# Patient Record
Sex: FEMALE | Race: BLACK OR AFRICAN AMERICAN | NOT HISPANIC OR LATINO
[De-identification: names, ages, dates, MRNs, and addresses within clinical notes are randomized per-mention and may not be internally consistent; named-entity substitution may affect disease eponyms.]

---

## 2017-09-05 ENCOUNTER — APPOINTMENT (OUTPATIENT)
Dept: HEART AND VASCULAR | Facility: CLINIC | Age: 82
End: 2017-09-05
Payer: MEDICARE

## 2017-09-05 VITALS
SYSTOLIC BLOOD PRESSURE: 124 MMHG | TEMPERATURE: 97.8 F | DIASTOLIC BLOOD PRESSURE: 70 MMHG | HEIGHT: 62 IN | WEIGHT: 137 LBS | HEART RATE: 77 BPM | RESPIRATION RATE: 14 BRPM | BODY MASS INDEX: 25.21 KG/M2 | OXYGEN SATURATION: 96 %

## 2017-09-05 DIAGNOSIS — Z86.79 PERSONAL HISTORY OF OTHER DISEASES OF THE CIRCULATORY SYSTEM: ICD-10-CM

## 2017-09-05 DIAGNOSIS — R00.1 BRADYCARDIA, UNSPECIFIED: ICD-10-CM

## 2017-09-05 DIAGNOSIS — I34.0 NONRHEUMATIC MITRAL (VALVE) INSUFFICIENCY: ICD-10-CM

## 2017-09-05 DIAGNOSIS — I35.1 NONRHEUMATIC AORTIC (VALVE) INSUFFICIENCY: ICD-10-CM

## 2017-09-05 PROCEDURE — 36415 COLL VENOUS BLD VENIPUNCTURE: CPT

## 2017-09-05 PROCEDURE — 93306 TTE W/DOPPLER COMPLETE: CPT

## 2017-09-05 PROCEDURE — 99215 OFFICE O/P EST HI 40 MIN: CPT | Mod: 25

## 2017-09-06 PROBLEM — R00.1 BRADYCARDIA, SINUS: Status: ACTIVE | Noted: 2017-09-06

## 2017-09-06 PROBLEM — Z86.79 HISTORY OF PAROXYSMAL ATRIAL TACHYCARDIA: Status: RESOLVED | Noted: 2017-09-06 | Resolved: 2017-09-06

## 2017-09-07 LAB
ALBUMIN SERPL ELPH-MCNC: 3.5 G/DL
ALP BLD-CCNC: 49 U/L
ALT SERPL-CCNC: 13 U/L
ANION GAP SERPL CALC-SCNC: 14 MMOL/L
AST SERPL-CCNC: 23 U/L
BASOPHILS # BLD AUTO: 0.02 K/UL
BASOPHILS NFR BLD AUTO: 0.4 %
BILIRUB SERPL-MCNC: 0.2 MG/DL
BUN SERPL-MCNC: 17 MG/DL
CALCIUM SERPL-MCNC: 8.6 MG/DL
CHLORIDE SERPL-SCNC: 103 MMOL/L
CO2 SERPL-SCNC: 26 MMOL/L
CREAT SERPL-MCNC: 0.86 MG/DL
EOSINOPHIL # BLD AUTO: 0.13 K/UL
EOSINOPHIL NFR BLD AUTO: 2.4 %
GLUCOSE SERPL-MCNC: 98 MG/DL
HCT VFR BLD CALC: 34.1 %
HGB BLD-MCNC: 10.5 G/DL
IMM GRANULOCYTES NFR BLD AUTO: 0 %
LYMPHOCYTES # BLD AUTO: 1.56 K/UL
LYMPHOCYTES NFR BLD AUTO: 28.5 %
MAGNESIUM SERPL-MCNC: 2 MG/DL
MAN DIFF?: NORMAL
MCHC RBC-ENTMCNC: 29.2 PG
MCHC RBC-ENTMCNC: 30.8 GM/DL
MCV RBC AUTO: 94.7 FL
MONOCYTES # BLD AUTO: 0.44 K/UL
MONOCYTES NFR BLD AUTO: 8 %
NEUTROPHILS # BLD AUTO: 3.33 K/UL
NEUTROPHILS NFR BLD AUTO: 60.7 %
PLATELET # BLD AUTO: 220 K/UL
POTASSIUM SERPL-SCNC: 4.1 MMOL/L
PROT SERPL-MCNC: 7.2 G/DL
RBC # BLD: 3.6 M/UL
RBC # FLD: 16.7 %
SODIUM SERPL-SCNC: 143 MMOL/L
TSH SERPL-ACNC: 1 UIU/ML
WBC # FLD AUTO: 5.48 K/UL

## 2018-01-25 ENCOUNTER — APPOINTMENT (OUTPATIENT)
Dept: HEART AND VASCULAR | Facility: CLINIC | Age: 83
End: 2018-01-25
Payer: MEDICARE

## 2018-01-25 VITALS
OXYGEN SATURATION: 97 % | TEMPERATURE: 96.6 F | HEART RATE: 78 BPM | BODY MASS INDEX: 27.79 KG/M2 | SYSTOLIC BLOOD PRESSURE: 132 MMHG | RESPIRATION RATE: 12 BRPM | WEIGHT: 151 LBS | DIASTOLIC BLOOD PRESSURE: 88 MMHG | HEIGHT: 62 IN

## 2018-01-25 DIAGNOSIS — D64.9 ANEMIA, UNSPECIFIED: ICD-10-CM

## 2018-01-25 DIAGNOSIS — R94.31 ABNORMAL ELECTROCARDIOGRAM [ECG] [EKG]: ICD-10-CM

## 2018-01-25 PROCEDURE — 93000 ELECTROCARDIOGRAM COMPLETE: CPT

## 2018-01-25 PROCEDURE — 99214 OFFICE O/P EST MOD 30 MIN: CPT | Mod: 25

## 2018-01-28 LAB
ALBUMIN SERPL ELPH-MCNC: 3.9 G/DL
ALP BLD-CCNC: 48 U/L
ALT SERPL-CCNC: 23 U/L
ANION GAP SERPL CALC-SCNC: 16 MMOL/L
AST SERPL-CCNC: 28 U/L
BASOPHILS # BLD AUTO: 0.01 K/UL
BASOPHILS NFR BLD AUTO: 0.1 %
BILIRUB SERPL-MCNC: 0.3 MG/DL
BUN SERPL-MCNC: 17 MG/DL
CALCIUM SERPL-MCNC: 9 MG/DL
CHLORIDE SERPL-SCNC: 103 MMOL/L
CHOLEST SERPL-MCNC: 242 MG/DL
CHOLEST/HDLC SERPL: 2.5 RATIO
CO2 SERPL-SCNC: 26 MMOL/L
CREAT SERPL-MCNC: 0.94 MG/DL
EOSINOPHIL # BLD AUTO: 0.07 K/UL
EOSINOPHIL NFR BLD AUTO: 0.8 %
GLUCOSE SERPL-MCNC: 118 MG/DL
HCT VFR BLD CALC: 37.2 %
HDLC SERPL-MCNC: 97 MG/DL
HGB BLD-MCNC: 11.7 G/DL
IMM GRANULOCYTES NFR BLD AUTO: 0.4 %
LDLC SERPL CALC-MCNC: 123 MG/DL
LYMPHOCYTES # BLD AUTO: 0.86 K/UL
LYMPHOCYTES NFR BLD AUTO: 10.4 %
MAGNESIUM SERPL-MCNC: 2 MG/DL
MAN DIFF?: NORMAL
MCHC RBC-ENTMCNC: 30.3 PG
MCHC RBC-ENTMCNC: 31.5 GM/DL
MCV RBC AUTO: 96.4 FL
MONOCYTES # BLD AUTO: 0.27 K/UL
MONOCYTES NFR BLD AUTO: 3.3 %
NEUTROPHILS # BLD AUTO: 7.06 K/UL
NEUTROPHILS NFR BLD AUTO: 85 %
NT-PROBNP SERPL-MCNC: 335 PG/ML
PLATELET # BLD AUTO: 203 K/UL
POTASSIUM SERPL-SCNC: 4.3 MMOL/L
PROT SERPL-MCNC: 7 G/DL
RBC # BLD: 3.86 M/UL
RBC # FLD: 17.2 %
SODIUM SERPL-SCNC: 145 MMOL/L
TRIGL SERPL-MCNC: 109 MG/DL
WBC # FLD AUTO: 8.3 K/UL

## 2018-02-19 ENCOUNTER — EMERGENCY (EMERGENCY)
Facility: HOSPITAL | Age: 83
LOS: 1 days | Discharge: HOME | End: 2018-02-19
Admitting: EMERGENCY MEDICINE
Payer: MEDICARE

## 2018-02-19 VITALS
HEART RATE: 82 BPM | OXYGEN SATURATION: 97 % | RESPIRATION RATE: 16 BRPM | SYSTOLIC BLOOD PRESSURE: 173 MMHG | TEMPERATURE: 98 F | DIASTOLIC BLOOD PRESSURE: 74 MMHG

## 2018-02-19 VITALS
OXYGEN SATURATION: 97 % | TEMPERATURE: 98 F | HEART RATE: 64 BPM | DIASTOLIC BLOOD PRESSURE: 83 MMHG | SYSTOLIC BLOOD PRESSURE: 180 MMHG | RESPIRATION RATE: 18 BRPM

## 2018-02-19 DIAGNOSIS — Z90.710 ACQUIRED ABSENCE OF BOTH CERVIX AND UTERUS: ICD-10-CM

## 2018-02-19 DIAGNOSIS — I10 ESSENTIAL (PRIMARY) HYPERTENSION: ICD-10-CM

## 2018-02-19 DIAGNOSIS — Z98.890 OTHER SPECIFIED POSTPROCEDURAL STATES: Chronic | ICD-10-CM
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
## 2018-02-19 DIAGNOSIS — S30.0XXA CONTUSION OF LOWER BACK AND PELVIS, INITIAL ENCOUNTER: ICD-10-CM
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
## 2018-02-19 DIAGNOSIS — M54.9 DORSALGIA, UNSPECIFIED: ICD-10-CM
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
## 2018-02-19 DIAGNOSIS — S20.222A CONTUSION OF LEFT BACK WALL OF THORAX, INITIAL ENCOUNTER: ICD-10-CM

## 2018-02-19 DIAGNOSIS — S20.221A CONTUSION OF RIGHT BACK WALL OF THORAX, INITIAL ENCOUNTER: ICD-10-CM

## 2018-02-19 DIAGNOSIS — Z90.722 ACQUIRED ABSENCE OF OVARIES, BILATERAL: ICD-10-CM

## 2018-02-19 DIAGNOSIS — J44.9 CHRONIC OBSTRUCTIVE PULMONARY DISEASE, UNSPECIFIED: ICD-10-CM

## 2018-02-19 LAB
ALBUMIN SERPL ELPH-MCNC: 3.2 G/DL — LOW (ref 3.4–5)
ALP SERPL-CCNC: 45 U/L — SIGNIFICANT CHANGE UP (ref 40–120)
ALT FLD-CCNC: 31 U/L — SIGNIFICANT CHANGE UP (ref 12–42)
ANION GAP SERPL CALC-SCNC: 7 MMOL/L — LOW (ref 9–16)
APPEARANCE UR: CLEAR — SIGNIFICANT CHANGE UP
AST SERPL-CCNC: 24 U/L — SIGNIFICANT CHANGE UP (ref 15–37)
BILIRUB SERPL-MCNC: 0.3 MG/DL — SIGNIFICANT CHANGE UP (ref 0.2–1.2)
BILIRUB UR-MCNC: NEGATIVE — SIGNIFICANT CHANGE UP
BUN SERPL-MCNC: 19 MG/DL — SIGNIFICANT CHANGE UP (ref 7–23)
CALCIUM SERPL-MCNC: 8.3 MG/DL — LOW (ref 8.5–10.5)
CHLORIDE SERPL-SCNC: 107 MMOL/L — SIGNIFICANT CHANGE UP (ref 96–108)
CO2 SERPL-SCNC: 28 MMOL/L — SIGNIFICANT CHANGE UP (ref 22–31)
COLOR SPEC: YELLOW — SIGNIFICANT CHANGE UP
CREAT SERPL-MCNC: 0.97 MG/DL — SIGNIFICANT CHANGE UP (ref 0.5–1.3)
DIFF PNL FLD: (no result)
GLUCOSE SERPL-MCNC: 124 MG/DL — HIGH (ref 70–99)
GLUCOSE UR QL: NEGATIVE — SIGNIFICANT CHANGE UP
HCT VFR BLD CALC: 34.6 % — SIGNIFICANT CHANGE UP (ref 34.5–45)
HGB BLD-MCNC: 11.3 G/DL — LOW (ref 11.5–15.5)
INR BLD: 1.07 — SIGNIFICANT CHANGE UP (ref 0.88–1.16)
KETONES UR-MCNC: NEGATIVE — SIGNIFICANT CHANGE UP
LEUKOCYTE ESTERASE UR-ACNC: NEGATIVE — SIGNIFICANT CHANGE UP
MCHC RBC-ENTMCNC: 30.7 PG — SIGNIFICANT CHANGE UP (ref 27–34)
MCHC RBC-ENTMCNC: 32.7 G/DL — SIGNIFICANT CHANGE UP (ref 32–36)
MCV RBC AUTO: 94 FL — SIGNIFICANT CHANGE UP (ref 80–100)
NITRITE UR-MCNC: NEGATIVE — SIGNIFICANT CHANGE UP
PH UR: 6 — SIGNIFICANT CHANGE UP (ref 5–8)
PLATELET # BLD AUTO: 201 K/UL — SIGNIFICANT CHANGE UP (ref 150–400)
POTASSIUM SERPL-MCNC: 3.8 MMOL/L — SIGNIFICANT CHANGE UP (ref 3.5–5.3)
POTASSIUM SERPL-SCNC: 3.8 MMOL/L — SIGNIFICANT CHANGE UP (ref 3.5–5.3)
PROT SERPL-MCNC: 6.7 G/DL — SIGNIFICANT CHANGE UP (ref 6.4–8.2)
PROT UR-MCNC: NEGATIVE MG/DL — SIGNIFICANT CHANGE UP
PROTHROM AB SERPL-ACNC: 11.8 SEC — SIGNIFICANT CHANGE UP (ref 9.8–12.7)
RBC # BLD: 3.68 M/UL — LOW (ref 3.8–5.2)
RBC # FLD: 14.8 % — SIGNIFICANT CHANGE UP (ref 10.3–16.9)
SODIUM SERPL-SCNC: 142 MMOL/L — SIGNIFICANT CHANGE UP (ref 132–145)
SP GR SPEC: <=1.005 — SIGNIFICANT CHANGE UP (ref 1–1.03)
UROBILINOGEN FLD QL: 0.2 E.U./DL — SIGNIFICANT CHANGE UP
WBC # BLD: 8.5 K/UL — SIGNIFICANT CHANGE UP (ref 3.8–10.5)
WBC # FLD AUTO: 8.5 K/UL — SIGNIFICANT CHANGE UP (ref 3.8–10.5)

## 2018-02-19 PROCEDURE — 74177 CT ABD & PELVIS W/CONTRAST: CPT | Mod: 26

## 2018-02-19 PROCEDURE — 71260 CT THORAX DX C+: CPT | Mod: 26

## 2018-02-19 PROCEDURE — 99284 EMERGENCY DEPT VISIT MOD MDM: CPT

## 2018-02-19 RX ORDER — ACETAMINOPHEN 500 MG
2 TABLET ORAL
Qty: 30 | Refills: 0
Start: 2018-02-19 | End: 2018-02-23

## 2018-02-19 RX ORDER — IBUPROFEN 200 MG
1 TABLET ORAL
Qty: 28 | Refills: 0
Start: 2018-02-19 | End: 2018-02-25

## 2018-02-19 RX ORDER — ACETAMINOPHEN 500 MG
650 TABLET ORAL ONCE
Qty: 0 | Refills: 0 | Status: COMPLETED | OUTPATIENT
Start: 2018-02-19 | End: 2018-02-19

## 2018-02-19 RX ORDER — KETOROLAC TROMETHAMINE 30 MG/ML
30 SYRINGE (ML) INJECTION ONCE
Qty: 0 | Refills: 0 | Status: DISCONTINUED | OUTPATIENT
Start: 2018-02-19 | End: 2018-02-19

## 2018-02-19 RX ADMIN — Medication 650 MILLIGRAM(S): at 16:53

## 2018-02-19 RX ADMIN — Medication 30 MILLIGRAM(S): at 16:53

## 2018-02-19 NOTE — ED PROVIDER NOTE - CONSTITUTIONAL, MLM
normal... Well appearing, well nourished, awake, alert, oriented to person, place, time/situation and in no apparent distress. Patient laying on R side in fetal position.

## 2018-02-19 NOTE — ED PROVIDER NOTE - MEDICAL DECISION MAKING DETAILS
89 year old female presents to the ED s/p mechanical fall, no LOC, complaining of back pain. Labs unremarkable. UA (+) w/ trace blood, no leukocytosis to suggest infection. CT chest, abdomen, pelvis to rule out retroperitoneal injury vs spinal injury, no acute findings, incidental findings of diverticulosis, nephrolithiasis, and aortic valve calcification disc. Will give Toradol and Tylenol for pain, ambulatory trial, and discharge home w/ grandson.

## 2018-02-19 NOTE — ED ADULT NURSE NOTE - OBJECTIVE STATEMENT
Pt states she usually walks with a walker, but today did not have it and fell onto her back. Back does not appear swollen, no redness, no abnormalities. Arrives with grandson.

## 2018-02-19 NOTE — ED PROVIDER NOTE - PMH
Arthritis    COPD (chronic obstructive pulmonary disease)    HTN (hypertension)    Polymyalgia rheumatica

## 2018-02-19 NOTE — ED PROVIDER NOTE - MUSCULOSKELETAL, MLM
Cervical spine is non-tender. Both spinal and paraspinal tenderness to thoracic and lumbar spine. Pelvis stable. Moves all 4 extremities.

## 2018-02-19 NOTE — ED PROVIDER NOTE - OBJECTIVE STATEMENT
89 year old female with PMH HTN, COPD, arthritis, and polymyalgia rheumatica who presents to the ED s/p trip and fall at home. She states she was walking around her apartment without her cane, which she usually requires, and trip falling back. No head injury or LOC. She complains of diffuse back pain. She has had difficulty walking since the fall. She was on the floor for 5 minutes before she called her grandson who assisted her up and called EMS for transport to the ED. She is not on anticoagulants or ASA. She denies fever, chills, neck pain, chest pain, abdominal pain, pain to extremities, pain to pelvis, numbness/tingling, nausea, vomiting, visual changes.

## 2018-06-02 ENCOUNTER — EMERGENCY (EMERGENCY)
Facility: HOSPITAL | Age: 83
LOS: 1 days | Discharge: HOME | End: 2018-06-02
Attending: EMERGENCY MEDICINE | Admitting: EMERGENCY MEDICINE
Payer: MEDICARE

## 2018-06-02 VITALS
SYSTOLIC BLOOD PRESSURE: 143 MMHG | HEART RATE: 83 BPM | DIASTOLIC BLOOD PRESSURE: 85 MMHG | TEMPERATURE: 98 F | RESPIRATION RATE: 18 BRPM | OXYGEN SATURATION: 100 %

## 2018-06-02 DIAGNOSIS — Z98.890 OTHER SPECIFIED POSTPROCEDURAL STATES: Chronic | ICD-10-CM

## 2018-06-02 PROCEDURE — 99283 EMERGENCY DEPT VISIT LOW MDM: CPT

## 2018-06-02 RX ORDER — FAMOTIDINE 10 MG/ML
1 INJECTION INTRAVENOUS
Qty: 8 | Refills: 0
Start: 2018-06-02 | End: 2018-06-05

## 2018-06-02 RX ORDER — DIPHENHYDRAMINE HCL 50 MG
25 CAPSULE ORAL ONCE
Qty: 0 | Refills: 0 | Status: COMPLETED | OUTPATIENT
Start: 2018-06-02 | End: 2018-06-02

## 2018-06-02 RX ORDER — FAMOTIDINE 10 MG/ML
20 INJECTION INTRAVENOUS ONCE
Qty: 0 | Refills: 0 | Status: COMPLETED | OUTPATIENT
Start: 2018-06-02 | End: 2018-06-02

## 2018-06-02 RX ORDER — DIPHENHYDRAMINE HCL 50 MG
1 CAPSULE ORAL
Qty: 10 | Refills: 0
Start: 2018-06-02

## 2018-06-02 RX ADMIN — Medication 40 MILLIGRAM(S): at 21:30

## 2018-06-02 RX ADMIN — FAMOTIDINE 20 MILLIGRAM(S): 10 INJECTION INTRAVENOUS at 21:30

## 2018-06-02 RX ADMIN — Medication 25 MILLIGRAM(S): at 21:30

## 2018-06-02 NOTE — ED ADULT TRIAGE NOTE - SPO2 (%)
Yes, I believe this prescription needs to be sent to Myron Gupta at [facility] neurology for refill.    Please advise patient or pharmacy to send refill request to the appropriate office.    Lazaro Gupta MD    100

## 2018-06-02 NOTE — ED PROVIDER NOTE - MEDICAL DECISION MAKING DETAILS
Angioedema.  Likely due to diovan.  Instructed to stop diovan.  Will give steroids and H1/2 blockers for possible allergic reaction.  Instructed to f/u with her PCP on Monday for new anti-hypertensive and re-evaluation.  instructed to return here immediately for any other sxs or worsening swelling.

## 2018-06-02 NOTE — ED PROVIDER NOTE - PHYSICAL EXAMINATION
VITAL SIGNS: I have reviewed nursing notes and confirm.  CONSTITUTIONAL: Well-developed; well-nourished; in no acute distress.  SKIN: Skin is warm and dry, no acute rash.  HEAD: Normocephalic; atraumatic.  EYES: PERRL, EOM intact; conjunctiva and sclera clear.  ENT: No nasal discharge; airway clear; no tongue or uvular edema.  +Swelling to upper lip.  Normal voice.   NECK: Supple; non tender.  CARD: S1, S2 normal; no murmurs, gallops, or rubs. Regular rate and rhythm.  RESP: No wheezes, rales or rhonchi.  ABD: Normal bowel sounds; soft; non-distended; non-tender; no hepatosplenomegaly.  EXT: Normal ROM. No clubbing, cyanosis or edema.  NEURO: Alert, oriented. Grossly unremarkable.  PSYCH: Cooperative, appropriate.

## 2018-06-02 NOTE — ED PROVIDER NOTE - OBJECTIVE STATEMENT
Pt is a 89yo Pt is a 89yo F with a h/o HTN, PMR, COPD, and previous angioedema from lisinopril who p/w 1 day of upper lip swelling.  swelling came on slowly and no associated throat or tongue swelling.  Denies any wheezing, drooling, change in voice, rash, itching, abd pain, N/V/D, or other concerns.  Reports angioedema about 5 years ago and was exactly the same.  Was switched from lisinopril to diovan at that time.

## 2018-06-02 NOTE — ED ADULT TRIAGE NOTE - CHIEF COMPLAINT QUOTE
pt c/o lip swelling since this afternoon, no airway compromise, happened once before with lisinopril, no ace inhibitors for last few years

## 2018-06-06 DIAGNOSIS — T78.3XXA ANGIONEUROTIC EDEMA, INITIAL ENCOUNTER: ICD-10-CM

## 2018-06-06 DIAGNOSIS — I10 ESSENTIAL (PRIMARY) HYPERTENSION: ICD-10-CM

## 2018-06-06 DIAGNOSIS — R22.0 LOCALIZED SWELLING, MASS AND LUMP, HEAD: ICD-10-CM

## 2018-06-06 DIAGNOSIS — Z88.2 ALLERGY STATUS TO SULFONAMIDES: ICD-10-CM

## 2018-06-06 DIAGNOSIS — J44.9 CHRONIC OBSTRUCTIVE PULMONARY DISEASE, UNSPECIFIED: ICD-10-CM

## 2018-06-06 DIAGNOSIS — Z79.1 LONG TERM (CURRENT) USE OF NON-STEROIDAL ANTI-INFLAMMATORIES (NSAID): ICD-10-CM

## 2018-07-19 ENCOUNTER — APPOINTMENT (OUTPATIENT)
Dept: HEART AND VASCULAR | Facility: CLINIC | Age: 83
End: 2018-07-19
Payer: MEDICARE

## 2018-07-19 VITALS
BODY MASS INDEX: 26.31 KG/M2 | DIASTOLIC BLOOD PRESSURE: 70 MMHG | RESPIRATION RATE: 12 BRPM | WEIGHT: 143 LBS | HEART RATE: 81 BPM | OXYGEN SATURATION: 97 % | TEMPERATURE: 97.7 F | SYSTOLIC BLOOD PRESSURE: 110 MMHG | HEIGHT: 62 IN

## 2018-07-19 PROBLEM — J44.9 CHRONIC OBSTRUCTIVE PULMONARY DISEASE, UNSPECIFIED: Chronic | Status: ACTIVE | Noted: 2018-02-19

## 2018-07-19 PROBLEM — M19.90 UNSPECIFIED OSTEOARTHRITIS, UNSPECIFIED SITE: Chronic | Status: ACTIVE | Noted: 2018-02-19

## 2018-07-19 PROBLEM — M35.3 POLYMYALGIA RHEUMATICA: Chronic | Status: ACTIVE | Noted: 2018-02-19

## 2018-07-19 PROBLEM — I10 ESSENTIAL (PRIMARY) HYPERTENSION: Chronic | Status: ACTIVE | Noted: 2018-02-19

## 2018-07-19 PROCEDURE — 99214 OFFICE O/P EST MOD 30 MIN: CPT

## 2018-09-10 ENCOUNTER — APPOINTMENT (OUTPATIENT)
Dept: HEART AND VASCULAR | Facility: CLINIC | Age: 83
End: 2018-09-10
Payer: MEDICARE

## 2018-09-10 VITALS
HEIGHT: 62 IN | BODY MASS INDEX: 25.76 KG/M2 | RESPIRATION RATE: 12 BRPM | DIASTOLIC BLOOD PRESSURE: 78 MMHG | TEMPERATURE: 97.5 F | WEIGHT: 140 LBS | HEART RATE: 80 BPM | SYSTOLIC BLOOD PRESSURE: 140 MMHG | OXYGEN SATURATION: 97 %

## 2018-09-10 PROCEDURE — 93000 ELECTROCARDIOGRAM COMPLETE: CPT

## 2018-09-10 PROCEDURE — 93306 TTE W/DOPPLER COMPLETE: CPT

## 2019-01-15 NOTE — ED ADULT NURSE NOTE - PT NEEDS ASSIST
8w3d  Caller: patient  pregnancy   Details of condition: Patient experiencing spotting.  Pharmacy verified? No  Appointment offered? No  Patient would like a call back at [phone]   yes

## 2019-01-23 ENCOUNTER — RX RENEWAL (OUTPATIENT)
Age: 84
End: 2019-01-23

## 2019-03-14 ENCOUNTER — APPOINTMENT (OUTPATIENT)
Dept: HEART AND VASCULAR | Facility: CLINIC | Age: 84
End: 2019-03-14
Payer: MEDICARE

## 2019-03-14 VITALS
HEIGHT: 62 IN | BODY MASS INDEX: 24.29 KG/M2 | TEMPERATURE: 97.2 F | SYSTOLIC BLOOD PRESSURE: 130 MMHG | RESPIRATION RATE: 12 BRPM | WEIGHT: 132 LBS | DIASTOLIC BLOOD PRESSURE: 80 MMHG | OXYGEN SATURATION: 96 % | HEART RATE: 84 BPM

## 2019-03-14 DIAGNOSIS — R53.83 OTHER FATIGUE: ICD-10-CM

## 2019-03-14 DIAGNOSIS — I11.9 HYPERTENSIVE HEART DISEASE W/OUT HEART FAILURE: ICD-10-CM

## 2019-03-14 PROCEDURE — 99214 OFFICE O/P EST MOD 30 MIN: CPT

## 2019-03-14 PROCEDURE — 93000 ELECTROCARDIOGRAM COMPLETE: CPT

## 2019-03-14 NOTE — DISCUSSION/SUMMARY
[A-V Block] : A-V block [Mild Aortic Stenosis] : mild aortic stenosis [Stable] : stable [Responding to Treatment] : responding to treatment [Outpatient Evaluation] : outpatient evaluation [Echocardiogram] : echocardiogram [None] : none [___ Month(s)] : [unfilled] month(s) [With Me] : with me [FreeTextEntry1] : no change in therapy warranted at this time\par \par follow up echocardiogram  9/19 to assess aortic stenosis \par \par medications reconciled and renewed \par \par answered all questions

## 2019-03-14 NOTE — REASON FOR VISIT
[Follow-Up - Clinic] : a clinic follow-up of [Dyspnea] : dyspnea [Fatigue] : feeling tired (fatigue) [Hypertension] : hypertension [Medication Management] : Medication management [FreeTextEntry1] : 89  year old -American female presents for follow up. Has remote hx of syncope  that occurred on August 3rd 2017  while on a [employer] .  EKG  showed sinus bradycardia  with 1st degree AVB with nonspecific T wave abnormalities.  She was administered iv hydration and BP was 140/80   with HR 68 bpm ,  o 2 sats 100%   on low flow 0xygen.  Since that episode , there were no recurrences. She denies chest pains, palpitations, orthopnea, bleeding from any orifices , fevers, chills , diarrhea, nausea or vomiting. \par \par She has mild aortic stenosis  with normal LV function

## 2019-03-14 NOTE — REVIEW OF SYSTEMS
[Feeling Fatigued] : feeling fatigued [Dyspnea on exertion] : dyspnea during exertion [Lower Ext Edema] : lower extremity edema [Joint Pain] : joint pain [Joint Swelling] : joint swelling [Negative] : Heme/Lymph [Recent Weight Gain (___ Lbs)] : no recent weight gain [Recent Weight Loss (___ Lbs)] : no recent weight loss [Palpitations] : no palpitations

## 2019-03-14 NOTE — HISTORY OF PRESENT ILLNESS
[FreeTextEntry1] : Main complaint is fatigue and increasing dyspnea, \par \par PMD decreased prednisone from 5mg daily to 5mg alternating with 2.5mg \par \par No chest pain \par \par EKG stable sinus rhythm  1 degree AVB with LVH and nonspecific T wave flattening \par \par Appetite fair , no fevers, cough , no change in thyroid  medication dosing \par \par

## 2019-03-14 NOTE — PHYSICAL EXAM
[Well Groomed] : well groomed [General Appearance - Well Nourished] : well nourished [General Appearance - In No Acute Distress] : no acute distress [Normal Conjunctiva] : the conjunctiva exhibited no abnormalities [Eyelids - No Xanthelasma] : the eyelids demonstrated no xanthelasmas [No Oral Cyanosis] : no oral cyanosis [Normal Jugular Venous A Waves Present] : normal jugular venous A waves present [Normal Jugular Venous V Waves Present] : normal jugular venous V waves present [No Jugular Venous Jo A Waves] : no jugular venous jo A waves [Respiration, Rhythm And Depth] : normal respiratory rhythm and effort [Exaggerated Use Of Accessory Muscles For Inspiration] : no accessory muscle use [Auscultation Breath Sounds / Voice Sounds] : lungs were clear to auscultation bilaterally [Heart Rate And Rhythm] : heart rate and rhythm were normal [Heart Sounds] : normal S1 and S2 [Systolic grade ___/6] : A grade [unfilled]/6 systolic murmur was heard. [Nail Clubbing] : no clubbing of the fingernails [Cyanosis, Localized] : no localized cyanosis [Petechial Hemorrhages (___cm)] : no petechial hemorrhages [Nail Splinter Hemorrhages] : no splinter hemorrhages of the nails [Fingers Osler's Nodes] : Osler's nodes were not seenon the fingers [Skin Color & Pigmentation] : normal skin color and pigmentation [Skin Turgor] : normal skin turgor [] : no rash [No Venous Stasis] : no venous stasis [Skin Lesions] : no skin lesions [No Skin Ulcers] : no skin ulcer [No Xanthoma] : no  xanthoma was observed [Oriented To Time, Place, And Person] : oriented to person, place, and time [Impaired Insight] : insight and judgment were intact [Affect] : the affect was normal [Mood] : the mood was normal [Memory Recent] : recent memory was not impaired [Memory Remote] : remote memory was not impaired [No Anxiety] : not feeling anxious [FreeTextEntry1] : walks with walker

## 2019-03-14 NOTE — ASSESSMENT
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

## 2019-09-12 ENCOUNTER — APPOINTMENT (OUTPATIENT)
Dept: HEART AND VASCULAR | Facility: CLINIC | Age: 84
End: 2019-09-12
Payer: MEDICARE

## 2019-09-12 VITALS
TEMPERATURE: 98.8 F | OXYGEN SATURATION: 98 % | HEIGHT: 59 IN | DIASTOLIC BLOOD PRESSURE: 78 MMHG | SYSTOLIC BLOOD PRESSURE: 130 MMHG | RESPIRATION RATE: 12 BRPM | WEIGHT: 134 LBS | HEART RATE: 81 BPM | BODY MASS INDEX: 27.01 KG/M2

## 2019-09-12 DIAGNOSIS — R55 SYNCOPE AND COLLAPSE: ICD-10-CM

## 2019-09-12 PROCEDURE — 93224 XTRNL ECG REC UP TO 48 HRS: CPT

## 2019-09-12 PROCEDURE — 36415 COLL VENOUS BLD VENIPUNCTURE: CPT

## 2019-09-12 PROCEDURE — 99214 OFFICE O/P EST MOD 30 MIN: CPT

## 2019-09-12 PROCEDURE — 93000 ELECTROCARDIOGRAM COMPLETE: CPT | Mod: 58

## 2019-09-15 LAB
ALBUMIN SERPL ELPH-MCNC: 3.9 G/DL
ALP BLD-CCNC: 65 U/L
ALT SERPL-CCNC: 16 U/L
ANION GAP SERPL CALC-SCNC: 13 MMOL/L
AST SERPL-CCNC: 21 U/L
BASOPHILS # BLD AUTO: 0.02 K/UL
BASOPHILS NFR BLD AUTO: 0.4 %
BILIRUB SERPL-MCNC: 0.3 MG/DL
BUN SERPL-MCNC: 17 MG/DL
CALCIUM SERPL-MCNC: 8.9 MG/DL
CHLORIDE SERPL-SCNC: 103 MMOL/L
CHOLEST SERPL-MCNC: 152 MG/DL
CHOLEST/HDLC SERPL: 2.5 RATIO
CO2 SERPL-SCNC: 26 MMOL/L
CREAT SERPL-MCNC: 0.92 MG/DL
EOSINOPHIL # BLD AUTO: 0.15 K/UL
EOSINOPHIL NFR BLD AUTO: 3 %
ESTIMATED AVERAGE GLUCOSE: 117 MG/DL
GLUCOSE SERPL-MCNC: 106 MG/DL
HBA1C MFR BLD HPLC: 5.7 %
HCT VFR BLD CALC: 35.8 %
HDLC SERPL-MCNC: 61 MG/DL
HGB BLD-MCNC: 10.9 G/DL
IMM GRANULOCYTES NFR BLD AUTO: 0.4 %
LDLC SERPL CALC-MCNC: 76 MG/DL
LYMPHOCYTES # BLD AUTO: 1.13 K/UL
LYMPHOCYTES NFR BLD AUTO: 22.6 %
MAN DIFF?: NORMAL
MCHC RBC-ENTMCNC: 29.6 PG
MCHC RBC-ENTMCNC: 30.4 GM/DL
MCV RBC AUTO: 97.3 FL
MONOCYTES # BLD AUTO: 0.43 K/UL
MONOCYTES NFR BLD AUTO: 8.6 %
NEUTROPHILS # BLD AUTO: 3.26 K/UL
NEUTROPHILS NFR BLD AUTO: 65 %
NT-PROBNP SERPL-MCNC: 254 PG/ML
PLATELET # BLD AUTO: 222 K/UL
POTASSIUM SERPL-SCNC: 4 MMOL/L
PROT SERPL-MCNC: 6.5 G/DL
RBC # BLD: 3.68 M/UL
RBC # FLD: 15.3 %
SODIUM SERPL-SCNC: 142 MMOL/L
TRIGL SERPL-MCNC: 74 MG/DL
TSH SERPL-ACNC: 1.02 UIU/ML
WBC # FLD AUTO: 5.01 K/UL

## 2019-09-15 NOTE — REASON FOR VISIT
[Follow-Up - Clinic] : a clinic follow-up of [Abnormal ECG] : an abnormal ECG [Aortic Stenosis] : aortic stenosis [Hypertension] : hypertension [Syncope] : syncope [FreeTextEntry1] : 89  year old -American female presents for evaluation after recent syncope. \par \jazmín  Has remote hx of syncope  that occurred on August 3rd 2017  while on a [employer] .  EKG  showed sinus bradycardia  with 1st degree AVB with nonspecific T wave abnormalities.  She was administered iv hydration and BP was 140/80   with HR 68 bpm ,  o 2 sats 100%   on low flow 0xygen.  Since that episode , there were no recurrences. She denies chest pains, palpitations, orthopnea, bleeding from any orifices , fevers, chills , diarrhea, nausea or vomiting. \par \par She has mild aortic stenosis  with normal LV function

## 2019-09-15 NOTE — ASSESSMENT
[FreeTextEntry1] : recurrent syncope, probably vasovagal \par \par mild aortic stenosis \par \par HTN well controlled\par \par 1st degree AVB [1st Degree AV Block (426.11\I44.0)] : ~T physical exam was performed

## 2019-09-15 NOTE — HISTORY OF PRESENT ILLNESS
[FreeTextEntry1] : Most recent episode of syncope was a repeat scenario  that occurred during visit to  . After washing, she was placed under hair drier and she syncopized without head trauma , nausea, vomiting or seizure activity. \par \par EKG now shows  NSR 73 bpm with 1st degree AVB without ectopy, ischemia or LVH \par \par She denies bleeding from any orifices\par \par Has been compliant with all medications \par \par Denies chest pains, palpitations, orthopnea, headaches

## 2019-09-15 NOTE — REVIEW OF SYSTEMS
[Recent Weight Gain (___ Lbs)] : no recent weight gain [Feeling Fatigued] : not feeling fatigued [Recent Weight Loss (___ Lbs)] : no recent weight loss [Dyspnea on exertion] : dyspnea during exertion [Lower Ext Edema] : lower extremity edema [Palpitations] : no palpitations [Joint Pain] : joint pain [Joint Swelling] : joint swelling [Negative] : Heme/Lymph

## 2019-09-15 NOTE — PHYSICAL EXAM
[General Appearance - Well Developed] : well developed [Normal Appearance] : normal appearance [Well Groomed] : well groomed [General Appearance - Well Nourished] : well nourished [No Deformities] : no deformities [General Appearance - In No Acute Distress] : no acute distress [Normal Conjunctiva] : the conjunctiva exhibited no abnormalities [Eyelids - No Xanthelasma] : the eyelids demonstrated no xanthelasmas [No Oral Cyanosis] : no oral cyanosis [Normal Jugular Venous A Waves Present] : normal jugular venous A waves present [Normal Jugular Venous V Waves Present] : normal jugular venous V waves present [No Jugular Venous Jo A Waves] : no jugular venous jo A waves [Respiration, Rhythm And Depth] : normal respiratory rhythm and effort [Exaggerated Use Of Accessory Muscles For Inspiration] : no accessory muscle use [Auscultation Breath Sounds / Voice Sounds] : lungs were clear to auscultation bilaterally [Heart Rate And Rhythm] : heart rate and rhythm were normal [Heart Sounds] : normal S1 and S2 [Systolic grade ___/6] : A grade [unfilled]/6 systolic murmur was heard. [Nail Clubbing] : no clubbing of the fingernails [FreeTextEntry1] : walks with walker [Cyanosis, Localized] : no localized cyanosis [Petechial Hemorrhages (___cm)] : no petechial hemorrhages [Nail Splinter Hemorrhages] : no splinter hemorrhages of the nails [Fingers Osler's Nodes] : Osler's nodes were not seenon the fingers [Skin Turgor] : normal skin turgor [Skin Color & Pigmentation] : normal skin color and pigmentation [] : no rash [No Venous Stasis] : no venous stasis [Skin Lesions] : no skin lesions [No Skin Ulcers] : no skin ulcer [No Xanthoma] : no  xanthoma was observed [Oriented To Time, Place, And Person] : oriented to person, place, and time [Impaired Insight] : insight and judgment were intact [Affect] : the affect was normal [Mood] : the mood was normal [Memory Remote] : remote memory was not impaired [Memory Recent] : recent memory was not impaired [No Anxiety] : not feeling anxious

## 2019-09-15 NOTE — DISCUSSION/SUMMARY
[A-V Block] : A-V block [Mild Aortic Stenosis] : mild aortic stenosis [Essential Hypertension] : essential hypertension [Stable] : stable [Responding to Treatment] : responding to treatment [None] : none [With PCP] : with ~his/her~ primary care provider [FreeTextEntry1] : venipuncture performed \par \par Holter monitor placed \par \par advised patient to avoid having hair dried under a drier in the salon - intense heat may cause significant vasodilation \par \par No evidence of significant stenosis of the carotid or vertebral arterial systems

## 2019-11-18 ENCOUNTER — EMERGENCY (EMERGENCY)
Facility: HOSPITAL | Age: 84
LOS: 1 days | Discharge: HOME | End: 2019-11-18
Attending: EMERGENCY MEDICINE | Admitting: EMERGENCY MEDICINE
Payer: MEDICARE

## 2019-11-18 VITALS
SYSTOLIC BLOOD PRESSURE: 180 MMHG | RESPIRATION RATE: 16 BRPM | OXYGEN SATURATION: 99 % | HEART RATE: 76 BPM | DIASTOLIC BLOOD PRESSURE: 100 MMHG

## 2019-11-18 VITALS
OXYGEN SATURATION: 97 % | DIASTOLIC BLOOD PRESSURE: 99 MMHG | WEIGHT: 154.32 LBS | RESPIRATION RATE: 14 BRPM | TEMPERATURE: 98 F | SYSTOLIC BLOOD PRESSURE: 189 MMHG | HEART RATE: 87 BPM

## 2019-11-18 DIAGNOSIS — Z98.890 OTHER SPECIFIED POSTPROCEDURAL STATES: Chronic | ICD-10-CM

## 2019-11-18 PROCEDURE — 70450 CT HEAD/BRAIN W/O DYE: CPT | Mod: 26

## 2019-11-18 PROCEDURE — 72125 CT NECK SPINE W/O DYE: CPT | Mod: 26

## 2019-11-18 PROCEDURE — 99284 EMERGENCY DEPT VISIT MOD MDM: CPT

## 2019-11-18 RX ORDER — ACETAMINOPHEN 500 MG
975 TABLET ORAL ONCE
Refills: 0 | Status: COMPLETED | OUTPATIENT
Start: 2019-11-18 | End: 2019-11-18

## 2019-11-18 RX ORDER — BACITRACIN ZINC 500 UNIT/G
1 OINTMENT IN PACKET (EA) TOPICAL ONCE
Refills: 0 | Status: COMPLETED | OUTPATIENT
Start: 2019-11-18 | End: 2019-11-18

## 2019-11-18 RX ADMIN — Medication 975 MILLIGRAM(S): at 08:48

## 2019-11-18 RX ADMIN — Medication 1 APPLICATION(S): at 07:17

## 2019-11-18 NOTE — ED ADULT NURSE REASSESSMENT NOTE - NS ED NURSE REASSESS COMMENT FT1
bacitracin applied to bridge of nose, haematoma now noticed on left side of forehead
pt en route to cat scan
Patient awake, alert, oriented x3 semi-fowlers in stretcher watching TV in no acute distress (no SOB,  non-diaphoretic), breathing/conversing w/ease on RA. Stephen @bedside. Has skin tear to top of nose & small dime-sized hematoma to R forehead, not open. Both covered in baci. CT complete - awaiting results. Will continue to monitor.

## 2019-11-18 NOTE — ED ADULT NURSE NOTE - OBJECTIVE STATEMENT
pt biba and son, mechanical fall tonight fell out of bed and sustained lac across bridge of nose, denies loc, denies taking blood thinners not actively bleeding from nose injury pt biba and son, mechanical fall tonight whilst trying to get  out of bed and sustained lac across bridge of nose, denies loc, denies taking blood thinners not actively bleeding from nose injury, walks with cane at home, lives with grandson, states has a fall 'one or twice a year", not regularly, wears bilateral wrist splints for arthritis

## 2019-11-18 NOTE — ED PROVIDER NOTE - PATIENT PORTAL LINK FT
You can access the FollowMyHealth Patient Portal offered by [facility] by registering at the following website: [email or web address]. By joining [employer]’s FollowMyHealth portal, you will also be able to view your health information using other applications (apps) compatible with our system.

## 2019-11-18 NOTE — ED PROVIDER NOTE - CARE PLAN
Principal Discharge DX:	Acute head injury, initial encounter  Secondary Diagnosis:	Facial abrasion, initial encounter

## 2019-11-18 NOTE — ED PROVIDER NOTE - CLINICAL SUMMARY MEDICAL DECISION MAKING FREE TEXT BOX
abrasion to bridge of nose when fell from bed, no LOC, no blood thinners, + headache, CT head and bacitracin ordered

## 2019-11-18 NOTE — ED PROVIDER NOTE - NSFOLLOWUPINSTRUCTIONS_ED_ALL_ED_FT
Apply bacitracin every 12 hours to the wound for a week.  Please follow up with your doctor and return here at any time if you have any concerns.

## 2019-11-22 DIAGNOSIS — S00.31XA ABRASION OF NOSE, INITIAL ENCOUNTER: ICD-10-CM
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
## 2019-11-22 DIAGNOSIS — S09.90XA UNSPECIFIED INJURY OF HEAD, INITIAL ENCOUNTER: ICD-10-CM

## 2020-04-16 ENCOUNTER — APPOINTMENT (OUTPATIENT)
Dept: HEART AND VASCULAR | Facility: CLINIC | Age: 85
End: 2020-04-16
Payer: MEDICARE

## 2020-04-16 DIAGNOSIS — Z87.09 PERSONAL HISTORY OF OTHER DISEASES OF THE RESPIRATORY SYSTEM: ICD-10-CM

## 2020-04-16 PROCEDURE — 99441: CPT

## 2020-04-27 RX ORDER — AMLODIPINE BESYLATE 5 MG/1
5 TABLET ORAL DAILY
Qty: 1 | Refills: 1 | Status: ACTIVE | COMMUNITY
Start: 2018-10-02 | End: 1900-01-01

## 2020-04-27 RX ORDER — EZETIMIBE 10 MG/1
10 TABLET ORAL
Qty: 90 | Refills: 1 | Status: ACTIVE | COMMUNITY
Start: 2017-09-08 | End: 1900-01-01

## 2020-04-27 RX ORDER — ALLOPURINOL 100 MG/1
100 TABLET ORAL DAILY
Qty: 90 | Refills: 1 | Status: ACTIVE | COMMUNITY
Start: 2020-04-27 | End: 1900-01-01

## 2020-08-04 ENCOUNTER — APPOINTMENT (OUTPATIENT)
Dept: HEART AND VASCULAR | Facility: CLINIC | Age: 85
End: 2020-08-04
Payer: MEDICARE

## 2020-08-04 VITALS
TEMPERATURE: 98.6 F | OXYGEN SATURATION: 96 % | RESPIRATION RATE: 12 BRPM | HEIGHT: 59 IN | WEIGHT: 131 LBS | DIASTOLIC BLOOD PRESSURE: 76 MMHG | SYSTOLIC BLOOD PRESSURE: 120 MMHG | BODY MASS INDEX: 26.41 KG/M2 | HEART RATE: 83 BPM

## 2020-08-04 DIAGNOSIS — R94.31 ABNORMAL ELECTROCARDIOGRAM [ECG] [EKG]: ICD-10-CM

## 2020-08-04 PROCEDURE — 99214 OFFICE O/P EST MOD 30 MIN: CPT

## 2020-08-04 PROCEDURE — 36415 COLL VENOUS BLD VENIPUNCTURE: CPT

## 2020-08-04 PROCEDURE — 93306 TTE W/DOPPLER COMPLETE: CPT

## 2020-08-04 PROCEDURE — 93000 ELECTROCARDIOGRAM COMPLETE: CPT

## 2020-08-04 NOTE — ASSESSMENT
[FreeTextEntry1] : Increasing Dyspnea on exertion \par \par 1st degree AVB\par \par Increasing loudness of crescendo aortic murmur  of stenosis\par \par \par diastolic  CHF [1st Degree AV Block (426.11\I44.0)] : ~T physical exam was performed [Diastolic Congestive Heart Failure (428.30\I50.30)] : lateral

## 2020-08-04 NOTE — PHYSICAL EXAM
[Well Groomed] : well groomed [Normal Appearance] : normal appearance [General Appearance - Well Developed] : well developed [General Appearance - Well Nourished] : well nourished [No Deformities] : no deformities [General Appearance - In No Acute Distress] : no acute distress [Normal Conjunctiva] : the conjunctiva exhibited no abnormalities [No Oral Cyanosis] : no oral cyanosis [Eyelids - No Xanthelasma] : the eyelids demonstrated no xanthelasmas [Normal Jugular Venous A Waves Present] : normal jugular venous A waves present [No Jugular Venous Jo A Waves] : no jugular venous jo A waves [Normal Jugular Venous V Waves Present] : normal jugular venous V waves present [Respiration, Rhythm And Depth] : normal respiratory rhythm and effort [Auscultation Breath Sounds / Voice Sounds] : lungs were clear to auscultation bilaterally [Exaggerated Use Of Accessory Muscles For Inspiration] : no accessory muscle use [Heart Sounds] : normal S1 and S2 [Heart Rate And Rhythm] : heart rate and rhythm were normal [Systolic grade ___/6] : A grade [unfilled]/6 systolic murmur was heard. [FreeTextEntry1] : walks with walker [Nail Clubbing] : no clubbing of the fingernails [Cyanosis, Localized] : no localized cyanosis [Petechial Hemorrhages (___cm)] : no petechial hemorrhages [Nail Splinter Hemorrhages] : no splinter hemorrhages of the nails [Fingers Osler's Nodes] : Osler's nodes were not seenon the fingers [Skin Color & Pigmentation] : normal skin color and pigmentation [Skin Turgor] : normal skin turgor [No Venous Stasis] : no venous stasis [] : no rash [Skin Lesions] : no skin lesions [No Skin Ulcers] : no skin ulcer [No Xanthoma] : no  xanthoma was observed [Oriented To Time, Place, And Person] : oriented to person, place, and time [Impaired Insight] : insight and judgment were intact [Affect] : the affect was normal [Memory Recent] : recent memory was not impaired [Memory Remote] : remote memory was not impaired [Mood] : the mood was normal [No Anxiety] : not feeling anxious

## 2020-08-04 NOTE — HISTORY OF PRESENT ILLNESS
[FreeTextEntry1] : Today, she is dizzy  and EKG shows NSR 1st degree AVB HR 80 bpm with LVH by voltage  without pathologic Q waves. \par \par \par No fevers, cough, chills or known Covid exposure \par \par

## 2020-08-04 NOTE — REASON FOR VISIT
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  EKG  showed sinus bradycardia  with 1st degree AVB with nonspecific T wave abnormalities.  She was administered iv hydration and BP was 140/80   with HR 68 bpm ,  o 2 sats 100%   on low flow 0xygen.  Since that episode , there were no recurrences. She denies chest pains, palpitations, orthopnea, bleeding from any orifices , fevers, chills , diarrhea, nausea or vomiting. \par \par She has mild aortic stenosis identified by echocardiogram back in 9/2017 with normal LV function

## 2020-08-04 NOTE — DISCUSSION/SUMMARY
[Moderate Aortic Stenosis] : moderate aortic stenosis [Deteriorating] : deteriorating [Outpatient Evaluation] : outpatient evaluation [BNP] : B-type natriuretic peptide [Echocardiogram] : echocardiogram [FreeTextEntry1] : venipuncture with  BNP level , Covid 19 antibody test, etc \par \par \par echocardiogram to assess status of aortic stenosis severity  and its potential role in current symptoms

## 2020-08-04 NOTE — REVIEW OF SYSTEMS
[Feeling Fatigued] : not feeling fatigued [Recent Weight Gain (___ Lbs)] : no recent weight gain [Recent Weight Loss (___ Lbs)] : no recent weight loss [Dyspnea on exertion] : dyspnea during exertion [Lower Ext Edema] : lower extremity edema [Joint Pain] : joint pain [Palpitations] : no palpitations [Joint Swelling] : joint swelling [Dizziness] : dizziness [Negative] : Heme/Lymph

## 2020-08-05 LAB
ALBUMIN SERPL ELPH-MCNC: 4.2 G/DL
ALP BLD-CCNC: 66 U/L
ALT SERPL-CCNC: 23 U/L
ANION GAP SERPL CALC-SCNC: 13 MMOL/L
APPEARANCE: CLEAR
AST SERPL-CCNC: 28 U/L
BASOPHILS # BLD AUTO: 0.03 K/UL
BASOPHILS NFR BLD AUTO: 0.5 %
BILIRUB SERPL-MCNC: 0.4 MG/DL
BILIRUBIN URINE: NEGATIVE
BLOOD URINE: NEGATIVE
BUN SERPL-MCNC: 14 MG/DL
CALCIUM SERPL-MCNC: 8.6 MG/DL
CHLORIDE SERPL-SCNC: 102 MMOL/L
CHOLEST SERPL-MCNC: 179 MG/DL
CHOLEST/HDLC SERPL: 2.4 RATIO
CO2 SERPL-SCNC: 26 MMOL/L
COLOR: COLORLESS
CREAT SERPL-MCNC: 0.95 MG/DL
CREAT SPEC-SCNC: 19 MG/DL
EOSINOPHIL # BLD AUTO: 0.05 K/UL
EOSINOPHIL NFR BLD AUTO: 0.9 %
GLUCOSE QUALITATIVE U: NEGATIVE
GLUCOSE SERPL-MCNC: 98 MG/DL
HCT VFR BLD CALC: 37.8 %
HDLC SERPL-MCNC: 76 MG/DL
HGB BLD-MCNC: 11.2 G/DL
IMM GRANULOCYTES NFR BLD AUTO: 0.2 %
KETONES URINE: NEGATIVE
LDLC SERPL CALC-MCNC: 90 MG/DL
LEUKOCYTE ESTERASE URINE: NEGATIVE
LYMPHOCYTES # BLD AUTO: 1.84 K/UL
LYMPHOCYTES NFR BLD AUTO: 32.3 %
MAN DIFF?: NORMAL
MCHC RBC-ENTMCNC: 29.6 GM/DL
MCHC RBC-ENTMCNC: 29.7 PG
MCV RBC AUTO: 100.3 FL
MICROALBUMIN 24H UR DL<=1MG/L-MCNC: <1.2 MG/DL
MICROALBUMIN/CREAT 24H UR-RTO: NORMAL MG/G
MONOCYTES # BLD AUTO: 0.39 K/UL
MONOCYTES NFR BLD AUTO: 6.9 %
NEUTROPHILS # BLD AUTO: 3.37 K/UL
NEUTROPHILS NFR BLD AUTO: 59.2 %
NITRITE URINE: NEGATIVE
NT-PROBNP SERPL-MCNC: 153 PG/ML
PH URINE: 6
PLATELET # BLD AUTO: 222 K/UL
POTASSIUM SERPL-SCNC: 3.7 MMOL/L
PROT SERPL-MCNC: 6.7 G/DL
PROTEIN URINE: NEGATIVE
RBC # BLD: 3.77 M/UL
RBC # FLD: 16.8 %
SARS-COV-2 IGG SERPL IA-ACNC: 0.01 INDEX
SARS-COV-2 IGG SERPL QL IA: NEGATIVE
SODIUM SERPL-SCNC: 141 MMOL/L
SPECIFIC GRAVITY URINE: 1
TRIGL SERPL-MCNC: 64 MG/DL
TSH SERPL-ACNC: 11.6 UIU/ML
UROBILINOGEN URINE: NORMAL
WBC # FLD AUTO: 5.69 K/UL

## 2020-08-12 ENCOUNTER — EMERGENCY (EMERGENCY)
Facility: HOSPITAL | Age: 85
LOS: 1 days | Discharge: HOME | End: 2020-08-12
Attending: EMERGENCY MEDICINE | Admitting: EMERGENCY MEDICINE
Payer: COMMERCIAL

## 2020-08-12 VITALS
TEMPERATURE: 98 F | HEIGHT: 61 IN | SYSTOLIC BLOOD PRESSURE: 135 MMHG | RESPIRATION RATE: 16 BRPM | DIASTOLIC BLOOD PRESSURE: 81 MMHG | WEIGHT: 132.06 LBS | HEART RATE: 86 BPM | OXYGEN SATURATION: 97 %

## 2020-08-12 VITALS
HEART RATE: 78 BPM | SYSTOLIC BLOOD PRESSURE: 155 MMHG | TEMPERATURE: 98 F | DIASTOLIC BLOOD PRESSURE: 83 MMHG | OXYGEN SATURATION: 98 % | RESPIRATION RATE: 16 BRPM

## 2020-08-12 DIAGNOSIS — Z98.890 OTHER SPECIFIED POSTPROCEDURAL STATES: Chronic | ICD-10-CM

## 2020-08-12 LAB
ALBUMIN SERPL ELPH-MCNC: 4.2 G/DL — SIGNIFICANT CHANGE UP (ref 3.3–5)
ALP SERPL-CCNC: 70 U/L — SIGNIFICANT CHANGE UP (ref 40–120)
ALT FLD-CCNC: 20 U/L — SIGNIFICANT CHANGE UP (ref 10–45)
ANION GAP SERPL CALC-SCNC: 12 MMOL/L — SIGNIFICANT CHANGE UP (ref 5–17)
APPEARANCE UR: CLEAR — SIGNIFICANT CHANGE UP
APTT BLD: 27.9 SEC — SIGNIFICANT CHANGE UP (ref 27.5–35.5)
AST SERPL-CCNC: 27 U/L — SIGNIFICANT CHANGE UP (ref 10–40)
BASOPHILS # BLD AUTO: 0.02 K/UL — SIGNIFICANT CHANGE UP (ref 0–0.2)
BASOPHILS NFR BLD AUTO: 0.3 % — SIGNIFICANT CHANGE UP (ref 0–2)
BILIRUB SERPL-MCNC: 0.6 MG/DL — SIGNIFICANT CHANGE UP (ref 0.2–1.2)
BILIRUB UR-MCNC: NEGATIVE — SIGNIFICANT CHANGE UP
BLD GP AB SCN SERPL QL: NEGATIVE — SIGNIFICANT CHANGE UP
BUN SERPL-MCNC: 17 MG/DL — SIGNIFICANT CHANGE UP (ref 7–23)
CALCIUM SERPL-MCNC: 9.1 MG/DL — SIGNIFICANT CHANGE UP (ref 8.4–10.5)
CHLORIDE SERPL-SCNC: 102 MMOL/L — SIGNIFICANT CHANGE UP (ref 96–108)
CO2 SERPL-SCNC: 28 MMOL/L — SIGNIFICANT CHANGE UP (ref 22–31)
COLOR SPEC: YELLOW — SIGNIFICANT CHANGE UP
CREAT SERPL-MCNC: 0.86 MG/DL — SIGNIFICANT CHANGE UP (ref 0.5–1.3)
DIFF PNL FLD: ABNORMAL
EOSINOPHIL # BLD AUTO: 0.04 K/UL — SIGNIFICANT CHANGE UP (ref 0–0.5)
EOSINOPHIL NFR BLD AUTO: 0.6 % — SIGNIFICANT CHANGE UP (ref 0–6)
GLUCOSE SERPL-MCNC: 105 MG/DL — HIGH (ref 70–99)
GLUCOSE UR QL: NEGATIVE — SIGNIFICANT CHANGE UP
HCT VFR BLD CALC: 34.8 % — SIGNIFICANT CHANGE UP (ref 34.5–45)
HGB BLD-MCNC: 11.1 G/DL — LOW (ref 11.5–15.5)
IMM GRANULOCYTES NFR BLD AUTO: 0.3 % — SIGNIFICANT CHANGE UP (ref 0–1.5)
INR BLD: 1.01 — SIGNIFICANT CHANGE UP (ref 0.88–1.16)
KETONES UR-MCNC: NEGATIVE — SIGNIFICANT CHANGE UP
LEUKOCYTE ESTERASE UR-ACNC: ABNORMAL
LYMPHOCYTES # BLD AUTO: 1 K/UL — SIGNIFICANT CHANGE UP (ref 1–3.3)
LYMPHOCYTES # BLD AUTO: 14.6 % — SIGNIFICANT CHANGE UP (ref 13–44)
MCHC RBC-ENTMCNC: 30.7 PG — SIGNIFICANT CHANGE UP (ref 27–34)
MCHC RBC-ENTMCNC: 31.9 GM/DL — LOW (ref 32–36)
MCV RBC AUTO: 96.1 FL — SIGNIFICANT CHANGE UP (ref 80–100)
MONOCYTES # BLD AUTO: 0.44 K/UL — SIGNIFICANT CHANGE UP (ref 0–0.9)
MONOCYTES NFR BLD AUTO: 6.4 % — SIGNIFICANT CHANGE UP (ref 2–14)
NEUTROPHILS # BLD AUTO: 5.34 K/UL — SIGNIFICANT CHANGE UP (ref 1.8–7.4)
NEUTROPHILS NFR BLD AUTO: 77.8 % — HIGH (ref 43–77)
NITRITE UR-MCNC: NEGATIVE — SIGNIFICANT CHANGE UP
NRBC # BLD: 0 /100 WBCS — SIGNIFICANT CHANGE UP (ref 0–0)
PH UR: 6.5 — SIGNIFICANT CHANGE UP (ref 5–8)
PLATELET # BLD AUTO: 195 K/UL — SIGNIFICANT CHANGE UP (ref 150–400)
POTASSIUM SERPL-MCNC: 4 MMOL/L — SIGNIFICANT CHANGE UP (ref 3.5–5.3)
POTASSIUM SERPL-SCNC: 4 MMOL/L — SIGNIFICANT CHANGE UP (ref 3.5–5.3)
PROT SERPL-MCNC: 7.2 G/DL — SIGNIFICANT CHANGE UP (ref 6–8.3)
PROT UR-MCNC: NEGATIVE MG/DL — SIGNIFICANT CHANGE UP
PROTHROM AB SERPL-ACNC: 12.1 SEC — SIGNIFICANT CHANGE UP (ref 10.6–13.6)
RBC # BLD: 3.62 M/UL — LOW (ref 3.8–5.2)
RBC # FLD: 16.2 % — HIGH (ref 10.3–14.5)
RH IG SCN BLD-IMP: POSITIVE — SIGNIFICANT CHANGE UP
SODIUM SERPL-SCNC: 142 MMOL/L — SIGNIFICANT CHANGE UP (ref 135–145)
SP GR SPEC: 1.01 — SIGNIFICANT CHANGE UP (ref 1–1.03)
UROBILINOGEN FLD QL: 0.2 E.U./DL — SIGNIFICANT CHANGE UP
WBC # BLD: 6.86 K/UL — SIGNIFICANT CHANGE UP (ref 3.8–10.5)
WBC # FLD AUTO: 6.86 K/UL — SIGNIFICANT CHANGE UP (ref 3.8–10.5)

## 2020-08-12 PROCEDURE — 85610 PROTHROMBIN TIME: CPT

## 2020-08-12 PROCEDURE — 86901 BLOOD TYPING SEROLOGIC RH(D): CPT

## 2020-08-12 PROCEDURE — 99285 EMERGENCY DEPT VISIT HI MDM: CPT

## 2020-08-12 PROCEDURE — 36415 COLL VENOUS BLD VENIPUNCTURE: CPT

## 2020-08-12 PROCEDURE — 86850 RBC ANTIBODY SCREEN: CPT

## 2020-08-12 PROCEDURE — 81001 URINALYSIS AUTO W/SCOPE: CPT

## 2020-08-12 PROCEDURE — 96360 HYDRATION IV INFUSION INIT: CPT | Mod: XU

## 2020-08-12 PROCEDURE — 85730 THROMBOPLASTIN TIME PARTIAL: CPT

## 2020-08-12 PROCEDURE — 80053 COMPREHEN METABOLIC PANEL: CPT

## 2020-08-12 PROCEDURE — 87086 URINE CULTURE/COLONY COUNT: CPT

## 2020-08-12 PROCEDURE — 74177 CT ABD & PELVIS W/CONTRAST: CPT

## 2020-08-12 PROCEDURE — 85025 COMPLETE CBC W/AUTO DIFF WBC: CPT

## 2020-08-12 PROCEDURE — 99284 EMERGENCY DEPT VISIT MOD MDM: CPT | Mod: 25

## 2020-08-12 PROCEDURE — 74177 CT ABD & PELVIS W/CONTRAST: CPT | Mod: 26

## 2020-08-12 RX ORDER — IOHEXOL 300 MG/ML
30 INJECTION, SOLUTION INTRAVENOUS ONCE
Refills: 0 | Status: COMPLETED | OUTPATIENT
Start: 2020-08-12 | End: 2020-08-12

## 2020-08-12 RX ORDER — SODIUM CHLORIDE 9 MG/ML
500 INJECTION INTRAMUSCULAR; INTRAVENOUS; SUBCUTANEOUS ONCE
Refills: 0 | Status: COMPLETED | OUTPATIENT
Start: 2020-08-12 | End: 2020-08-12

## 2020-08-12 RX ADMIN — IOHEXOL 30 MILLILITER(S): 300 INJECTION, SOLUTION INTRAVENOUS at 16:37

## 2020-08-12 RX ADMIN — SODIUM CHLORIDE 500 MILLILITER(S): 9 INJECTION INTRAMUSCULAR; INTRAVENOUS; SUBCUTANEOUS at 16:37

## 2020-08-12 RX ADMIN — SODIUM CHLORIDE 500 MILLILITER(S): 9 INJECTION INTRAMUSCULAR; INTRAVENOUS; SUBCUTANEOUS at 17:27

## 2020-08-12 NOTE — ED ADULT TRIAGE NOTE - CHIEF COMPLAINT QUOTE
Pt CO Rectal Bleeding today.  Pt states "The last time this happened it was years ago and they said it was Diverticulitis."  Pt denies N/V/D, SOB, Fevers, CP and Dizziness.  Masked PTA.

## 2020-08-12 NOTE — ED PROVIDER NOTE - PATIENT PORTAL LINK FT
You can access the FollowMyHealth Patient Portal offered by [facility] by registering at the following website: [email or web address]. By joining [employer]’s FollowMyHealth portal, you will also be able to view your health information using other applications (apps) compatible with our system.

## 2020-08-12 NOTE — ED ADULT NURSE NOTE - NSIMPLEMENTINTERV_GEN_ALL_ED
Implemented All Fall with Harm Risk Interventions:  [place] to call system. Call bell, personal items and telephone within reach. Instruct patient to call for assistance. Room bathroom lighting operational. Non-slip footwear when patient is off stretcher. Physically safe environment: no spills, clutter or unnecessary equipment. Stretcher in lowest position, wheels locked, appropriate side rails in place. Provide visual cue, wrist band, yellow gown, etc. Monitor gait and stability. Monitor for mental status changes and reorient to person, place, and time. Review medications for side effects contributing to fall risk. Reinforce activity limits and safety measures with patient and family. Provide visual clues: red socks.

## 2020-08-12 NOTE — ED PROVIDER NOTE - PROGRESS NOTE DETAILS
CT results reviewed, no back pain, no weakness in legs, ambulating w walker, to fu w pmd, strict return prec given.

## 2020-08-12 NOTE — ED PROVIDER NOTE - OBJECTIVE STATEMENT
89 y/o F pt with PMHx of arthritis, polymyalgia rheumatica, COPD, and HTN, and PSHx of total hysterectomy and bilateral oophorectomy presents to ED c/o lower abdominal pain x 2 days, and dark brown stool dark red streaks of blood today. Pt states her symptoms are similar to her prior hx of diverticulitis. Pt denies vomiting, passing flatus, cough, chest pain, shortness of breath, fevers, chills, or any other acute complaints.

## 2020-08-12 NOTE — ED PROVIDER NOTE - CLINICAL SUMMARY MEDICAL DECISION MAKING FREE TEXT BOX
89 y/o F pt presents to ED with lower abdominal pain that is similar to prior hx of diverticulitis. Pt is well appearing and has minimal tenderness to lower abdomen, but given age will obtain labs, CT, hydrate, and reassess.

## 2020-08-12 NOTE — ED ADULT NURSE REASSESSMENT NOTE - NS ED NURSE REASSESS COMMENT FT1
PT discussed finding with DR. López. PT cleared for dispo and out pt follow up. Grandson at bedside.

## 2020-08-12 NOTE — ED PROVIDER NOTE - TEMPLATE, MLM
CC: right shoulder pain     65 y.o. Male who reports that the pain is severe and not responding to any conservative care.  He was referred to us by Dr. Mobley.     He reports burning pains intermittently and of varying locations to his right lateral elbow, right shoulder and right hand that has been present for 3.5 weeks with no injury or trauma. He also reports intermittent paresthesias of these same areas that sometimes wake him from sleep. His pain and paresthesias area independent of activity. He received a lateral epicondyle steroid injection by Dr. Mobley 2 weeks ago with no pain relief at all.      It is difficult for him to locate or isolate where his pain is coming from today.    He has chronic pain of other areas and takes vicodin for pain control.    Is affecting ADLs.     Review of Systems   Constitution: Negative. Negative for chills, fever and night sweats.   HENT: Negative for congestion and headaches.    Eyes: Negative for blurred vision, left vision loss and right vision loss.   Cardiovascular: Negative for chest pain and syncope.   Respiratory: Negative for cough and shortness of breath.    Endocrine: Negative for polydipsia, polyphagia and polyuria.   Hematologic/Lymphatic: Negative for bleeding problem. Does not bruise/bleed easily.   Skin: Negative for dry skin, itching and rash.   Musculoskeletal: Negative for falls and muscle weakness.   Gastrointestinal: Negative for abdominal pain and bowel incontinence.   Genitourinary: Negative for bladder incontinence and nocturia.   Neurological: Negative for disturbances in coordination, loss of balance and seizures.   Psychiatric/Behavioral: Negative for depression. The patient does not have insomnia.    Allergic/Immunologic: Negative for hives and persistent infections.     PAST MEDICAL HISTORY:   Past Medical History:   Diagnosis Date    Diabetes mellitus     Osteoarthritis      PAST SURGICAL HISTORY: History reviewed. No pertinent  surgical history.  FAMILY HISTORY: History reviewed. No pertinent family history.  SOCIAL HISTORY:   Social History     Social History    Marital status:      Spouse name: N/A    Number of children: N/A    Years of education: N/A     Occupational History    Not on file.     Social History Main Topics    Smoking status: Never Smoker    Smokeless tobacco: Not on file    Alcohol use Yes      Comment: socially/     Drug use: Unknown    Sexual activity: Not on file     Other Topics Concern    Not on file     Social History Narrative    No narrative on file       MEDICATIONS:   Current Outpatient Prescriptions:     amlodipine (NORVASC) 5 MG tablet, TAKE ONE TABLET BY MOUTH EVERY EVENING, Disp: 34 tablet, Rfl: 11    aspirin (ECOTRIN) 81 MG EC tablet, Take 81 mg by mouth once daily., Disp: , Rfl:     atorvastatin (LIPITOR) 40 MG tablet, Take 40 mg by mouth every evening., Disp: , Rfl: 5    bacitracin-neomycin-polymyxin b-hydrocortisone 1 % ointment, Apply topically 2 (two) times daily., Disp: 15 g, Rfl: 1    blood sugar diagnostic Strp, 1 strip by Misc.(Non-Drug; Combo Route) route once daily., Disp: 180 strip, Rfl: 3    blood-glucose meter Misc, Use as directed, Disp: 1 each, Rfl: 0    butabarbital (BUTISOL) 30 mg Tab, Take 30 mg by mouth continuous prn., Disp: , Rfl:     diphenoxylate-atropine 2.5-0.025 mg (LOMOTIL) 2.5-0.025 mg per tablet, Dose after each loose bm up to max 3 per day, Disp: 25 tablet, Rfl: 0    fenofibrate (TRICOR) 145 MG tablet, TAKE ONE TABLET BY MOUTH EVERY EVENING, Disp: 34 tablet, Rfl: 11    glimepiride (AMARYL) 4 MG tablet, Take 1 tablet by mouth once daily at 6am., Disp: , Rfl: 5    lancets Misc, 1 Device by Misc.(Non-Drug; Combo Route) route once daily., Disp: 180 each, Rfl: 3    lisinopril (PRINIVIL,ZESTRIL) 40 MG tablet, TAKE ONE TABLET BY MOUTH EVERY DAY, Disp: 34 tablet, Rfl: 11    losartan (COZAAR) 100 MG tablet, Take 1 tablet (100 mg total) by mouth once  "daily., Disp: 90 tablet, Rfl: 3    meloxicam (MOBIC) 15 MG tablet, Take 1 tablet (15 mg total) by mouth once daily., Disp: 30 tablet, Rfl: 1    multivitamin (MULTIVITAMIN) per tablet, Take 1 tablet by mouth once daily., Disp: , Rfl:     omeprazole (PRILOSEC) 20 MG capsule, TAKE ONE CAPSULE BY MOUTH EVERY DAY, Disp: 34 capsule, Rfl: 11    pen needle, diabetic (RELION PEN NEEDLES) 32 gauge x 5/32" Ndle, Needs levemir flexpen needles. To use once daily, Disp: 100 each, Rfl: 3    pravastatin (PRAVACHOL) 80 MG tablet, TAKE ONE TABLET BY MOUTH EVERY EVENING, Disp: 34 tablet, Rfl: 11    SITagliptan-metformin (JANUMET)  mg per tablet, Take 1 tablet by mouth 2 (two) times daily with meals., Disp: , Rfl:     triamcinolone acetonide 0.1% (KENALOG) 0.1 % cream, Apply 1 application topically continuous prn., Disp: 454 g, Rfl: 11    UBIDECARENONE (CO Q-10 ORAL), Take 1 capsule by mouth once daily., Disp: , Rfl:   ALLERGIES:   Review of patient's allergies indicates:   Allergen Reactions    Codeine Nausea Only       VITAL SIGNS: BP (!) 160/90   Pulse 75   Ht 5' 7" (1.702 m)   Wt 105.2 kg (232 lb)   BMI 36.34 kg/m²      PHYSICAL EXAMINATION:  General:  The patient is alert and oriented x 3.  Mood is pleasant.  Observation of ears, eyes and nose reveal no gross abnormalities.  No labored breathing observed.  Gait is coordinated. Patient can toe walk and heel walk without difficulty.      right Shoulder / Upper Extremity Exam    OBSERVATION:     Swelling  none  Deformity  none   Discoloration  none   Scapular winging none   Scars   none  Atrophy  none    TENDERNESS / CREPITUS (T/C):          T/C      T/C   Clavicle   -/-  SUPRAspinatus    -/-     AC Jt.    -/-  INFRAspinatus  -/-    SC Jt.    -/-  Deltoid    -/-      G. Tuberosity  -/-  LH BICEP groove  -/-   Acromion:  -/-  Midline Neck   -/-     Scapular Spine -/-  Trapezium   -/-   SMA Scapula  -/-  GH jt. line - post  -/-     Scapulothoracic "  -/-         ROM: (* = with pain)  Right shoulder   Left shoulder        AROM (PROM)   AROM (PROM)   FE    170° (175°)     170° (175°)     ER at 0°    60°  (65°)    60°  (65°)   ER at 90° ABD  90°  (90°)    90°  (90°)   IR at 90°  ABD   NA  (40°)     NA  (40°)      IR (spine level)   T12     T10    STRENGTH: (* = with pain) RIGHT SHOULDER  LEFT SHOULDER   SCAPTION   5/5    5/5    IR    5/5    5/5   ER    5/5    5/5   BICEPS   5/5    5/5   Deltoid    5/5    5/5     SIGNS:  Painful side       NEER   -    OPAZS  neg    CORTES   -   SPEEDS  neg     DROP ARM   neg   BELLY PRESS neg   Superior escape none    LIFT-OFF  neg   X-Body ADD    neg    MOVING VALGUS neg        STABILITY TESTING    RIGHT SHOULDER   LEFT SHOULDER     Translation     Anterior  up face     up face    Posterior  up face    up face    Sulcus   < 10mm    < 10 mm     Signs   Apprehension   neg      neg       Relocation   no change     no change      Jerk test  neg     neg    EXTREMITY NEURO-VASCULAR EXAM    Sensation grossly intact to light touch all dermatomal regions.    DTR 2+ Biceps, Triceps, BR and Negative Ledas sign   Grossly intact motor function at Elbow, Wrist and Hand   Distal pulses radial and ulnar 2+, brisk cap refill, symmetric.      NECK:  Painless FROM and spinous processes non-tender. Negative Spurlings sign.      OTHER FINDINGS:    Right Elbow Exam:   Musculoskeletal   Left UE    Upper extremities shows full active and passive range of motion bilaterally of the shoulders and wrist.                            Elbow show full active and passive range of motion, full flexion and extension bilaterally.     Palpation shows no masses    Shoulder stability is normal    Strength to flexion and extension well maintained;    Skin shows no rashes, lesions or cafe au lait spots    Negative tinels, neg phalen's compression test, no thenar atrophy     Right UE    ROM shows normal flexion, extension of elbow and wrist    Palpation  shows no masses    Shoulder stability is normal.     Strength to flexion and extension well maintained    Skin shows no rashes, lesions, abrasions, or cafe au lait spots                          Negative tinels, neg phalen's compression test, no thenar atrophy      Patient has NO pain with palpation over lateral epicondyle and NO pain with resisted wrist extension on the left    ROM:  Elbow (AROM)  Right    Left            Elbow Flexion   165°                  165°      Elbow Extension   0°      0°      Pronation   90     90   Supination   90    90    Elbow STRENGTH:                       RIGHT    LEFT    Elbow Flexion   5/5    5/5    Elbow Extension  5/5    5/5   Hand    5/5    5/5   Supination    5/5    5/5   Pronation              5/5    5/5      Hook test - Negative  Cozen sign - negative  Mills test    - negative  Painful supination - negative    Xrays of the right elbow from PCP were reviewed today:  Xrays of the right elbow were reviewed by me today. No fracture, subluxation, or other significant bony or joint abnormality is identified. Mild DJD noted of the elbow joint.    XRAYS:  Shoulder 3 view series right,  were obtained and reviewed  No convincing fracture or dislocation is noted. The osseous structures appear well mineralized and well aligned. Mild DJD noted of the right shoulder.        ASSESSMENT:   right:  1. Shoulder pain, acute   2.   Elbow pain, acute  3.   Paresthesias and pain of the RUE    Hard for me to find an exact etiology of patient's pain today. Sound like radicular nerve pain to me.     PLAN:    1. Continue OTC pain control +/- current narcotic meds    2. EMG to evaluate for right cervical or upper extremity nerve pathology or impingement.    3. Patient [place]. Will call him with results and to schedule f/u after EMG is completed.       All questions were answered, pt will contact us for questions or concerns in the interim.     Abdominal Pain, N/V/D

## 2020-08-12 NOTE — ED ADULT NURSE NOTE - OBJECTIVE STATEMENT
89y F 89y F A&OX3, presents to ed ambulatory with walker for rectal bleeding today. Reports has previously happened to her years ago and noted it was diverticulitis. Pt denies cp, sob, cough, fevers, chills, lightheadedness, dizziness. Denies n/v/d. No urinary s/s. Minimal tenderness on palpation to abdomen.

## 2020-08-12 NOTE — ED PROVIDER NOTE - NSFOLLOWUPINSTRUCTIONS_ED_ALL_ED_FT
Can take tylenol 650mg every 6hrs as needed for pain.  Can take pepcid 20mg once or twice a day.   Follow up with your primary doctor within 1-2 days.   Return for persistent fever/vomit, uncontrolled pain, difficulty breathing, worsening lightheaded.  Follow up with gastroenterologist for persistent symptoms.     SEEK IMMEDIATE MEDICAL CARE IF YOU HAVE ANY OF THE FOLLOWING SYMPTOMS: worsening abdominal pain, uncontrollable vomiting, profuse diarrhea, inability to have bowel movements or pass gas, black or bloody stools, fever accompanying chest pain or back pain, or fainting. These symptoms may represent a serious problem that is an emergency. Do not wait to see if the symptoms will go away. Get medical help right away. Call 911 and do not drive yourself to the hospital.    Abdominal Pain, Adult    Abdominal pain can be caused by many things. Often, abdominal pain is not serious and it gets better with no treatment or by being treated at home. However, sometimes abdominal pain is serious. Your health care provider will do a medical history and a physical exam to try to determine the cause of your abdominal pain.    Follow these instructions at home:  Take over-the-counter and prescription medicines only as told by your health care provider. Do not take a laxative unless told by your health care provider.  Drink enough fluid to keep your urine clear or pale yellow.  Watch your condition for any changes.  Keep all follow-up visits as told by your health care provider. This is important.    Contact a health care provider if:  Your abdominal pain changes or gets worse.  You are not hungry or you lose weight without trying.  You are constipated or have diarrhea for more than 2–3 days.  You have pain when you urinate or have a bowel movement.  Your abdominal pain wakes you up at night.  Your pain gets worse with meals, after eating, or with certain foods.  You are throwing up and cannot keep anything down.  You have a fever.    Get help right away if:  Your pain does not go away as soon as your health care provider told you to expect.  You cannot stop throwing up.  Your pain is only in areas of the abdomen, such as the right side or the left lower portion of the abdomen.  You have bloody or black stools, or stools that look like tar.  You have severe pain, cramping, or bloating in your abdomen.  You have signs of dehydration, such as:  Dark urine, very little urine, or no urine.  Cracked lips.  Dry mouth.  Sunken eyes.  Sleepiness.  Weakness.    Constipation    Constipation is when a person has fewer than three bowel movements a week, has difficulty having a bowel movement, or has stools that are dry, hard, or larger than normal. Other symptoms can include abdominal pain or bloating. As people grow older, constipation is more common. A low-fiber diet, not taking in enough fluids, and taking certain medicines, including opioid painkillers, may make constipation worse. Treatment varies but may include dietary modifications (more fiber-rich foods), lifestyle modifications, and possible medications.     SEEK IMMEDIATE MEDICAL CARE IF YOU HAVE ANY OF THE FOLLOWING SYMPTOMS: bright red blood in your stool, constipation for longer than 4 days, abdominal or rectal pain, unexplained weight loss, or inability to pass gas.

## 2020-08-12 NOTE — ED ADULT NURSE NOTE - CHPI ED NUR SYMPTOMS NEG
no abdominal distension/no chills/no dysuria/no fever/no hematuria/no nausea/no diarrhea/no vomiting/no burning urination

## 2020-08-14 LAB
CULTURE RESULTS: SIGNIFICANT CHANGE UP
SPECIMEN SOURCE: SIGNIFICANT CHANGE UP

## 2020-08-16 DIAGNOSIS — I10 ESSENTIAL (PRIMARY) HYPERTENSION: ICD-10-CM

## 2020-08-16 DIAGNOSIS — Z79.899 OTHER LONG TERM (CURRENT) DRUG THERAPY: ICD-10-CM

## 2020-08-16 DIAGNOSIS — R10.30 LOWER ABDOMINAL PAIN, UNSPECIFIED: ICD-10-CM

## 2020-08-16 DIAGNOSIS — Z79.1 LONG TERM (CURRENT) USE OF NON-STEROIDAL ANTI-INFLAMMATORIES (NSAID): ICD-10-CM

## 2020-08-16 DIAGNOSIS — K92.1 MELENA: ICD-10-CM

## 2020-08-16 DIAGNOSIS — Z88.8 ALLERGY STATUS TO OTHER DRUGS, MEDICAMENTS AND BIOLOGICAL SUBSTANCES: ICD-10-CM

## 2020-08-16 DIAGNOSIS — J44.9 CHRONIC OBSTRUCTIVE PULMONARY DISEASE, UNSPECIFIED: ICD-10-CM

## 2020-08-16 DIAGNOSIS — Z88.2 ALLERGY STATUS TO SULFONAMIDES: ICD-10-CM

## 2020-08-17 ENCOUNTER — APPOINTMENT (OUTPATIENT)
Dept: CARDIOTHORACIC SURGERY | Facility: CLINIC | Age: 85
End: 2020-08-17
Payer: MEDICARE

## 2020-08-17 VITALS
HEART RATE: 84 BPM | BODY MASS INDEX: 26 KG/M2 | SYSTOLIC BLOOD PRESSURE: 131 MMHG | DIASTOLIC BLOOD PRESSURE: 70 MMHG | TEMPERATURE: 97.5 F | OXYGEN SATURATION: 96 % | WEIGHT: 129 LBS | RESPIRATION RATE: 18 BRPM | HEIGHT: 59 IN

## 2020-08-17 PROCEDURE — 99203 OFFICE O/P NEW LOW 30 MIN: CPT

## 2020-08-18 RX ORDER — PREDNISONE 50 MG/1
TABLET ORAL DAILY
Refills: 0 | Status: ACTIVE | COMMUNITY

## 2020-08-18 RX ORDER — FUROSEMIDE 20 MG/1
20 TABLET ORAL DAILY
Refills: 0 | Status: ACTIVE | COMMUNITY

## 2020-08-18 NOTE — REVIEW OF SYSTEMS
[see HPI] : see HPI [Feeling Tired] : feeling tired [Palpitations] : palpitations [Lower Ext Edema] : lower extremity edema [As Noted in HPI] : as noted in HPI [Shortness Of Breath] : shortness of breath [Negative] : Psychiatric

## 2020-08-20 NOTE — HISTORY OF PRESENT ILLNESS
[FreeTextEntry1] : 89 year old female with a history of hypertension, hyperlipidemia, Bladder Ca, diverticulosis, GI bleed, DM (on oral medications), hypothyroidism, Polymyalgia Rheumatica, osteoporosis, pAtrial tachycardia, chronic diastolic heart failure with aortic stenosis who was referred for further evaluation of her valvular disease. \par \par The patient states she is feeling extremely fatigued.  She complains of shortness of breath after a half of a block, palpitations, and LE edema.  The patient complains of dizziness at times with a history of syncope episodes in the past.  She states that her syncope was related to dehydration.  She denies chest pain, shortness of breath at rest, orthopnea, or PND.  \par \par The patient underwent an ECHO on 08/4/2020 which showed moderate to severe aortic stenosis.\par \par

## 2020-08-20 NOTE — PHYSICAL EXAM
[General Appearance - Well Developed] : well developed [General Appearance - Well Nourished] : well nourished [Normal Appearance] : normal appearance [Well Groomed] : well groomed [Normal Station and Gait] : the gait and station were normal for the patient's age [No Focal Deficits] : no focal deficits [Inguinal Lymph Nodes Enlarged Bilaterally] : inguinal [General Appearance - Alert] : alert [General Appearance - In No Acute Distress] : in no acute distress [Neck Appearance] : the appearance of the neck was normal [] : no respiratory distress [Exaggerated Use Of Accessory Muscles For Inspiration] : no accessory muscle use [Respiration, Rhythm And Depth] : normal respiratory rhythm and effort [Examination Of The Chest] : the chest was normal in appearance [Heart Rate And Rhythm] : heart rate was normal and rhythm regular [Auscultation Breath Sounds / Voice Sounds] : lungs were clear to auscultation bilaterally [Abdomen Soft] : soft [Abdomen Tenderness] : non-tender [Skin Color & Pigmentation] : normal skin color and pigmentation [Oriented To Time, Place, And Person] : oriented to person, place, and time [FreeTextEntry1] : pt walks with a walker

## 2020-09-02 ENCOUNTER — APPOINTMENT (OUTPATIENT)
Dept: UROLOGY | Facility: CLINIC | Age: 85
End: 2020-09-02
Payer: MEDICARE

## 2020-09-02 VITALS
OXYGEN SATURATION: 99 % | BODY MASS INDEX: 26 KG/M2 | TEMPERATURE: 98 F | WEIGHT: 129 LBS | SYSTOLIC BLOOD PRESSURE: 136 MMHG | HEIGHT: 59 IN | RESPIRATION RATE: 16 BRPM | DIASTOLIC BLOOD PRESSURE: 73 MMHG | HEART RATE: 98 BPM

## 2020-09-02 DIAGNOSIS — Z87.442 PERSONAL HISTORY OF URINARY CALCULI: ICD-10-CM

## 2020-09-02 DIAGNOSIS — Z87.440 PERSONAL HISTORY OF URINARY (TRACT) INFECTIONS: ICD-10-CM

## 2020-09-02 PROCEDURE — 99204 OFFICE O/P NEW MOD 45 MIN: CPT

## 2020-09-03 LAB
APPEARANCE: ABNORMAL
BACTERIA: ABNORMAL
BILIRUBIN URINE: NEGATIVE
BLOOD URINE: ABNORMAL
COLOR: NORMAL
GLUCOSE QUALITATIVE U: NEGATIVE
HYALINE CASTS: 0 /LPF
KETONES URINE: NEGATIVE
LEUKOCYTE ESTERASE URINE: ABNORMAL
MICROSCOPIC-UA: NORMAL
NITRITE URINE: POSITIVE
PH URINE: 7
PROTEIN URINE: NEGATIVE
RED BLOOD CELLS URINE: 24 /HPF
SPECIFIC GRAVITY URINE: 1.01
SQUAMOUS EPITHELIAL CELLS: 0 /HPF
UROBILINOGEN URINE: NORMAL
WHITE BLOOD CELLS URINE: 401 /HPF

## 2020-09-07 LAB — BACTERIA UR CULT: ABNORMAL

## 2020-09-07 RX ORDER — AMOXICILLIN AND CLAVULANATE POTASSIUM 500; 125 MG/1; MG/1
500-125 TABLET, FILM COATED ORAL
Qty: 10 | Refills: 0 | Status: ACTIVE | COMMUNITY
Start: 2020-09-07 | End: 1900-01-01

## 2020-09-08 ENCOUNTER — APPOINTMENT (OUTPATIENT)
Dept: CARDIOTHORACIC SURGERY | Facility: HOSPITAL | Age: 85
End: 2020-09-08

## 2020-09-10 ENCOUNTER — OUTPATIENT (OUTPATIENT)
Dept: OUTPATIENT SERVICES | Facility: HOSPITAL | Age: 85
LOS: 1 days | End: 2020-09-10

## 2020-09-10 ENCOUNTER — APPOINTMENT (OUTPATIENT)
Dept: CT IMAGING | Facility: CLINIC | Age: 85
End: 2020-09-10
Payer: MEDICARE

## 2020-09-10 ENCOUNTER — RESULT REVIEW (OUTPATIENT)
Age: 85
End: 2020-09-10

## 2020-09-10 DIAGNOSIS — Z98.890 OTHER SPECIFIED POSTPROCEDURAL STATES: Chronic | ICD-10-CM

## 2020-09-10 PROCEDURE — 74178 CT ABD&PLV WO CNTR FLWD CNTR: CPT | Mod: 26

## 2020-09-11 NOTE — HISTORY OF PRESENT ILLNESS
[FreeTextEntry1] : Dear Dr. Daigle (PCP)\par \par Thank you so much for the referral to help care for your patient.\par \par Chief Complaint: kidney stones\par Date of first visit: 08/31/2020\par \par LOU ALMENDAREZ  is a 89 year old  woman, hx of stones, who presents for a follow up since hospitalization recently. Patient is a poor historian.\par \par Patient recalls, she was in the hospital, she thinks [place], March 2020, with gross hematuria. She had some testing done there and was told to follow up. Records are not presented for review.\par She has known B/L kidney stones and f/u in 6 months. \par \par She was seen in the past by Dr. Garcia, last visit on 05/10/2016, and had surgery for stones.\par  Last CT from [facility] 08/2020, reports small b/l stones. PVR today is 92 mL.\par \par She does not report hematuria now, but reports dysuria and may be UTI again, as she has hx of UTIs.\par The patient denies fevers, chills, nausea and or vomiting and no unexplained weight loss.\par \par All pertinent laboratory, films and physician notes were reviewed.  Questionnaire results were discussed with patient.\par \par

## 2020-09-11 NOTE — PHYSICAL EXAM
[Abdomen Soft] : soft [Abdomen Tenderness] : non-tender [Costovertebral Angle Tenderness] : no ~M costovertebral angle tenderness [FreeTextEntry1] : deferred

## 2020-09-11 NOTE — ASSESSMENT
[FreeTextEntry1] : In summary, 89 year old woman, hx of stones, recent reported gross hematuria. Records not available, in past seen by Dr. Garcia, URS laser in the past, CT Abd from [facility] 08/2020, small b/l stones. PVR 92 mL.\par \par 1. UA UCx today\par 2. CT Urogram to evaluation of gross hematuria and stones\par 3. Cystoscopy in the office\par 4. Needs pelvic exam next visit\par \par Thank you very much for allowing me to assist in the care of this patient. Should you have any additional questions or concerns please do not hesitate to contact me.\par \par Sincerely,\par \par Mitul Walsh D.O.\par  of Urology and Radiology\par  of Urology at [facility]\par System Director for Prostate Cancer\par [address] [place] [address], [address]\par Phone: [phone]\par

## 2020-09-14 ENCOUNTER — FORM ENCOUNTER (OUTPATIENT)
Age: 85
End: 2020-09-14

## 2020-09-14 VITALS
HEART RATE: 80 BPM | SYSTOLIC BLOOD PRESSURE: 149 MMHG | TEMPERATURE: 98 F | RESPIRATION RATE: 16 BRPM | DIASTOLIC BLOOD PRESSURE: 77 MMHG | OXYGEN SATURATION: 97 % | HEIGHT: 60 IN | WEIGHT: 125 LBS

## 2020-09-14 NOTE — ASU PATIENT PROFILE, ADULT - PAIN CHRONIC, PROFILE
Diagnosed with enlarged right groin lymph node 3-4 weeks ago. Over past few days pt states the lymph node has increased in size and pain.  
yes

## 2020-09-15 ENCOUNTER — APPOINTMENT (OUTPATIENT)
Dept: CARDIOTHORACIC SURGERY | Facility: HOSPITAL | Age: 85
End: 2020-09-15

## 2020-09-15 ENCOUNTER — OUTPATIENT (OUTPATIENT)
Dept: OUTPATIENT SERVICES | Facility: HOSPITAL | Age: 85
LOS: 1 days | Discharge: HOME | End: 2020-09-15
Payer: COMMERCIAL

## 2020-09-15 DIAGNOSIS — Z98.890 OTHER SPECIFIED POSTPROCEDURAL STATES: Chronic | ICD-10-CM

## 2020-09-15 LAB
ALBUMIN SERPL ELPH-MCNC: 4.2 G/DL — SIGNIFICANT CHANGE UP (ref 3.3–5)
ALP SERPL-CCNC: 67 U/L — SIGNIFICANT CHANGE UP (ref 40–120)
ALT FLD-CCNC: 21 U/L — SIGNIFICANT CHANGE UP (ref 10–45)
ANION GAP SERPL CALC-SCNC: 12 MMOL/L — SIGNIFICANT CHANGE UP (ref 5–17)
APTT BLD: 26.2 SEC — LOW (ref 27.5–35.5)
AST SERPL-CCNC: 33 U/L — SIGNIFICANT CHANGE UP (ref 10–40)
BILIRUB SERPL-MCNC: 0.4 MG/DL — SIGNIFICANT CHANGE UP (ref 0.2–1.2)
BLD GP AB SCN SERPL QL: NEGATIVE — SIGNIFICANT CHANGE UP
BUN SERPL-MCNC: 10 MG/DL — SIGNIFICANT CHANGE UP (ref 7–23)
CALCIUM SERPL-MCNC: 8.8 MG/DL — SIGNIFICANT CHANGE UP (ref 8.4–10.5)
CHLORIDE SERPL-SCNC: 104 MMOL/L — SIGNIFICANT CHANGE UP (ref 96–108)
CO2 SERPL-SCNC: 25 MMOL/L — SIGNIFICANT CHANGE UP (ref 22–31)
CREAT SERPL-MCNC: 0.75 MG/DL — SIGNIFICANT CHANGE UP (ref 0.5–1.3)
GLUCOSE SERPL-MCNC: 88 MG/DL — SIGNIFICANT CHANGE UP (ref 70–99)
HCT VFR BLD CALC: 35.2 % — SIGNIFICANT CHANGE UP (ref 34.5–45)
HGB BLD-MCNC: 11.3 G/DL — LOW (ref 11.5–15.5)
INR BLD: 1.03 — SIGNIFICANT CHANGE UP (ref 0.88–1.16)
MAGNESIUM SERPL-MCNC: 2 MG/DL — SIGNIFICANT CHANGE UP (ref 1.6–2.6)
MCHC RBC-ENTMCNC: 30.5 PG — SIGNIFICANT CHANGE UP (ref 27–34)
MCHC RBC-ENTMCNC: 32.1 GM/DL — SIGNIFICANT CHANGE UP (ref 32–36)
MCV RBC AUTO: 95.1 FL — SIGNIFICANT CHANGE UP (ref 80–100)
NRBC # BLD: 0 /100 WBCS — SIGNIFICANT CHANGE UP (ref 0–0)
PLATELET # BLD AUTO: 214 K/UL — SIGNIFICANT CHANGE UP (ref 150–400)
POTASSIUM SERPL-MCNC: 3.9 MMOL/L — SIGNIFICANT CHANGE UP (ref 3.5–5.3)
POTASSIUM SERPL-SCNC: 3.9 MMOL/L — SIGNIFICANT CHANGE UP (ref 3.5–5.3)
PROT SERPL-MCNC: 7.5 G/DL — SIGNIFICANT CHANGE UP (ref 6–8.3)
PROTHROM AB SERPL-ACNC: 12.3 SEC — SIGNIFICANT CHANGE UP (ref 10.6–13.6)
RBC # BLD: 3.7 M/UL — LOW (ref 3.8–5.2)
RBC # FLD: 15.7 % — HIGH (ref 10.3–14.5)
RH IG SCN BLD-IMP: POSITIVE — SIGNIFICANT CHANGE UP
SODIUM SERPL-SCNC: 141 MMOL/L — SIGNIFICANT CHANGE UP (ref 135–145)
WBC # BLD: 6.31 K/UL — SIGNIFICANT CHANGE UP (ref 3.8–10.5)
WBC # FLD AUTO: 6.31 K/UL — SIGNIFICANT CHANGE UP (ref 3.8–10.5)

## 2020-09-15 PROCEDURE — C1887: CPT

## 2020-09-15 PROCEDURE — C1769: CPT

## 2020-09-15 PROCEDURE — 36415 COLL VENOUS BLD VENIPUNCTURE: CPT

## 2020-09-15 PROCEDURE — 85027 COMPLETE CBC AUTOMATED: CPT

## 2020-09-15 PROCEDURE — C1725: CPT

## 2020-09-15 PROCEDURE — 83735 ASSAY OF MAGNESIUM: CPT

## 2020-09-15 PROCEDURE — 86900 BLOOD TYPING SEROLOGIC ABO: CPT

## 2020-09-15 PROCEDURE — 75572 CT HRT W/3D IMAGE: CPT | Mod: 26

## 2020-09-15 PROCEDURE — 80053 COMPREHEN METABOLIC PANEL: CPT

## 2020-09-15 PROCEDURE — 70450 CT HEAD/BRAIN W/O DYE: CPT | Mod: 26

## 2020-09-15 PROCEDURE — 86901 BLOOD TYPING SEROLOGIC RH(D): CPT

## 2020-09-15 PROCEDURE — 75572 CT HRT W/3D IMAGE: CPT

## 2020-09-15 PROCEDURE — 74174 CTA ABD&PLVS W/CONTRAST: CPT | Mod: 26

## 2020-09-15 PROCEDURE — 93306 TTE W/DOPPLER COMPLETE: CPT | Mod: 26

## 2020-09-15 PROCEDURE — 86850 RBC ANTIBODY SCREEN: CPT

## 2020-09-15 PROCEDURE — 70450 CT HEAD/BRAIN W/O DYE: CPT

## 2020-09-15 PROCEDURE — 85610 PROTHROMBIN TIME: CPT

## 2020-09-15 PROCEDURE — 93306 TTE W/DOPPLER COMPLETE: CPT

## 2020-09-15 PROCEDURE — 93460 R&L HRT ART/VENTRICLE ANGIO: CPT | Mod: 26

## 2020-09-15 PROCEDURE — C1894: CPT

## 2020-09-15 PROCEDURE — 74174 CTA ABD&PLVS W/CONTRAST: CPT

## 2020-09-15 PROCEDURE — 93456 R HRT CORONARY ARTERY ANGIO: CPT

## 2020-09-15 PROCEDURE — 85730 THROMBOPLASTIN TIME PARTIAL: CPT

## 2020-09-15 RX ORDER — ACETAMINOPHEN 500 MG
650 TABLET ORAL ONCE
Refills: 0 | Status: COMPLETED | OUTPATIENT
Start: 2020-09-15 | End: 2020-09-15

## 2020-09-15 RX ADMIN — Medication 650 MILLIGRAM(S): at 18:31

## 2020-09-15 RX ADMIN — Medication 650 MILLIGRAM(S): at 18:30

## 2020-09-15 NOTE — PROGRESS NOTE ADULT - SUBJECTIVE AND OBJECTIVE BOX
Interventional Cardiology PA SDA Discharge Note    No complaints.   Afebrile, VSS    Ext:    	Groin:    no hematoma, no bruit, dressing C/D/I    Pulses:    intact DP/PT to baseline     A/P:   89 year old female with a history of hypertension, hyperlipidemia, Bladder Ca, diverticulosis, GI bleed, DM (on oral medications), hypothyroidism, Polymyalgia Rheumatica, osteoporosis, pAtrial tachycardia, chronic diastolic heart failure with aortic stenosis who was referred to Dr. Arango for further evaluation of her valvular disease and possible TAVR and underwent diagnostic right and left heart catheterization on 9/15.   -Follow-up with Cardiologist Dr. Mariscal in 1 week  -Stable for discharge as per attending Dr. Arango after bed rest, pt voids, groin remains stable, and 30 min. after ambulation.  -Discharge forms signed and copies in chart

## 2020-09-22 ENCOUNTER — APPOINTMENT (OUTPATIENT)
Dept: UROLOGY | Facility: CLINIC | Age: 85
End: 2020-09-22
Payer: MEDICARE

## 2020-09-22 VITALS — DIASTOLIC BLOOD PRESSURE: 72 MMHG | SYSTOLIC BLOOD PRESSURE: 115 MMHG | TEMPERATURE: 97.2 F

## 2020-09-22 DIAGNOSIS — N39.0 URINARY TRACT INFECTION, SITE NOT SPECIFIED: ICD-10-CM

## 2020-09-22 DIAGNOSIS — N20.0 CALCULUS OF KIDNEY: ICD-10-CM

## 2020-09-22 DIAGNOSIS — R31.0 GROSS HEMATURIA: ICD-10-CM

## 2020-09-22 PROCEDURE — 52000 CYSTOURETHROSCOPY: CPT

## 2020-09-22 RX ORDER — CONJUGATED ESTROGENS 0.62 MG/G
0.62 CREAM VAGINAL
Qty: 1 | Refills: 0 | Status: ACTIVE | COMMUNITY
Start: 2020-09-22 | End: 1900-01-01

## 2020-09-25 ENCOUNTER — APPOINTMENT (OUTPATIENT)
Dept: HEART AND VASCULAR | Facility: CLINIC | Age: 85
End: 2020-09-25
Payer: MEDICARE

## 2020-09-25 VITALS
SYSTOLIC BLOOD PRESSURE: 120 MMHG | RESPIRATION RATE: 16 BRPM | OXYGEN SATURATION: 88 % | HEART RATE: 78 BPM | BODY MASS INDEX: 25.8 KG/M2 | HEIGHT: 59 IN | TEMPERATURE: 98 F | WEIGHT: 128 LBS | DIASTOLIC BLOOD PRESSURE: 80 MMHG

## 2020-09-25 DIAGNOSIS — R09.89 OTHER SPECIFIED SYMPTOMS AND SIGNS INVOLVING THE CIRCULATORY AND RESPIRATORY SYSTEMS: ICD-10-CM

## 2020-09-25 PROCEDURE — 99214 OFFICE O/P EST MOD 30 MIN: CPT

## 2020-09-25 PROCEDURE — 93000 ELECTROCARDIOGRAM COMPLETE: CPT

## 2020-09-25 PROCEDURE — 93880 EXTRACRANIAL BILAT STUDY: CPT

## 2020-09-26 PROBLEM — R09.89 BRUIT OF RIGHT CAROTID ARTERY: Status: ACTIVE | Noted: 2020-08-03

## 2020-09-26 NOTE — DISCUSSION/SUMMARY
[Moderate Aortic Stenosis] : moderate aortic stenosis [Essential Hypertension] : essential hypertension [Stable] : stable [Responding to Treatment] : responding to treatment [None] : none [With Me] : with me [FreeTextEntry1] : carotid duplex scan shows  complex plaques bilaterally  with mild stenoses and bilateral antegrade vertebral arterial flows \par \par Will set up cardiac catheterization with FFR guided  revascularization of the mRCA stenosis \par \par

## 2020-09-26 NOTE — PHYSICAL EXAM
[General Appearance - Well Developed] : well developed [Normal Appearance] : normal appearance [Well Groomed] : well groomed [General Appearance - Well Nourished] : well nourished [No Deformities] : no deformities [General Appearance - In No Acute Distress] : no acute distress [Normal Conjunctiva] : the conjunctiva exhibited no abnormalities [Eyelids - No Xanthelasma] : the eyelids demonstrated no xanthelasmas [No Oral Cyanosis] : no oral cyanosis [Normal Jugular Venous A Waves Present] : normal jugular venous A waves present [Normal Jugular Venous V Waves Present] : normal jugular venous V waves present [No Jugular Venous Jo A Waves] : no jugular venous jo A waves [Respiration, Rhythm And Depth] : normal respiratory rhythm and effort [Exaggerated Use Of Accessory Muscles For Inspiration] : no accessory muscle use [Auscultation Breath Sounds / Voice Sounds] : lungs were clear to auscultation bilaterally [Heart Rate And Rhythm] : heart rate and rhythm were normal [Heart Sounds] : normal S1 and S2 [Systolic grade ___/6] : A grade [unfilled]/6 systolic murmur was heard. [FreeTextEntry1] : walks with walker [Nail Clubbing] : no clubbing of the fingernails [Cyanosis, Localized] : no localized cyanosis [Petechial Hemorrhages (___cm)] : no petechial hemorrhages [Nail Splinter Hemorrhages] : no splinter hemorrhages of the nails [Fingers Osler's Nodes] : Osler's nodes were not seenon the fingers [Skin Color & Pigmentation] : normal skin color and pigmentation [Skin Turgor] : normal skin turgor [] : no rash [No Venous Stasis] : no venous stasis [Skin Lesions] : no skin lesions [No Skin Ulcers] : no skin ulcer [No Xanthoma] : no  xanthoma was observed [Oriented To Time, Place, And Person] : oriented to person, place, and time [Impaired Insight] : insight and judgment were intact [Affect] : the affect was normal [Mood] : the mood was normal [Memory Recent] : recent memory was not impaired [Memory Remote] : remote memory was not impaired [No Anxiety] : not feeling anxious

## 2020-09-26 NOTE — REASON FOR VISIT
[Follow-Up - From Hospitalization] : follow-up of a recent hospitalization for [Aortic Stenosis] : aortic stenosis [Coronary Artery Disease] : coronary artery disease [Dyspnea] : dyspnea [Hyperlipidemia] : hyperlipidemia [Hypertension] : hypertension [FreeTextEntry2] : s/p cardiac catheterization/echo work up of aortic stenosis [FreeTextEntry1] : 89  year old -American female with increasing dyspnea on exertion, dyspnea  and fatigue, as far back as March has past hx of  multiple syncopal episodes. \par \par She has moderate aortic stenosis identified by echocardiogram  with normal LV function

## 2020-09-26 NOTE — ASSESSMENT
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

## 2020-09-26 NOTE — REVIEW OF SYSTEMS
[Recent Weight Gain (___ Lbs)] : no recent weight gain [Feeling Fatigued] : not feeling fatigued [Recent Weight Loss (___ Lbs)] : no recent weight loss [Dyspnea on exertion] : dyspnea during exertion [Lower Ext Edema] : lower extremity edema [Palpitations] : no palpitations [Joint Pain] : joint pain [Joint Swelling] : joint swelling [Dizziness] : dizziness [Negative] : Heme/Lymph

## 2020-09-26 NOTE — HISTORY OF PRESENT ILLNESS
[FreeTextEntry1] : Recent BNP normal\par \par During hospitalization , her cardiac catheterization revealed a severe m RCA stenosis . Plans were discussed regarding referral for  angioplasty. 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 Whipple \par \par Recent X ray showed  new T9 wedge compression fracture  \par

## 2020-10-02 NOTE — ASU PREOP CHECKLIST - BMI (KG/M2)
[FreeTextEntry1] : QUIT DATE: 10/30/20\par \par Ms Clinton will start NRT (14 mcg patch and 4 mg lozenge) for the next few weeks to gradually cut down on her cigarettes with a goal of 10 to less than 5 cigarettes per day. She will start Chantix one week prior to quit date on 10/23/20. I will call her to check in in 2 weeks. \par  24.4

## 2020-10-03 ENCOUNTER — LABORATORY RESULT (OUTPATIENT)
Age: 85
End: 2020-10-03

## 2020-12-23 PROBLEM — Z87.09 HISTORY OF ACUTE BRONCHITIS: Status: RESOLVED | Noted: 2020-04-16 | Resolved: 2020-12-23

## 2021-01-28 PROBLEM — E11.9 TYPE 2 DIABETES MELLITUS WITHOUT COMPLICATIONS: Chronic | Status: ACTIVE | Noted: 2020-10-01

## 2021-01-28 PROBLEM — E78.5 HYPERLIPIDEMIA, UNSPECIFIED: Chronic | Status: ACTIVE | Noted: 2020-10-01

## 2021-01-28 PROBLEM — I25.10 ATHEROSCLEROTIC HEART DISEASE OF NATIVE CORONARY ARTERY WITHOUT ANGINA PECTORIS: Chronic | Status: ACTIVE | Noted: 2020-10-01

## 2021-01-28 PROBLEM — I50.32 CHRONIC DIASTOLIC (CONGESTIVE) HEART FAILURE: Chronic | Status: ACTIVE | Noted: 2020-10-01

## 2021-01-29 DIAGNOSIS — I50.30 UNSPECIFIED DIASTOLIC (CONGESTIVE) HEART FAILURE: ICD-10-CM

## 2021-01-29 DIAGNOSIS — Z01.810 ENCOUNTER FOR PREPROCEDURAL CARDIOVASCULAR EXAMINATION: ICD-10-CM

## 2021-03-05 ENCOUNTER — APPOINTMENT (OUTPATIENT)
Dept: HEART AND VASCULAR | Facility: CLINIC | Age: 86
End: 2021-03-05

## 2021-06-08 ENCOUNTER — INPATIENT (INPATIENT)
Facility: HOSPITAL | Age: 86
LOS: 4 days | Discharge: HOME HEALTH | DRG: 377 | End: 2021-06-13
Attending: SURGERY | Admitting: SURGERY
Payer: COMMERCIAL

## 2021-06-08 VITALS
OXYGEN SATURATION: 95 % | SYSTOLIC BLOOD PRESSURE: 99 MMHG | DIASTOLIC BLOOD PRESSURE: 62 MMHG | HEART RATE: 84 BPM | HEIGHT: 60 IN | WEIGHT: 128.97 LBS | RESPIRATION RATE: 16 BRPM | TEMPERATURE: 98 F

## 2021-06-08 DIAGNOSIS — Z98.890 OTHER SPECIFIED POSTPROCEDURAL STATES: Chronic | ICD-10-CM

## 2021-06-08 LAB
ALBUMIN SERPL ELPH-MCNC: 3.1 G/DL — LOW (ref 3.4–5)
ALP SERPL-CCNC: 63 U/L — SIGNIFICANT CHANGE UP (ref 40–120)
ALT FLD-CCNC: 19 U/L — SIGNIFICANT CHANGE UP (ref 12–42)
ANION GAP SERPL CALC-SCNC: 10 MMOL/L — SIGNIFICANT CHANGE UP (ref 9–16)
APTT BLD: 25.7 SEC — LOW (ref 27.5–35.5)
AST SERPL-CCNC: 20 U/L — SIGNIFICANT CHANGE UP (ref 15–37)
BASOPHILS # BLD AUTO: 0.02 K/UL — SIGNIFICANT CHANGE UP (ref 0–0.2)
BASOPHILS # BLD AUTO: 0.02 K/UL — SIGNIFICANT CHANGE UP (ref 0–0.2)
BASOPHILS NFR BLD AUTO: 0.2 % — SIGNIFICANT CHANGE UP (ref 0–2)
BASOPHILS NFR BLD AUTO: 0.3 % — SIGNIFICANT CHANGE UP (ref 0–2)
BILIRUB SERPL-MCNC: 0.4 MG/DL — SIGNIFICANT CHANGE UP (ref 0.2–1.2)
BLD GP AB SCN SERPL QL: NEGATIVE — SIGNIFICANT CHANGE UP
BUN SERPL-MCNC: 27 MG/DL — HIGH (ref 7–23)
CALCIUM SERPL-MCNC: 7.8 MG/DL — LOW (ref 8.5–10.5)
CHLORIDE SERPL-SCNC: 108 MMOL/L — SIGNIFICANT CHANGE UP (ref 96–108)
CO2 SERPL-SCNC: 25 MMOL/L — SIGNIFICANT CHANGE UP (ref 22–31)
CREAT SERPL-MCNC: 0.84 MG/DL — SIGNIFICANT CHANGE UP (ref 0.5–1.3)
EOSINOPHIL # BLD AUTO: 0.03 K/UL — SIGNIFICANT CHANGE UP (ref 0–0.5)
EOSINOPHIL # BLD AUTO: 0.12 K/UL — SIGNIFICANT CHANGE UP (ref 0–0.5)
EOSINOPHIL NFR BLD AUTO: 0.2 % — SIGNIFICANT CHANGE UP (ref 0–6)
EOSINOPHIL NFR BLD AUTO: 2 % — SIGNIFICANT CHANGE UP (ref 0–6)
GLUCOSE BLDC GLUCOMTR-MCNC: 99 MG/DL — SIGNIFICANT CHANGE UP (ref 70–99)
GLUCOSE SERPL-MCNC: 86 MG/DL — SIGNIFICANT CHANGE UP (ref 70–99)
HCT VFR BLD CALC: 21.7 % — LOW (ref 34.5–45)
HCT VFR BLD CALC: 28.5 % — LOW (ref 34.5–45)
HCT VFR BLD CALC: 28.6 % — LOW (ref 34.5–45)
HCT VFR BLD CALC: 29.3 % — LOW (ref 34.5–45)
HGB BLD-MCNC: 6.8 G/DL — CRITICAL LOW (ref 11.5–15.5)
HGB BLD-MCNC: 9.4 G/DL — LOW (ref 11.5–15.5)
HGB BLD-MCNC: 9.5 G/DL — LOW (ref 11.5–15.5)
HGB BLD-MCNC: 9.5 G/DL — LOW (ref 11.5–15.5)
IMM GRANULOCYTES NFR BLD AUTO: 0.2 % — SIGNIFICANT CHANGE UP (ref 0–1.5)
IMM GRANULOCYTES NFR BLD AUTO: 0.4 % — SIGNIFICANT CHANGE UP (ref 0–1.5)
INR BLD: 1.15 — SIGNIFICANT CHANGE UP (ref 0.88–1.16)
LACTATE SERPL-SCNC: 1.2 MMOL/L — SIGNIFICANT CHANGE UP (ref 0.5–2)
LIDOCAIN IGE QN: 62 U/L — LOW (ref 73–393)
LYMPHOCYTES # BLD AUTO: 0.82 K/UL — LOW (ref 1–3.3)
LYMPHOCYTES # BLD AUTO: 2.06 K/UL — SIGNIFICANT CHANGE UP (ref 1–3.3)
LYMPHOCYTES # BLD AUTO: 35.2 % — SIGNIFICANT CHANGE UP (ref 13–44)
LYMPHOCYTES # BLD AUTO: 6.7 % — LOW (ref 13–44)
MCHC RBC-ENTMCNC: 30.1 PG — SIGNIFICANT CHANGE UP (ref 27–34)
MCHC RBC-ENTMCNC: 30.1 PG — SIGNIFICANT CHANGE UP (ref 27–34)
MCHC RBC-ENTMCNC: 30.2 PG — SIGNIFICANT CHANGE UP (ref 27–34)
MCHC RBC-ENTMCNC: 30.5 PG — SIGNIFICANT CHANGE UP (ref 27–34)
MCHC RBC-ENTMCNC: 31.3 GM/DL — LOW (ref 32–36)
MCHC RBC-ENTMCNC: 32.4 GM/DL — SIGNIFICANT CHANGE UP (ref 32–36)
MCHC RBC-ENTMCNC: 32.9 GM/DL — SIGNIFICANT CHANGE UP (ref 32–36)
MCHC RBC-ENTMCNC: 33.3 GM/DL — SIGNIFICANT CHANGE UP (ref 32–36)
MCV RBC AUTO: 90.2 FL — SIGNIFICANT CHANGE UP (ref 80–100)
MCV RBC AUTO: 91.7 FL — SIGNIFICANT CHANGE UP (ref 80–100)
MCV RBC AUTO: 94.2 FL — SIGNIFICANT CHANGE UP (ref 80–100)
MCV RBC AUTO: 96.4 FL — SIGNIFICANT CHANGE UP (ref 80–100)
MONOCYTES # BLD AUTO: 0.51 K/UL — SIGNIFICANT CHANGE UP (ref 0–0.9)
MONOCYTES # BLD AUTO: 0.52 K/UL — SIGNIFICANT CHANGE UP (ref 0–0.9)
MONOCYTES NFR BLD AUTO: 4.3 % — SIGNIFICANT CHANGE UP (ref 2–14)
MONOCYTES NFR BLD AUTO: 8.7 % — SIGNIFICANT CHANGE UP (ref 2–14)
NEUTROPHILS # BLD AUTO: 10.71 K/UL — HIGH (ref 1.8–7.4)
NEUTROPHILS # BLD AUTO: 3.14 K/UL — SIGNIFICANT CHANGE UP (ref 1.8–7.4)
NEUTROPHILS NFR BLD AUTO: 53.6 % — SIGNIFICANT CHANGE UP (ref 43–77)
NEUTROPHILS NFR BLD AUTO: 88.2 % — HIGH (ref 43–77)
NRBC # BLD: 0 /100 WBCS — SIGNIFICANT CHANGE UP (ref 0–0)
PLATELET # BLD AUTO: 105 K/UL — LOW (ref 150–400)
PLATELET # BLD AUTO: 110 K/UL — LOW (ref 150–400)
PLATELET # BLD AUTO: 132 K/UL — LOW (ref 150–400)
PLATELET # BLD AUTO: 180 K/UL — SIGNIFICANT CHANGE UP (ref 150–400)
POTASSIUM SERPL-MCNC: 4.2 MMOL/L — SIGNIFICANT CHANGE UP (ref 3.5–5.3)
POTASSIUM SERPL-SCNC: 4.2 MMOL/L — SIGNIFICANT CHANGE UP (ref 3.5–5.3)
PROT SERPL-MCNC: 6.8 G/DL — SIGNIFICANT CHANGE UP (ref 6.4–8.2)
PROTHROM AB SERPL-ACNC: 13.7 SEC — HIGH (ref 10.6–13.6)
RBC # BLD: 2.25 M/UL — LOW (ref 3.8–5.2)
RBC # BLD: 3.11 M/UL — LOW (ref 3.8–5.2)
RBC # BLD: 3.12 M/UL — LOW (ref 3.8–5.2)
RBC # BLD: 3.16 M/UL — LOW (ref 3.8–5.2)
RBC # FLD: 15.5 % — HIGH (ref 10.3–14.5)
RBC # FLD: 15.7 % — HIGH (ref 10.3–14.5)
RH IG SCN BLD-IMP: POSITIVE — SIGNIFICANT CHANGE UP
SARS-COV-2 RNA SPEC QL NAA+PROBE: SIGNIFICANT CHANGE UP
SODIUM SERPL-SCNC: 143 MMOL/L — SIGNIFICANT CHANGE UP (ref 132–145)
WBC # BLD: 12.24 K/UL — HIGH (ref 3.8–10.5)
WBC # BLD: 17.04 K/UL — HIGH (ref 3.8–10.5)
WBC # BLD: 5.86 K/UL — SIGNIFICANT CHANGE UP (ref 3.8–10.5)
WBC # BLD: 8.72 K/UL — SIGNIFICANT CHANGE UP (ref 3.8–10.5)
WBC # FLD AUTO: 12.24 K/UL — HIGH (ref 3.8–10.5)
WBC # FLD AUTO: 17.04 K/UL — HIGH (ref 3.8–10.5)
WBC # FLD AUTO: 5.86 K/UL — SIGNIFICANT CHANGE UP (ref 3.8–10.5)
WBC # FLD AUTO: 8.72 K/UL — SIGNIFICANT CHANGE UP (ref 3.8–10.5)

## 2021-06-08 PROCEDURE — 99291 CRITICAL CARE FIRST HOUR: CPT

## 2021-06-08 PROCEDURE — 99222 1ST HOSP IP/OBS MODERATE 55: CPT | Mod: GC

## 2021-06-08 PROCEDURE — 74174 CTA ABD&PLVS W/CONTRAST: CPT | Mod: 26

## 2021-06-08 PROCEDURE — 93010 ELECTROCARDIOGRAM REPORT: CPT

## 2021-06-08 PROCEDURE — 71045 X-RAY EXAM CHEST 1 VIEW: CPT | Mod: 26

## 2021-06-08 RX ORDER — ALBUTEROL 90 UG/1
2 AEROSOL, METERED ORAL EVERY 6 HOURS
Refills: 0 | Status: DISCONTINUED | OUTPATIENT
Start: 2021-06-08 | End: 2021-06-13

## 2021-06-08 RX ORDER — ACETAMINOPHEN 500 MG
650 TABLET ORAL EVERY 6 HOURS
Refills: 0 | Status: DISCONTINUED | OUTPATIENT
Start: 2021-06-08 | End: 2021-06-13

## 2021-06-08 RX ORDER — SODIUM CHLORIDE 9 MG/ML
1000 INJECTION INTRAMUSCULAR; INTRAVENOUS; SUBCUTANEOUS ONCE
Refills: 0 | Status: COMPLETED | OUTPATIENT
Start: 2021-06-08 | End: 2021-06-08

## 2021-06-08 RX ORDER — DEXTROSE 50 % IN WATER 50 %
15 SYRINGE (ML) INTRAVENOUS ONCE
Refills: 0 | Status: DISCONTINUED | OUTPATIENT
Start: 2021-06-08 | End: 2021-06-13

## 2021-06-08 RX ORDER — INSULIN LISPRO 100/ML
VIAL (ML) SUBCUTANEOUS EVERY 6 HOURS
Refills: 0 | Status: DISCONTINUED | OUTPATIENT
Start: 2021-06-08 | End: 2021-06-13

## 2021-06-08 RX ORDER — PANTOPRAZOLE SODIUM 20 MG/1
40 TABLET, DELAYED RELEASE ORAL
Refills: 0 | Status: DISCONTINUED | OUTPATIENT
Start: 2021-06-08 | End: 2021-06-13

## 2021-06-08 RX ORDER — SODIUM CHLORIDE 9 MG/ML
1000 INJECTION, SOLUTION INTRAVENOUS
Refills: 0 | Status: DISCONTINUED | OUTPATIENT
Start: 2021-06-08 | End: 2021-06-13

## 2021-06-08 RX ORDER — GLUCAGON INJECTION, SOLUTION 0.5 MG/.1ML
1 INJECTION, SOLUTION SUBCUTANEOUS ONCE
Refills: 0 | Status: DISCONTINUED | OUTPATIENT
Start: 2021-06-08 | End: 2021-06-13

## 2021-06-08 RX ORDER — CHLORHEXIDINE GLUCONATE 213 G/1000ML
1 SOLUTION TOPICAL
Refills: 0 | Status: DISCONTINUED | OUTPATIENT
Start: 2021-06-08 | End: 2021-06-11

## 2021-06-08 RX ORDER — SODIUM CHLORIDE 9 MG/ML
2000 INJECTION INTRAMUSCULAR; INTRAVENOUS; SUBCUTANEOUS ONCE
Refills: 0 | Status: COMPLETED | OUTPATIENT
Start: 2021-06-08 | End: 2021-06-08

## 2021-06-08 RX ORDER — IPRATROPIUM/ALBUTEROL SULFATE 18-103MCG
3 AEROSOL WITH ADAPTER (GRAM) INHALATION EVERY 6 HOURS
Refills: 0 | Status: DISCONTINUED | OUTPATIENT
Start: 2021-06-08 | End: 2021-06-13

## 2021-06-08 RX ORDER — LEVOTHYROXINE SODIUM 125 MCG
70 TABLET ORAL AT BEDTIME
Refills: 0 | Status: DISCONTINUED | OUTPATIENT
Start: 2021-06-08 | End: 2021-06-10

## 2021-06-08 RX ORDER — HYDROCORTISONE 20 MG
100 TABLET ORAL EVERY 8 HOURS
Refills: 0 | Status: DISCONTINUED | OUTPATIENT
Start: 2021-06-08 | End: 2021-06-10

## 2021-06-08 RX ORDER — DEXTROSE 50 % IN WATER 50 %
12.5 SYRINGE (ML) INTRAVENOUS ONCE
Refills: 0 | Status: DISCONTINUED | OUTPATIENT
Start: 2021-06-08 | End: 2021-06-13

## 2021-06-08 RX ORDER — SOD SULF/SODIUM/NAHCO3/KCL/PEG
2000 SOLUTION, RECONSTITUTED, ORAL ORAL ONCE
Refills: 0 | Status: COMPLETED | OUTPATIENT
Start: 2021-06-08 | End: 2021-06-08

## 2021-06-08 RX ORDER — DEXTROSE 50 % IN WATER 50 %
25 SYRINGE (ML) INTRAVENOUS ONCE
Refills: 0 | Status: DISCONTINUED | OUTPATIENT
Start: 2021-06-08 | End: 2021-06-13

## 2021-06-08 RX ORDER — LEVOTHYROXINE SODIUM 125 MCG
88 TABLET ORAL DAILY
Refills: 0 | Status: DISCONTINUED | OUTPATIENT
Start: 2021-06-08 | End: 2021-06-08

## 2021-06-08 RX ADMIN — Medication 100 MILLIGRAM(S): at 21:37

## 2021-06-08 RX ADMIN — SODIUM CHLORIDE 2000 MILLILITER(S): 9 INJECTION INTRAMUSCULAR; INTRAVENOUS; SUBCUTANEOUS at 11:54

## 2021-06-08 RX ADMIN — Medication 2000 MILLILITER(S): at 16:36

## 2021-06-08 RX ADMIN — Medication 650 MILLIGRAM(S): at 23:12

## 2021-06-08 RX ADMIN — Medication 3 MILLILITER(S): at 23:12

## 2021-06-08 RX ADMIN — SODIUM CHLORIDE 1000 MILLILITER(S): 9 INJECTION INTRAMUSCULAR; INTRAVENOUS; SUBCUTANEOUS at 13:00

## 2021-06-08 RX ADMIN — PANTOPRAZOLE SODIUM 40 MILLIGRAM(S): 20 TABLET, DELAYED RELEASE ORAL at 17:46

## 2021-06-08 RX ADMIN — Medication 3 MILLILITER(S): at 17:46

## 2021-06-08 RX ADMIN — SODIUM CHLORIDE 1000 MILLILITER(S): 9 INJECTION INTRAMUSCULAR; INTRAVENOUS; SUBCUTANEOUS at 15:00

## 2021-06-08 NOTE — H&P ADULT - ASSESSMENT
89F PMH HTN, HLD, DM, hypothyroidism, CAD (s/p cardiac cath), moderate AS, chronic diastolic CHF, Asthma/COPD (last hospitalization years ago, denies intubations), PMR (on MTX and Prednisone), osteoarthritis, h/o diverticulitis and LGIB (previously admission 9/2021 presents to [facility] w/ blood per rectum. At presentation hypotensive to 85/54, WBC 8.72, Hgb 6.8. CT performed revealed no active bleed, fecal impaction, and opacities in left lower lobe. Presentation 2/2 acute GIB.    Recommend admission to Dr. Dumont, SICU, Team 4  NPO/IVF  GI consult for Colonoscopy  2U PRBC for Acute Blood loss anemia   Hold AC  A-line for close BP monitoring   Plan discussed w/ attending and chief resident

## 2021-06-08 NOTE — CONSULT NOTE ADULT - SUBJECTIVE AND OBJECTIVE BOX
HPI:      PMH:  PSH:  Meds:  Allerg:  SH:  FH:    PAST MEDICAL & SURGICAL HISTORY:  HTN (hypertension)    COPD (chronic obstructive pulmonary disease)    Arthritis    Polymyalgia rheumatica    Hyperlipidemia    Chronic diastolic heart failure    Coronary artery disease    Diabetes    Moderate aortic stenosis    H/O bilateral oophorectomy    H/O total hysterectomy        MEDICATIONS  (STANDING):  chlorhexidine 2% Cloths 1 Application(s) Topical <User Schedule>  chlorhexidine 4% Liquid 1 Application(s) Topical <User Schedule>  pantoprazole  Injectable 40 milliGRAM(s) IV Push two times a day    MEDICATIONS  (PRN):      Allergies    Fosamax (Angioedema)  lisinopril (Angioedema)  sulfa drugs (Hives)  tetanus immune globulin (Hives)    Intolerances        SOCIAL HISTORY:    FAMILY HISTORY:      REVIEW OF SYSTEMS      General:	    Skin/Breast:  	  Ophthalmologic:  	  ENMT:	    Respiratory and Thorax:  	  Cardiovascular:	    Gastrointestinal:	    Genitourinary:	    Musculoskeletal:	    Neurological:	    Psychiatric:	    Hematology/Lymphatics:	    Endocrine:	    Allergic/Immunologic:	    ICU Vital Signs Last 24 Hrs  T(C): 37 (08 Jun 2021 12:14), Max: 37 (08 Jun 2021 11:56)  T(F): 98.6 (08 Jun 2021 12:14), Max: 98.6 (08 Jun 2021 11:56)  HR: 75 (08 Jun 2021 13:50) (60 - 84)  BP: 91/56 (08 Jun 2021 13:50) (55/40 - 145/68)  BP(mean): 65 (08 Jun 2021 13:50) (48 - 93)  ABP: --  ABP(mean): --  RR: 19 (08 Jun 2021 13:50) (16 - 19)  SpO2: 100% (08 Jun 2021 13:50) (95% - 100%)      I&O's Summary      Baird:	  [ ] None	[ ] Daily Baird Order Placed	   Indication:	  [ ] Strict I and O's    [ ] Obstruction     [ ] Incontinence + Stage 3 or 4 Decubitus  Central Line:  [ ] None	   [ ]  Medication / TPN Administration       Physical Exam:  General: NAD  HEENT: NC/AT, EOMI, PERRLA, normal hearing, no oral lesions, neck supple w/o LAD  Pulmonary: Nonlabored breathing, no respiratory distress, CTA-B  Cardiovascular: NSR, no murmurs  Abdominal: soft, NT/ND, +BS, no organomegaly  Extremities: WWP, 5/5 strength x 4, no clubbing/cyanosis/edema  Neuro: A/O x3, CNs II-XII grossly intact, normal motor/sensation, no focal deficits  Pulses:   Right:                                                                          Left:  FEM [ ]2+ [ ]1+ [ ]doppler                                             FEM [ ]2+ [ ]1+ [ ]doppler    POP [ ]2+ [ ]1+ [ ]doppler                                             POP [ ]2+ [ ]1+ [ ]doppler    DP [ ]2+ [ ]1+ [ ]doppler                                                DP [ ]2+ [ ]1+ [ ]doppler  PT[ ]2+ [ ]1+ [ ]doppler                                                  PT [ ]2+ [ ]1+ [ ]doppler    Lines/tubes/drains:    Vent settings:      LABS:                        6.8    8.72  )-----------( 132      ( 08 Jun 2021 13:34 )             21.7     06-08    143  |  108  |  27<H>  ----------------------------<  86  4.2   |  25  |  0.84    Ca    7.8<L>      08 Jun 2021 11:15    TPro  6.8  /  Alb  3.1<L>  /  TBili  0.4  /  DBili  x   /  AST  20  /  ALT  19  /  AlkPhos  63  06-08    PT/INR - ( 08 Jun 2021 11:22 )   PT: 13.7 sec;   INR: 1.15          PTT - ( 08 Jun 2021 11:22 )  PTT:25.7 sec    CAPILLARY BLOOD GLUCOSE        LIVER FUNCTIONS - ( 08 Jun 2021 11:15 )  Alb: 3.1 g/dL / Pro: 6.8 g/dL / ALK PHOS: 63 U/L / ALT: 19 U/L / AST: 20 U/L / GGT: x             Cultures:      RADIOLOGY & ADDITIONAL STUDIES:     HPI: 89F PMH HTN, HLD, DM, hypothyroidism, CAD (s/p CHETAN mRCA in [facility]), moderate AS, chronic diastolic CHF, Asthma/COPD (last hospitalization years ago, denies intubations), PMR (on MTX and prednisone), osteoarthritis, h/o diverticulitis and h/o GIB (which required admission last fall). She presented this morning to [facility] ED with 1 day history of bloody stool. While in ED, she was hypotensive to SBP 80s/60s, which responded to 1 L NS bolus.     She was then transferred to [facility] SICU. En route, her BP was normal and she received her 2nd L of NS bolus. Pt was received in unit and able to provide part of her history with help from her home aide. She reports 3 bloody BMs with dark clots since last night that was associated with LH/dizziness and abdominal "discomfort". She denies N/V, CP/SOB, dysuria. States she had a colonoscopy last fall but unable to remember what was seen/done. At this point in the interview, pt became unresponsive and her BP dropped to systolics of 80s. She was given 3rd L of NS which improved her mental status and pRBC was ordered stat.     PMH: HTN, HLD, DM, hypothyroidism, CAD (s/p CHETAN mRCA in [facility]), moderate AS, chronic diastolic CHF, Asthma/COPD (last hospitalization years ago, denies intubations), PMR (on MTX and prednisone), osteoarthritis, h/o diverticulitis and h/o GIB    PSH:  bilateral oophorectomy, total hysterectomy    Med list provided by home aide: Synthroid 88mg, lasix 20mg, gabapentin 600mg daily, amlodipine 5mg, allopurinol 100 daily, raloxifene 60mg daily, methotrexate 2.5 4per week, prednisone 7mg, KCl 20mEq daily, folic acid 1mg daily, vit B, mag 500 daily, diclofenac 50 daily, tizanidine 2mg daily, ASA/Butal/Caff 325/50, meloxicam 7.5mg daily, Onora 62.5/25 1 puff daily, ventolin prn, nasonex prn, allegra prn    Allerg: Fosamax, Lisinopril, tetanus, immune globulin, sulfa    PAST MEDICAL & SURGICAL HISTORY:  HTN (hypertension)  COPD (chronic obstructive pulmonary disease)  Arthritis  Polymyalgia rheumatica  Hyperlipidemia  Chronic diastolic heart failure  Coronary artery disease  Diabetes  Moderate aortic stenosis  H/O bilateral oophorectomy  H/O total hysterectomy    ICU Vital Signs Last 24 Hrs  T(C): 37 (08 Jun 2021 12:14), Max: 37 (08 Jun 2021 11:56)  T(F): 98.6 (08 Jun 2021 12:14), Max: 98.6 (08 Jun 2021 11:56)  HR: 75 (08 Jun 2021 13:50) (60 - 84)  BP: 91/56 (08 Jun 2021 13:50) (55/40 - 145/68)  BP(mean): 65 (08 Jun 2021 13:50) (48 - 93)  ABP: --  ABP(mean): --  RR: 19 (08 Jun 2021 13:50) (16 - 19)  SpO2: 100% (08 Jun 2021 13:50) (95% - 100%)    I&O's Summary    Baird:	  [ ] None	[x] Daily Baird Order Placed	   Indication:	  [x] Strict I and O's    [ ] Obstruction     [ ] Incontinence + Stage 3 or 4 Decubitus  Central Line:  [x] None	   [ ]  Medication / TPN Administration       Physical Exam:  General: AxOx3, NAD, responds appropriately to questions and relatively reliable historian.  HEENT: MMM, normal hearing, neck supple w/o LAD  Pulmonary: Nonlabored breathing, no respiratory distress, CTA-B  Cardiovascular: Systolic murmur, RRR  Abdominal: soft, NT/ND, +BS, no organomegaly, No rectal masses palpated, bloody stool noted on underwear and gross red blood noted.   Extremities: WWP, Pitting 2+ edema  Neuro: no focal deficits  Pulses: Radial pulses 2+    LABS:                        6.8    8.72  )-----------( 132      ( 08 Jun 2021 13:34 )             21.7     06-08    143  |  108  |  27<H>  ----------------------------<  86  4.2   |  25  |  0.84    Ca    7.8<L>      08 Jun 2021 11:15    TPro  6.8  /  Alb  3.1<L>  /  TBili  0.4  /  DBili  x   /  AST  20  /  ALT  19  /  AlkPhos  63  06-08    PT/INR - ( 08 Jun 2021 11:22 )   PT: 13.7 sec;   INR: 1.15       PTT - ( 08 Jun 2021 11:22 )  PTT:25.7 sec    CAPILLARY BLOOD GLUCOSE    LIVER FUNCTIONS - ( 08 Jun 2021 11:15 )  Alb: 3.1 g/dL / Pro: 6.8 g/dL / ALK PHOS: 63 U/L / ALT: 19 U/L / AST: 20 U/L / GGT: x           Cultures:    RADIOLOGY & ADDITIONAL STUDIES:  Pending read of CTA A/P

## 2021-06-08 NOTE — CONSULT NOTE ADULT - SUBJECTIVE AND OBJECTIVE BOX
GASTROENTEROLOGY CONSULT NOTE  HPI:  89F PMH HTN, HLD, DM, hypothyroidism, CAD (cardiac cath), moderate AS, chronic diastolic CHF, Asthma/COPD (last hospitalization years ago, denies intubations), PMR (on MTX and Prednisone), osteoarthritis, h/o diverticulitis and LGIB (previously admission 9/2021 presents to [facility] w/ blood per rectum. Patient states the night prior to admission she noted dark bloody BM w/ blood filling the toilet bowl and on tissue. Recent episode similar to prior. Additional episode this am associated with lightheadedness and lower abdominal discomfort. Denies cp, sob, fevers, chills, nausea, emesis. Colonoscopy in the distant past. (08 Jun 2021 13:11)    GI consulted for hematochezia. Patient seen and examined at bedside.     Allergies    Fosamax (Angioedema)  lisinopril (Angioedema)  sulfa drugs (Hives)  tetanus immune globulin (Hives)    Intolerances      Home Medications:  amLODIPine 5 mg oral tablet: 1 tab(s) orally once a day (08 Jun 2021 16:29)  aspirin 325 mg oral tablet: 1 tab(s) orally every 4 hours (08 Jun 2021 16:31)  gabapentin 400 mg oral capsule: orally once a day (18 Nov 2019 07:34)  Lasix 20 mg oral tablet: 1 tab(s) orally once a day (18 Nov 2019 07:34)  meloxicam 7.5 mg oral tablet: 1 tab(s) orally once a day (08 Jun 2021 16:31)  methotrexate 2.5 mg oral tablet: 1  orally 4 times a week (08 Jun 2021 16:30)  Norvasc 5 mg oral tablet: 1 tab(s) orally once a day (18 Nov 2019 07:34)  predniSONE: 7 milligram(s) orally once a day (08 Jun 2021 17:34)  Synthroid 88 mcg (0.088 mg) oral tablet: 1 tab(s) orally once a day (08 Jun 2021 16:29)    MEDICATIONS:  MEDICATIONS  (STANDING):  albuterol/ipratropium for Nebulization 3 milliLiter(s) Nebulizer every 6 hours  chlorhexidine 2% Cloths 1 Application(s) Topical <User Schedule>  chlorhexidine 4% Liquid 1 Application(s) Topical <User Schedule>  dextrose 40% Gel 15 Gram(s) Oral once  dextrose 5%. 1000 milliLiter(s) (50 mL/Hr) IV Continuous <Continuous>  dextrose 5%. 1000 milliLiter(s) (100 mL/Hr) IV Continuous <Continuous>  dextrose 50% Injectable 25 Gram(s) IV Push once  dextrose 50% Injectable 12.5 Gram(s) IV Push once  glucagon  Injectable 1 milliGRAM(s) IntraMuscular once  hydrocortisone sodium succinate Injectable 100 milliGRAM(s) IV Push every 8 hours  insulin lispro (ADMELOG) corrective regimen sliding scale   SubCutaneous every 6 hours  levothyroxine Injectable 70 MICROGram(s) IV Push at bedtime  pantoprazole  Injectable 40 milliGRAM(s) IV Push two times a day    MEDICATIONS  (PRN):  ALBUTerol    90 MICROgram(s) HFA Inhaler 2 Puff(s) Inhalation every 6 hours PRN Shortness of Breath    PAST MEDICAL & SURGICAL HISTORY:  HTN (hypertension)    COPD (chronic obstructive pulmonary disease)    Arthritis    Polymyalgia rheumatica    Hyperlipidemia    Chronic diastolic heart failure    Coronary artery disease    Diabetes    Moderate aortic stenosis    H/O bilateral oophorectomy    H/O total hysterectomy      FAMILY HISTORY:    SOCIAL HISTORY:  Tobacco: [ ] Current, [ ] Former, [ ] Never; Pack Years:  Alcohol:  Illicit Drugs:    REVIEW OF SYSTEMS:  CONSTITUTIONAL: No weakness, fevers or chills  HEENT: No visual changes; No vertigo or throat pain   NECK: No pain or stiffness  RESPIRATORY: No cough, wheezing, hemoptysis; No shortness of breath  CARDIOVASCULAR: No chest pain or palpitations  GASTROINTESTINAL: As above.  GENITOURINARY: No dysuria, frequency or hematuria  NEUROLOGICAL: No numbness or weakness  SKIN: No itching, burning, rashes, or lesions   All other 10 review of systems is negative unless indicated above.    Vital Signs Last 24 Hrs  T(C): 36.8 (08 Jun 2021 17:27), Max: 37 (08 Jun 2021 11:56)  T(F): 98.3 (08 Jun 2021 17:27), Max: 98.6 (08 Jun 2021 11:56)  HR: 92 (08 Jun 2021 19:00) (60 - 92)  BP: 129/62 (08 Jun 2021 19:00) (55/40 - 145/68)  BP(mean): 88 (08 Jun 2021 19:00) (48 - 102)  RR: 18 (08 Jun 2021 19:00) (16 - 20)  SpO2: 97% (08 Jun 2021 19:00) (95% - 100%)    06-08 @ 07:01  -  06-08 @ 19:49  --------------------------------------------------------  IN: 2600 mL / OUT: 1850 mL / NET: 750 mL        PHYSICAL EXAM:    General: in no acute distress  Eyes: Anicteric sclerae, moist conjunctivae  HENT: Moist mucous membranes  Neck: Trachea midline, supple  Lungs: Normal respiratory effort, no intercostal retractions  Cardiovascular: RRR  Abdomen: Soft, non-tender non-distended; No rebound or guarding  Extremities: Normal range of motion, No clubbing, cyanosis or edema  Neurological: Alert and oriented x3  Skin: Warm and dry. No obvious rash    LABS:                        6.8    8.72  )-----------( 132      ( 08 Jun 2021 13:34 )             21.7     06-08    143  |  108  |  27<H>  ----------------------------<  86  4.2   |  25  |  0.84    Ca    7.8<L>      08 Jun 2021 11:15    TPro  6.8  /  Alb  3.1<L>  /  TBili  0.4  /  DBili  x   /  AST  20  /  ALT  19  /  AlkPhos  63  06-08        PT/INR - ( 08 Jun 2021 11:22 )   PT: 13.7 sec;   INR: 1.15          PTT - ( 08 Jun 2021 11:22 )  PTT:25.7 sec    RADIOLOGY & ADDITIONAL STUDIES:     Reviewed

## 2021-06-08 NOTE — ED PROVIDER NOTE - CLINICAL SUMMARY MEDICAL DECISION MAKING FREE TEXT BOX
89 y/o female with PMHx of diverticulosis who is not on blood thinners presents with dark red blood in stool for 2 days.  Abdomen is soft nontender. Dark red clots of blood in rectal vault. On exam initially BP was in 90s over 60s. During exam patient drooped bp to 50s over 30s bp improved to 100s over 60s with iv fluid. Hemoglobin 9.5 down from baseline of 11.3. Concern for active GI bleed; likely diverticular bleed. Discussed case with ICU team and surgical team at [facility]. Will transfer patient emergently to surgical ICU. Spoke to patient's daughter. Patient is full code.

## 2021-06-08 NOTE — ED PROVIDER NOTE - PHYSICAL EXAMINATION
VITAL SIGNS: I have reviewed nursing notes and confirm.  CONSTITUTIONAL: Well-developed; well-nourished; in no acute distress.  SKIN: Skin is warm and dry, no acute rash.  HEAD: Normocephalic; atraumatic.  EYES: PERRL, EOM intact; conjunctiva and sclera clear.  ENT: No nasal discharge; airway clear.  NECK: Supple; non tender.  CARD: S1, S2 normal; no murmurs, gallops, or rubs. Regular rate and rhythm.  RESP: No wheezes, rales or rhonchi.  ABD: Normal bowel sounds; soft; mildly distended  EXT: 2 + pitting edemea in bilateral lower extremity to mid calf. Normal ROM. No clubbing, cyanosis or edema.  NEURO: Alert, oriented. Grossly unremarkable.  PSYCH: Cooperative, appropriate.  RECTAL: Dark red blood clots in rectal vault no stool in rectal vault.

## 2021-06-08 NOTE — ED PROVIDER NOTE - PMH
Arthritis    Chronic diastolic heart failure    COPD (chronic obstructive pulmonary disease)    Coronary artery disease    Diabetes    HTN (hypertension)    Hyperlipidemia    Moderate aortic stenosis    Polymyalgia rheumatica

## 2021-06-08 NOTE — ED ADULT NURSE NOTE - OBJECTIVE STATEMENT
Pt is a 89y female complaining of bloody stools since last night. Pt states that stool are bright red. Pt states that this has happened to her in the past. Pt denies chest pain, sob and dizziness but states that she is lightheaded at time. Pt ambulated with walker at baseline. bed alarm in place. HHA at bedside.

## 2021-06-08 NOTE — CONSULT NOTE ADULT - ASSESSMENT
89F PMH HTN, HLD, DM, hypothyroidism, CAD (cardiac cath), moderate AS, chronic diastolic CHF, Asthma/COPD (last hospitalization years ago, denies intubations), PMR (on MTX and Prednisone), osteoarthritis, h/o diverticulitis and LGIB (previously admission 9/2021 presents to [facility] w/ blood per rectum. GI consulted for hematochezia.     #Hematochezia  - Maintain active T&S, large bore IV access  - Transfusion threshold per primary team  - continue prep w/ golytely, patient can continue drinking prep until 5 AM, strict NPO following that time for bedside colonoscopy at 7-7:30 AM  - consent obtained and placed in chart    d/w Dr. Keshawn Hameed MD  PGY-4, Gastroenterology Fellow  pager: [phone]
89 y/o F PMHx HTN, Hyperlipidemia, DM, hypothyroidism, CAD (s/p CHETAN mRCA in [facility]), moderate AS, hypothyroidism, chronic diastolic CHF, Asthma/COPD (last hospitalization years ago, denies intubations), polymyalgia rheumatica, osteoarthritis, h/o GIB and diverticulitis s/p blood transfusion p/w LGIB. Admitted to SICU for HD monitoring.     Shortly after arrival to unit, pt became unresponsive and hypotensive, secondary to volume depletion/acute GI bleed.     NEURO: Pain controlled, mental status improved with fluid resuscitation and transfusion  CV: CAD, Echo 9/15-EF 64 %, mod Aortic stenosis, holding BP meds, TTE pending  PULM: on 2L NC, duonebs, albuterol PRN  GI/FEN: GI consult, NPO/IVF, PPI, NTD from IR perspective, Bowel prep ordered for colonoscopy tomorrow. (Dr. Liao)  : Baird  ENDO: ISS, Synthroid   HEME: s/p 1U pRBC, serial CBC 8pm, 1am  PPx: Holding AC  LINES: PIV, A-line placed (6/8--)  WOUNDS: none  PT: not ordered  Dispo: SICU

## 2021-06-08 NOTE — CHART NOTE - NSCHARTNOTEFT_GEN_A_CORE
Pt noted to be tachycardic by nurse to 105. Pt seen at bedside   C/o some discomfort with breathing, otherwise denies pain or weakness. States she feels chilly and that shes always cold but is very warm to touch.   Vitals P: 104, BP 140s Systolic, RR 20, O2 sat 99 on RA.     On exam  NAD resting in bed with multiple layers of blankets on her, Alert awake, Pulm: No accessory muscle use noted, + Rales noted in left base. with decreased bs b/l. Cardiac: HS 5/6 murmur noted, Abd soft nt nd + Hyperactive BS, : Baird in place draining clear urine. Pt warm well perfused throughout. On POCUS B-lines found throughout no effusion noted.     Plan:   Temp to be done by nurse, CBC and CXR ordered.  will follow Pt noted to be tachycardic by nurse to 105. Pt seen at bedside   C/o some discomfort with breathing, otherwise denies pain or weakness. States she feels chilly and that shes always cold but is very warm to touch.   Vitals P: 104,  Systolic, RR 20, O2 sat 99 on RA.     On exam  NAD resting in bed with multiple layers of blankets on her, A&o x3, Pulm: No accessory muscle use noted, + Rales noted in left base. with decreased bs b/l. Cardiac: HS 5/6 murmur noted, Abd soft nt nd + Hyperactive BS, : Baird in place draining clear urine. Pt warm well perfused throughout L&U Ext. On POCUS B-lines found throughout no effusion noted.     Plan:   Temp to be done by nurse, CBC and CXR ordered.  will follow

## 2021-06-08 NOTE — ED PROVIDER NOTE - OBJECTIVE STATEMENT
89 y/o female with PMHx of Diverticulosis presents with dark red bloody stool for 2 days. Blood was mixed with stool and hitting the bowl last night. Patient has associated mild abdominal discomfort diffusely but no severe abdominal pain. No chest pain, SOB, no lightheadedness or syncope. Patient has had similar symptoms in the past which have resolved on their own. Patient is not on blood thinners. Patient denies fever, chills, nausea and vomiting.

## 2021-06-08 NOTE — ED ADULT NURSE NOTE - NS ED NURSE DISCH DISPOSITION
"[facility]   Chief Complaint   Patient presents with   • N/V     intermittent symptoms over the past month, seen here previously for same   • Abdominal Pain     epigastric pain intermittent over the past month     Patient awake, alert, developmentally appropriate for age. Skin pink warm and dry, Respirations even and unlabored. C/O nausea at this time, Currently taking zofran and acid reflux medicine for same. Has GI f/u pending but was told to go to ER for persistent symptoms.  Patient to lobby pending call back to room. Advised to notify staff of any changes and or concerns.     /85   Pulse (!) 104   Resp (!) 22   Ht 1.575 m (5' 2\")   Wt 48.9 kg (107 lb 12.9 oz)   LMP 01/30/2020 (Exact Date)   SpO2 98%   BMI 19.72 kg/m²     " Admitted

## 2021-06-08 NOTE — H&P ADULT - NSICDXPASTMEDICALHX_GEN_ALL_CORE_FT
PAST MEDICAL HISTORY:  Arthritis     Chronic diastolic heart failure     COPD (chronic obstructive pulmonary disease)     Coronary artery disease     Diabetes     HTN (hypertension)     Hyperlipidemia     Moderate aortic stenosis     Polymyalgia rheumatica

## 2021-06-08 NOTE — H&P ADULT - HISTORY OF PRESENT ILLNESS
89F PMH HTN, HLD, DM, hypothyroidism, CAD (cardiac cath), moderate AS, chronic diastolic CHF, Asthma/COPD (last hospitalization years ago, denies intubations), PMR (on MTX and Prednisone), osteoarthritis, h/o diverticulitis and LGIB (previously admission 9/2021 presents to [facility] w/ blood per rectum. Patient states the night prior to admission she noted dark bloody BM w/ blood filling the toilet bowl and on tissue. Recent episode similar to prior. Additional episode this am associated with lightheadedness and lower abdominal discomfort. Denies cp, sob, fevers, chills, nausea, emesis. Colonoscopy in the distant past.

## 2021-06-08 NOTE — H&P ADULT - NSHPPHYSICALEXAM_GEN_ALL_CORE
Vital Signs Last 24 Hrs  T(C): 37 (08 Jun 2021 12:14), Max: 37 (08 Jun 2021 11:56)  T(F): 98.6 (08 Jun 2021 12:14), Max: 98.6 (08 Jun 2021 11:56)  HR: 75 (08 Jun 2021 13:50) (60 - 84)  BP: 91/56 (08 Jun 2021 13:50) (55/40 - 145/68)  BP(mean): 65 (08 Jun 2021 13:50) (48 - 93)  RR: 19 (08 Jun 2021 13:50) (16 - 19)  SpO2: 100% (08 Jun 2021 13:50) (95% - 100%)    PHYSICAL EXAM:  General: NAD, resting comfortably in bed  C/V: NSR  Pulm: Nonlabored breathing, no respiratory distress  Abd: soft, non-distended, mild lower abdominal tenderness, no rebound or guarding  CHUY: no masses or lesions internal or external, bright blood on glove  Extrem: WWP, bilateral LE edema,  Neuro: A/O x 3, CNs II-XII grossly intact, no focal deficits, normal sensation  Pulses: 2+ dp/pt

## 2021-06-08 NOTE — ED ADULT NURSE REASSESSMENT NOTE - NS ED NURSE REASSESS COMMENT FT1
While MD at bedside pt had an vasovagal episode. 2L of IVF infusing. 2nd IV line place to R arm.  Pt then with improved mental status and improved bp.

## 2021-06-08 NOTE — H&P ADULT - NSHPLABSRESULTS_GEN_ALL_CORE
LABS:                        6.8    8.72  )-----------( 132      ( 08 Jun 2021 13:34 )             21.7     06-08    143  |  108  |  27<H>  ----------------------------<  86  4.2   |  25  |  0.84    Ca    7.8<L>      08 Jun 2021 11:15    TPro  6.8  /  Alb  3.1<L>  /  TBili  0.4  /  DBili  x   /  AST  20  /  ALT  19  /  AlkPhos  63  06-08    PT/INR - ( 08 Jun 2021 11:22 )   PT: 13.7 sec;   INR: 1.15          PTT - ( 08 Jun 2021 11:22 )  PTT:25.7 sec      RADIOLOGY & ADDITIONAL STUDIES:  < from: CT Angio Abdomen and Pelvis w/ IV Cont (06.08.21 @ 14:25) >    IMPRESSION:  1.  Since September 15, 2020, new rectosigmoid fecal impaction. No evidence of active gastrointestinal bleeding this time.  2.  Unchanged right femoral hernia containing small bowel loops without obstruction or inflammation.  3.  New mild symmetric bilateral hydronephrosis with likely related to marked fluid distended bladder. Consider Baird decompression.  4.  Increased few patchy opacities in inferior lingula and left lower lobe, probably atelectasis, cannot exclude superimposed infection. Recommend clinical correlation.  5.  Unchanged additional chronic findings as detailed above.    < end of copied text >

## 2021-06-08 NOTE — PROCEDURE NOTE - ADDITIONAL PROCEDURE DETAILS
Patient prepped and draped. US guided placement of arterial line in R radial artery. Minimal blood loss. Single attempt.

## 2021-06-08 NOTE — ED PROVIDER NOTE - CHPI ED SYMPTOMS NEG
No chest pain, no shortness of breath, no lightheadedness, no syncope/no fever/no nausea/no vomiting/no chills

## 2021-06-09 ENCOUNTER — TRANSCRIPTION ENCOUNTER (OUTPATIENT)
Age: 86
End: 2021-06-09

## 2021-06-09 LAB
A1C WITH ESTIMATED AVERAGE GLUCOSE RESULT: 5.4 % — SIGNIFICANT CHANGE UP (ref 4–5.6)
ALBUMIN SERPL ELPH-MCNC: 2.3 G/DL — LOW (ref 3.3–5)
ANION GAP SERPL CALC-SCNC: 10 MMOL/L — SIGNIFICANT CHANGE UP (ref 5–17)
ANION GAP SERPL CALC-SCNC: 12 MMOL/L — SIGNIFICANT CHANGE UP (ref 5–17)
ANION GAP SERPL CALC-SCNC: 12 MMOL/L — SIGNIFICANT CHANGE UP (ref 5–17)
ANION GAP SERPL CALC-SCNC: 14 MMOL/L — SIGNIFICANT CHANGE UP (ref 5–17)
APPEARANCE UR: CLEAR — SIGNIFICANT CHANGE UP
BACTERIA # UR AUTO: PRESENT /HPF
BILIRUB UR-MCNC: NEGATIVE — SIGNIFICANT CHANGE UP
BUN SERPL-MCNC: 14 MG/DL — SIGNIFICANT CHANGE UP (ref 7–23)
BUN SERPL-MCNC: 16 MG/DL — SIGNIFICANT CHANGE UP (ref 7–23)
BUN SERPL-MCNC: 19 MG/DL — SIGNIFICANT CHANGE UP (ref 7–23)
BUN SERPL-MCNC: 21 MG/DL — SIGNIFICANT CHANGE UP (ref 7–23)
CALCIUM SERPL-MCNC: 6.5 MG/DL — CRITICAL LOW (ref 8.4–10.5)
CALCIUM SERPL-MCNC: 6.8 MG/DL — LOW (ref 8.4–10.5)
CALCIUM SERPL-MCNC: 7.2 MG/DL — LOW (ref 8.4–10.5)
CALCIUM SERPL-MCNC: 7.3 MG/DL — LOW (ref 8.4–10.5)
CHLORIDE SERPL-SCNC: 113 MMOL/L — HIGH (ref 96–108)
CHLORIDE SERPL-SCNC: 114 MMOL/L — HIGH (ref 96–108)
CHLORIDE SERPL-SCNC: 115 MMOL/L — HIGH (ref 96–108)
CHLORIDE SERPL-SCNC: 115 MMOL/L — HIGH (ref 96–108)
CO2 SERPL-SCNC: 17 MMOL/L — LOW (ref 22–31)
CO2 SERPL-SCNC: 18 MMOL/L — LOW (ref 22–31)
CO2 SERPL-SCNC: 22 MMOL/L — SIGNIFICANT CHANGE UP (ref 22–31)
CO2 SERPL-SCNC: 22 MMOL/L — SIGNIFICANT CHANGE UP (ref 22–31)
COLOR SPEC: YELLOW — SIGNIFICANT CHANGE UP
COVID-19 SPIKE DOMAIN AB INTERP: POSITIVE
COVID-19 SPIKE DOMAIN ANTIBODY RESULT: >250 U/ML — HIGH
CREAT SERPL-MCNC: 0.72 MG/DL — SIGNIFICANT CHANGE UP (ref 0.5–1.3)
CREAT SERPL-MCNC: 0.73 MG/DL — SIGNIFICANT CHANGE UP (ref 0.5–1.3)
CREAT SERPL-MCNC: 0.77 MG/DL — SIGNIFICANT CHANGE UP (ref 0.5–1.3)
CREAT SERPL-MCNC: 0.77 MG/DL — SIGNIFICANT CHANGE UP (ref 0.5–1.3)
DIFF PNL FLD: ABNORMAL
EPI CELLS # UR: SIGNIFICANT CHANGE UP /HPF (ref 0–5)
ESTIMATED AVERAGE GLUCOSE: 108 MG/DL — SIGNIFICANT CHANGE UP (ref 68–114)
GLUCOSE BLDC GLUCOMTR-MCNC: 144 MG/DL — HIGH (ref 70–99)
GLUCOSE BLDC GLUCOMTR-MCNC: 154 MG/DL — HIGH (ref 70–99)
GLUCOSE BLDC GLUCOMTR-MCNC: 154 MG/DL — HIGH (ref 70–99)
GLUCOSE BLDC GLUCOMTR-MCNC: 178 MG/DL — HIGH (ref 70–99)
GLUCOSE SERPL-MCNC: 128 MG/DL — HIGH (ref 70–99)
GLUCOSE SERPL-MCNC: 139 MG/DL — HIGH (ref 70–99)
GLUCOSE SERPL-MCNC: 158 MG/DL — HIGH (ref 70–99)
GLUCOSE SERPL-MCNC: 163 MG/DL — HIGH (ref 70–99)
GLUCOSE UR QL: NEGATIVE — SIGNIFICANT CHANGE UP
HCT VFR BLD CALC: 24.3 % — LOW (ref 34.5–45)
HCT VFR BLD CALC: 25.3 % — LOW (ref 34.5–45)
HCT VFR BLD CALC: 27.4 % — LOW (ref 34.5–45)
HCT VFR BLD CALC: 28.3 % — LOW (ref 34.5–45)
HCT VFR BLD CALC: 29.8 % — LOW (ref 34.5–45)
HGB BLD-MCNC: 10 G/DL — LOW (ref 11.5–15.5)
HGB BLD-MCNC: 8.4 G/DL — LOW (ref 11.5–15.5)
HGB BLD-MCNC: 8.6 G/DL — LOW (ref 11.5–15.5)
HGB BLD-MCNC: 9 G/DL — LOW (ref 11.5–15.5)
HGB BLD-MCNC: 9.6 G/DL — LOW (ref 11.5–15.5)
KETONES UR-MCNC: 40 MG/DL
LEUKOCYTE ESTERASE UR-ACNC: NEGATIVE — SIGNIFICANT CHANGE UP
MAGNESIUM SERPL-MCNC: 1.7 MG/DL — SIGNIFICANT CHANGE UP (ref 1.6–2.6)
MAGNESIUM SERPL-MCNC: 1.9 MG/DL — SIGNIFICANT CHANGE UP (ref 1.6–2.6)
MCHC RBC-ENTMCNC: 29.8 PG — SIGNIFICANT CHANGE UP (ref 27–34)
MCHC RBC-ENTMCNC: 30 PG — SIGNIFICANT CHANGE UP (ref 27–34)
MCHC RBC-ENTMCNC: 30.3 PG — SIGNIFICANT CHANGE UP (ref 27–34)
MCHC RBC-ENTMCNC: 30.5 PG — SIGNIFICANT CHANGE UP (ref 27–34)
MCHC RBC-ENTMCNC: 31 PG — SIGNIFICANT CHANGE UP (ref 27–34)
MCHC RBC-ENTMCNC: 32.8 GM/DL — SIGNIFICANT CHANGE UP (ref 32–36)
MCHC RBC-ENTMCNC: 33.6 GM/DL — SIGNIFICANT CHANGE UP (ref 32–36)
MCHC RBC-ENTMCNC: 33.9 GM/DL — SIGNIFICANT CHANGE UP (ref 32–36)
MCHC RBC-ENTMCNC: 34 GM/DL — SIGNIFICANT CHANGE UP (ref 32–36)
MCHC RBC-ENTMCNC: 34.6 GM/DL — SIGNIFICANT CHANGE UP (ref 32–36)
MCV RBC AUTO: 89.1 FL — SIGNIFICANT CHANGE UP (ref 80–100)
MCV RBC AUTO: 89.5 FL — SIGNIFICANT CHANGE UP (ref 80–100)
MCV RBC AUTO: 89.7 FL — SIGNIFICANT CHANGE UP (ref 80–100)
MCV RBC AUTO: 89.8 FL — SIGNIFICANT CHANGE UP (ref 80–100)
MCV RBC AUTO: 90.7 FL — SIGNIFICANT CHANGE UP (ref 80–100)
NITRITE UR-MCNC: NEGATIVE — SIGNIFICANT CHANGE UP
NRBC # BLD: 0 /100 WBCS — SIGNIFICANT CHANGE UP (ref 0–0)
PH UR: 6 — SIGNIFICANT CHANGE UP (ref 5–8)
PHOSPHATE SERPL-MCNC: 2.8 MG/DL — SIGNIFICANT CHANGE UP (ref 2.5–4.5)
PHOSPHATE SERPL-MCNC: 3.3 MG/DL — SIGNIFICANT CHANGE UP (ref 2.5–4.5)
PLATELET # BLD AUTO: 103 K/UL — LOW (ref 150–400)
PLATELET # BLD AUTO: 103 K/UL — LOW (ref 150–400)
PLATELET # BLD AUTO: 105 K/UL — LOW (ref 150–400)
PLATELET # BLD AUTO: 131 K/UL — LOW (ref 150–400)
PLATELET # BLD AUTO: 147 K/UL — LOW (ref 150–400)
POTASSIUM SERPL-MCNC: 2.6 MMOL/L — CRITICAL LOW (ref 3.5–5.3)
POTASSIUM SERPL-MCNC: 3.1 MMOL/L — LOW (ref 3.5–5.3)
POTASSIUM SERPL-MCNC: 3.2 MMOL/L — LOW (ref 3.5–5.3)
POTASSIUM SERPL-MCNC: 3.4 MMOL/L — LOW (ref 3.5–5.3)
POTASSIUM SERPL-SCNC: 2.6 MMOL/L — CRITICAL LOW (ref 3.5–5.3)
POTASSIUM SERPL-SCNC: 3.1 MMOL/L — LOW (ref 3.5–5.3)
POTASSIUM SERPL-SCNC: 3.2 MMOL/L — LOW (ref 3.5–5.3)
POTASSIUM SERPL-SCNC: 3.4 MMOL/L — LOW (ref 3.5–5.3)
PROT UR-MCNC: NEGATIVE MG/DL — SIGNIFICANT CHANGE UP
RBC # BLD: 2.71 M/UL — LOW (ref 3.8–5.2)
RBC # BLD: 2.84 M/UL — LOW (ref 3.8–5.2)
RBC # BLD: 3.02 M/UL — LOW (ref 3.8–5.2)
RBC # BLD: 3.15 M/UL — LOW (ref 3.8–5.2)
RBC # BLD: 3.33 M/UL — LOW (ref 3.8–5.2)
RBC # FLD: 14.8 % — HIGH (ref 10.3–14.5)
RBC # FLD: 14.9 % — HIGH (ref 10.3–14.5)
RBC # FLD: 14.9 % — HIGH (ref 10.3–14.5)
RBC # FLD: 15.2 % — HIGH (ref 10.3–14.5)
RBC # FLD: 15.7 % — HIGH (ref 10.3–14.5)
RBC CASTS # UR COMP ASSIST: ABNORMAL /HPF
SARS-COV-2 IGG+IGM SERPL QL IA: >250 U/ML — HIGH
SARS-COV-2 IGG+IGM SERPL QL IA: POSITIVE
SODIUM SERPL-SCNC: 144 MMOL/L — SIGNIFICANT CHANGE UP (ref 135–145)
SODIUM SERPL-SCNC: 145 MMOL/L — SIGNIFICANT CHANGE UP (ref 135–145)
SODIUM SERPL-SCNC: 146 MMOL/L — HIGH (ref 135–145)
SODIUM SERPL-SCNC: 149 MMOL/L — HIGH (ref 135–145)
SP GR SPEC: 1.01 — SIGNIFICANT CHANGE UP (ref 1–1.03)
UROBILINOGEN FLD QL: 0.2 E.U./DL — SIGNIFICANT CHANGE UP
WBC # BLD: 11.36 K/UL — HIGH (ref 3.8–10.5)
WBC # BLD: 12.23 K/UL — HIGH (ref 3.8–10.5)
WBC # BLD: 13.62 K/UL — HIGH (ref 3.8–10.5)
WBC # BLD: 13.86 K/UL — HIGH (ref 3.8–10.5)
WBC # BLD: 16.08 K/UL — HIGH (ref 3.8–10.5)
WBC # FLD AUTO: 11.36 K/UL — HIGH (ref 3.8–10.5)
WBC # FLD AUTO: 12.23 K/UL — HIGH (ref 3.8–10.5)
WBC # FLD AUTO: 13.62 K/UL — HIGH (ref 3.8–10.5)
WBC # FLD AUTO: 13.86 K/UL — HIGH (ref 3.8–10.5)
WBC # FLD AUTO: 16.08 K/UL — HIGH (ref 3.8–10.5)
WBC UR QL: ABNORMAL /HPF

## 2021-06-09 PROCEDURE — 99221 1ST HOSP IP/OBS SF/LOW 40: CPT | Mod: 25

## 2021-06-09 PROCEDURE — 93306 TTE W/DOPPLER COMPLETE: CPT | Mod: 26

## 2021-06-09 PROCEDURE — 99291 CRITICAL CARE FIRST HOUR: CPT

## 2021-06-09 RX ORDER — MAGNESIUM SULFATE 500 MG/ML
1 VIAL (ML) INJECTION ONCE
Refills: 0 | Status: COMPLETED | OUTPATIENT
Start: 2021-06-09 | End: 2021-06-09

## 2021-06-09 RX ORDER — FUROSEMIDE 40 MG
20 TABLET ORAL DAILY
Refills: 0 | Status: DISCONTINUED | OUTPATIENT
Start: 2021-06-09 | End: 2021-06-09

## 2021-06-09 RX ORDER — POTASSIUM CHLORIDE 20 MEQ
40 PACKET (EA) ORAL EVERY 4 HOURS
Refills: 0 | Status: COMPLETED | OUTPATIENT
Start: 2021-06-09 | End: 2021-06-09

## 2021-06-09 RX ORDER — POTASSIUM CHLORIDE 20 MEQ
20 PACKET (EA) ORAL ONCE
Refills: 0 | Status: COMPLETED | OUTPATIENT
Start: 2021-06-09 | End: 2021-06-09

## 2021-06-09 RX ORDER — POTASSIUM CHLORIDE 20 MEQ
30 PACKET (EA) ORAL
Refills: 0 | Status: DISCONTINUED | OUTPATIENT
Start: 2021-06-09 | End: 2021-06-09

## 2021-06-09 RX ORDER — POTASSIUM CHLORIDE 20 MEQ
10 PACKET (EA) ORAL
Refills: 0 | Status: DISCONTINUED | OUTPATIENT
Start: 2021-06-09 | End: 2021-06-09

## 2021-06-09 RX ORDER — CALCIUM GLUCONATE 100 MG/ML
2 VIAL (ML) INTRAVENOUS ONCE
Refills: 0 | Status: COMPLETED | OUTPATIENT
Start: 2021-06-09 | End: 2021-06-09

## 2021-06-09 RX ORDER — ONDANSETRON 8 MG/1
4 TABLET, FILM COATED ORAL ONCE
Refills: 0 | Status: COMPLETED | OUTPATIENT
Start: 2021-06-09 | End: 2021-06-09

## 2021-06-09 RX ORDER — POTASSIUM CHLORIDE 20 MEQ
60 PACKET (EA) ORAL ONCE
Refills: 0 | Status: DISCONTINUED | OUTPATIENT
Start: 2021-06-09 | End: 2021-06-09

## 2021-06-09 RX ORDER — POTASSIUM CHLORIDE 20 MEQ
20 PACKET (EA) ORAL
Refills: 0 | Status: COMPLETED | OUTPATIENT
Start: 2021-06-09 | End: 2021-06-09

## 2021-06-09 RX ORDER — ATORVASTATIN CALCIUM 80 MG/1
40 TABLET, FILM COATED ORAL AT BEDTIME
Refills: 0 | Status: DISCONTINUED | OUTPATIENT
Start: 2021-06-09 | End: 2021-06-13

## 2021-06-09 RX ORDER — POTASSIUM CHLORIDE 20 MEQ
40 PACKET (EA) ORAL ONCE
Refills: 0 | Status: DISCONTINUED | OUTPATIENT
Start: 2021-06-09 | End: 2021-06-09

## 2021-06-09 RX ORDER — FENTANYL CITRATE 50 UG/ML
100 INJECTION INTRAVENOUS ONCE
Refills: 0 | Status: DISCONTINUED | OUTPATIENT
Start: 2021-06-09 | End: 2021-06-09

## 2021-06-09 RX ORDER — MIDAZOLAM HYDROCHLORIDE 1 MG/ML
4 INJECTION, SOLUTION INTRAMUSCULAR; INTRAVENOUS ONCE
Refills: 0 | Status: DISCONTINUED | OUTPATIENT
Start: 2021-06-09 | End: 2021-06-09

## 2021-06-09 RX ADMIN — Medication 50 MILLIEQUIVALENT(S): at 09:22

## 2021-06-09 RX ADMIN — Medication 50 MILLIEQUIVALENT(S): at 14:56

## 2021-06-09 RX ADMIN — Medication 100 GRAM(S): at 05:26

## 2021-06-09 RX ADMIN — PANTOPRAZOLE SODIUM 40 MILLIGRAM(S): 20 TABLET, DELAYED RELEASE ORAL at 18:45

## 2021-06-09 RX ADMIN — Medication 100 MILLIGRAM(S): at 21:08

## 2021-06-09 RX ADMIN — Medication 50 MILLIEQUIVALENT(S): at 18:28

## 2021-06-09 RX ADMIN — Medication 70 MICROGRAM(S): at 05:26

## 2021-06-09 RX ADMIN — FENTANYL CITRATE 100 MICROGRAM(S): 50 INJECTION INTRAVENOUS at 15:05

## 2021-06-09 RX ADMIN — Medication 40 MILLIEQUIVALENT(S): at 18:45

## 2021-06-09 RX ADMIN — FENTANYL CITRATE 100 MICROGRAM(S): 50 INJECTION INTRAVENOUS at 16:00

## 2021-06-09 RX ADMIN — Medication 50 MILLIEQUIVALENT(S): at 11:39

## 2021-06-09 RX ADMIN — Medication 650 MILLIGRAM(S): at 00:00

## 2021-06-09 RX ADMIN — Medication 100 MILLIGRAM(S): at 13:49

## 2021-06-09 RX ADMIN — Medication 20 MILLIGRAM(S): at 10:31

## 2021-06-09 RX ADMIN — Medication 40 MILLIEQUIVALENT(S): at 21:08

## 2021-06-09 RX ADMIN — ONDANSETRON 4 MILLIGRAM(S): 8 TABLET, FILM COATED ORAL at 00:14

## 2021-06-09 RX ADMIN — Medication 3 MILLILITER(S): at 17:16

## 2021-06-09 RX ADMIN — Medication 40 MILLIEQUIVALENT(S): at 16:21

## 2021-06-09 RX ADMIN — PANTOPRAZOLE SODIUM 40 MILLIGRAM(S): 20 TABLET, DELAYED RELEASE ORAL at 05:26

## 2021-06-09 RX ADMIN — Medication 200 GRAM(S): at 06:35

## 2021-06-09 RX ADMIN — MIDAZOLAM HYDROCHLORIDE 4 MILLIGRAM(S): 1 INJECTION, SOLUTION INTRAMUSCULAR; INTRAVENOUS at 15:05

## 2021-06-09 RX ADMIN — Medication 30 MILLIEQUIVALENT(S): at 05:26

## 2021-06-09 RX ADMIN — Medication 3 MILLILITER(S): at 09:21

## 2021-06-09 RX ADMIN — Medication 100 MILLIGRAM(S): at 05:26

## 2021-06-09 RX ADMIN — Medication 3 MILLILITER(S): at 21:28

## 2021-06-09 RX ADMIN — Medication 1: at 23:00

## 2021-06-09 RX ADMIN — Medication 3 MILLILITER(S): at 05:26

## 2021-06-09 RX ADMIN — ATORVASTATIN CALCIUM 40 MILLIGRAM(S): 80 TABLET, FILM COATED ORAL at 21:08

## 2021-06-09 RX ADMIN — CHLORHEXIDINE GLUCONATE 1 APPLICATION(S): 213 SOLUTION TOPICAL at 06:35

## 2021-06-09 RX ADMIN — Medication 1: at 07:12

## 2021-06-09 RX ADMIN — Medication 100 GRAM(S): at 21:08

## 2021-06-09 RX ADMIN — Medication 1: at 11:39

## 2021-06-09 RX ADMIN — CHLORHEXIDINE GLUCONATE 1 APPLICATION(S): 213 SOLUTION TOPICAL at 07:12

## 2021-06-09 NOTE — CONSULT NOTE ADULT - SUBJECTIVE AND OBJECTIVE BOX
HPI:  89F PMH HTN, HLD, DM, hypothyroidism, CAD (cardiac cath), moderate AS, chronic diastolic CHF, Asthma/COPD (last hospitalization years ago, denies intubations), PMR (on MTX and Prednisone), osteoarthritis, h/o diverticulitis and LGIB (previously admission 9/2021 presents to [facility] w/ blood per rectum. Patient found to be anemic s/p 3 units of pRBC and IVF. Patient is currently chest pain free. No SOB. States she has been having chest discomfort for some time now (unable to clarify duration). She was supposed to have a stent placed in her RCA but never had it done last year due to COVID.     PAST MEDICAL & SURGICAL HISTORY:  HTN (hypertension)    COPD (chronic obstructive pulmonary disease)    Arthritis    Polymyalgia rheumatica    Hyperlipidemia    Chronic diastolic heart failure    Coronary artery disease    Diabetes    Moderate aortic stenosis    H/O bilateral oophorectomy    H/O total hysterectomy      PREVIOUS DIAGNOSTIC TESTING:      FAMILY HISTORY:      ALLERGIES/INTOLERANCES:  Fosamax (Angioedema)  lisinopril (Angioedema)  sulfa drugs (Hives)  tetanus immune globulin (Hives)    HOME MEDICATIONS:    INPATIENT MEDICATIONS:  furosemide   Injectable 20 milliGRAM(s) IV Push daily      acetaminophen    Suspension .. 650 milliGRAM(s) Oral every 6 hours PRN  ALBUTerol    90 MICROgram(s) HFA Inhaler 2 Puff(s) Inhalation every 6 hours PRN  albuterol/ipratropium for Nebulization 3 milliLiter(s) Nebulizer every 6 hours  chlorhexidine 2% Cloths 1 Application(s) Topical <User Schedule>  chlorhexidine 4% Liquid 1 Application(s) Topical <User Schedule>  dextrose 40% Gel 15 Gram(s) Oral once  dextrose 5%. 1000 milliLiter(s) IV Continuous <Continuous>  dextrose 5%. 1000 milliLiter(s) IV Continuous <Continuous>  dextrose 50% Injectable 25 Gram(s) IV Push once  dextrose 50% Injectable 12.5 Gram(s) IV Push once  glucagon  Injectable 1 milliGRAM(s) IntraMuscular once  hydrocortisone sodium succinate Injectable 100 milliGRAM(s) IV Push every 8 hours  insulin lispro (ADMELOG) corrective regimen sliding scale   SubCutaneous every 6 hours  levothyroxine Injectable 70 MICROGram(s) IV Push at bedtime  pantoprazole  Injectable 40 milliGRAM(s) IV Push two times a day  potassium chloride    Tablet ER 40 milliEquivalent(s) Oral once  potassium chloride  20 mEq/100 mL IVPB 20 milliEquivalent(s) IV Intermittent every 2 hours      REVIEW OF SYSTEMS: See HPI     PHYSICAL EXAM:  Gen: NAD   HEENT: EOMI   Cor: systolic murmur   Lungs: CTA b/l   Abd: soft, non-tender, non-distended  Ext: 1+ pitting edema up to ankle  Neuro: alert and attentive    T(C): 37.1 (06-09-21 @ 14:24), Max: 37.9 (06-08-21 @ 23:00)  HR: 93 (06-09-21 @ 13:15) (76 - 119)  BP: 125/67 (06-09-21 @ 13:15) (91/52 - 159/74)  RR: 16 (06-09-21 @ 13:15) (16 - 20)  SpO2: 98% (06-09-21 @ 13:15) (95% - 100%)  Wt(kg): --    I&O's Summary    08 Jun 2021 07:01  -  09 Jun 2021 07:00  --------------------------------------------------------  IN: 4350 mL / OUT: 3060 mL / NET: 1290 mL    09 Jun 2021 07:01  -  09 Jun 2021 14:37  --------------------------------------------------------  IN: 420 mL / OUT: 1505 mL / NET: -1085 mL      ECG:  	  	  LABS:                        8.6    12.23 )-----------( 147      ( 09 Jun 2021 14:16 )             25.3     06-09    146<H>  |  114<H>  |  19  ----------------------------<  163<H>  3.1<L>   |  18<L>  |  0.73    Ca    7.3<L>      09 Jun 2021 08:05  Phos  3.3     06-09  Mg     1.7     06-09    TPro  x   /  Alb  2.3<L>  /  TBili  x   /  DBili  x   /  AST  x   /  ALT  x   /  AlkPhos  x   06-09        89F PMH HTN, HLD, DM, hypothyroidism, CAD (cardiac cath), moderate AS, chronic diastolic CHF, Asthma/COPD (last hospitalization years ago, denies intubations), PMR (on MTX and Prednisone), osteoarthritis, h/o diverticulitis and LGIB (previously admission [facility]/2021 [facility]. Patient found to be anemic s/p 3 units of pRBC and IVF. Cardiology consulted for medical optimization.     #CAD   Patient has mid RCA disease that needs to be intervened. Plan was to have staged PCI in October but lost to follow up due to ?COVID?  - will need LHC once patient is stabilized from GI perspective   - start atorva 40  - can hold ASA and BB for now given GIB    #Pre-op  Patient is an intermediate risk for an intermediate risk procedure. RCRI 2 - 10.1% 30 day risk of death, MI or cardiac arrest   - medically optimized    #moderate AS   - be cautious with IVF as well as over diuresis, avoid vasodilators     #HFpEF  - received IV lasix 20 x 1 this AM   - strict I's and O's; be cautious with IVF   - holding home PO lasix 20 currently iso GIB    #HTN   - holding home amlodipine     Case discussed with cardiology attending.        HPI:  89F PMH HTN, HLD, DM, hypothyroidism, CAD (cardiac cath), moderate AS, chronic diastolic CHF, Asthma/COPD (last hospitalization years ago, denies intubations), PMR (on MTX and Prednisone), osteoarthritis, h/o diverticulitis and LGIB (previously admission 9/2021 presents to [facility] w/ blood per rectum. Patient found to be anemic s/p 3 units of pRBC and IVF. Patient is currently chest pain free. No SOB. States she has been having chest discomfort for some time now (unable to clarify duration). She was supposed to have a stent placed in her RCA but never had it done last year due to COVID.     PAST MEDICAL & SURGICAL HISTORY:  HTN (hypertension)    COPD (chronic obstructive pulmonary disease)    Arthritis    Polymyalgia rheumatica    Hyperlipidemia    Chronic diastolic heart failure    Coronary artery disease    Diabetes    Moderate aortic stenosis    H/O bilateral oophorectomy    H/O total hysterectomy      PREVIOUS DIAGNOSTIC TESTING:      FAMILY HISTORY:      ALLERGIES/INTOLERANCES:  Fosamax (Angioedema)  lisinopril (Angioedema)  sulfa drugs (Hives)  tetanus immune globulin (Hives)    HOME MEDICATIONS:    INPATIENT MEDICATIONS:  furosemide   Injectable 20 milliGRAM(s) IV Push daily      acetaminophen    Suspension .. 650 milliGRAM(s) Oral every 6 hours PRN  ALBUTerol    90 MICROgram(s) HFA Inhaler 2 Puff(s) Inhalation every 6 hours PRN  albuterol/ipratropium for Nebulization 3 milliLiter(s) Nebulizer every 6 hours  chlorhexidine 2% Cloths 1 Application(s) Topical <User Schedule>  chlorhexidine 4% Liquid 1 Application(s) Topical <User Schedule>  dextrose 40% Gel 15 Gram(s) Oral once  dextrose 5%. 1000 milliLiter(s) IV Continuous <Continuous>  dextrose 5%. 1000 milliLiter(s) IV Continuous <Continuous>  dextrose 50% Injectable 25 Gram(s) IV Push once  dextrose 50% Injectable 12.5 Gram(s) IV Push once  glucagon  Injectable 1 milliGRAM(s) IntraMuscular once  hydrocortisone sodium succinate Injectable 100 milliGRAM(s) IV Push every 8 hours  insulin lispro (ADMELOG) corrective regimen sliding scale   SubCutaneous every 6 hours  levothyroxine Injectable 70 MICROGram(s) IV Push at bedtime  pantoprazole  Injectable 40 milliGRAM(s) IV Push two times a day  potassium chloride    Tablet ER 40 milliEquivalent(s) Oral once  potassium chloride  20 mEq/100 mL IVPB 20 milliEquivalent(s) IV Intermittent every 2 hours      REVIEW OF SYSTEMS: See HPI     PHYSICAL EXAM:  Gen: NAD   HEENT: EOMI   Cor: systolic murmur   Lungs: CTA b/l   Abd: soft, non-tender, non-distended  Ext: 1+ pitting edema up to ankle  Neuro: alert and attentive    T(C): 37.1 (06-09-21 @ 14:24), Max: 37.9 (06-08-21 @ 23:00)  HR: 93 (06-09-21 @ 13:15) (76 - 119)  BP: 125/67 (06-09-21 @ 13:15) (91/52 - 159/74)  RR: 16 (06-09-21 @ 13:15) (16 - 20)  SpO2: 98% (06-09-21 @ 13:15) (95% - 100%)  Wt(kg): --    I&O's Summary    08 Jun 2021 07:01  -  09 Jun 2021 07:00  --------------------------------------------------------  IN: 4350 mL / OUT: 3060 mL / NET: 1290 mL    09 Jun 2021 07:01  -  09 Jun 2021 14:37  --------------------------------------------------------  IN: 420 mL / OUT: 1505 mL / NET: -1085 mL      ECG:  	atrial tach, NSST, PVC, poor R wave progression  	  LABS:                        8.6    12.23 )-----------( 147      ( 09 Jun 2021 14:16 )             25.3     06-09    146<H>  |  114<H>  |  19  ----------------------------<  163<H>  3.1<L>   |  18<L>  |  0.73    Ca    7.3<L>      09 Jun 2021 08:05  Phos  3.3     06-09  Mg     1.7     06-09    TPro  x   /  Alb  2.3<L>  /  TBili  x   /  DBili  x   /  AST  x   /  ALT  x   /  AlkPhos  x   06-09        89F PMH HTN, HLD, DM, hypothyroidism, CAD (cardiac cath), moderate AS, chronic diastolic CHF, Asthma/COPD (last hospitalization years ago, denies intubations), PMR (on MTX and Prednisone), osteoarthritis, h/o diverticulitis and LGIB (previously admission [facility]. Patient found to be anemic s/p 3 units of pRBC and IVF. Cardiology consulted for medical optimization.     #CAD   Patient has mid RCA disease that needs to be intervened. Plan was to have staged PCI in October but lost to follow up.   - will need C once patient is stabilized from GI perspective   - start atorva 40  - can hold ASA and BB for now given GIB    #Pre-op  Patient is an intermediate risk for an intermediate risk procedure. RCRI 2 - 10.1% 30 day risk of death, MI or cardiac arrest   - medically optimized    #moderate AS   - be cautious with IVF as well as over diuresis, avoid vasodilators     #HFpEF  - received IV lasix 20 x 1 this AM   - strict I's and O's; be cautious with IVF   - holding home PO lasix 20 currently iso GIB    #HTN   - holding home amlodipine     Case discussed with cardiology attending.

## 2021-06-09 NOTE — PROGRESS NOTE ADULT - ASSESSMENT
89F PMH HTN, HLD, DM, hypothyroidism, CAD (s/p CHETAN mRCA in remote [facility]), moderate AS, chronic diastolic CHF, Asthma/COPD (last hospitalization years ago, denies intubations), PMR (on MTX and Prednisone), osteoarthritis, h/o diverticulitis and LGIB (previously admission 9/2020). Pt presented to [facility] on 6/8 with 1 day Hx of bloody stools and hypotensive SBP 80s. This responded to IVF and she was transferred to [facility] SICU for hemodynamic monitoring in the setting of acute lower GI bleed. HD stable, AM Hgb 10.1 after unit. Planned for upper and lower endoscopy      NEURO: Pain controlled  CV: CAD, Echo 9/15-EF 64 %, mod Aortic stenosis holding BP meds  PULM: on RA, Duoneb albuterol PRN  GI/FEN: NPO/IVF, PPI BID, NTD from IR perspective, GI likely to cscope 6/9 Moviprep,   : Kidney stones & B/L hydronephrosis on CT with distended bladder: Cont Baird  ENDO: ISS, IV Synthroid, hydrocort 100q8  HEME: 3U pRBC. Serial CBCs.   PPx: Holding AC  LINES: PIV, A-line placed (6/8--)  WOUNDS: Warm compress to rt arm hematoma from IV infiltration of PRBC.   PT: not ordered

## 2021-06-09 NOTE — CHART NOTE - NSCHARTNOTEFT_GEN_A_CORE
Patient is s/p EGD+colonoscopy at bedside in the SICU w/ Dr. Liao w/ the following findings and recommendations.     EGD  Impressions:  - Normal esophagus.    - Normal duodenum.    - Erythema in the whole stomach compatible with non-erosive gastritis.    - A single polyp was found in the fundus, that appeared consistent with a GIST.      Colonoscopy   Impressions:  - Severe diverticulosis of the whole colon. No actively bleeding diverticula.     Recommendations:  - Clear Liquid Diet   - Protonix 40 mg po daily   - pending clinical course, will evaluate GIST either following resolution of acute GIB or as outpatient    Sadia Hameed MD  PGY-4, Gastroenterology Fellow  pager: [phone]

## 2021-06-09 NOTE — PROGRESS NOTE ADULT - SUBJECTIVE AND OBJECTIVE BOX
INTERVAL HPI/OVERNIGHT EVENTS: ON: Hgb: 9.4- @8pm.Pt Increased Tachycardia @11pm with c/o mild SOB- POCUS + Blines, CXR:+Fluid overload--125/hr overnight, Multiple dark stools, CBC: repeat 9.5 Temp 100.3-given Tylenol, UA: wbc 5-10. Bld cx sent. CBC repeated again @1am as per Dr Armijo: 9.0 BP dropped to 90's systolic while pt sleeping with improvement of pulse to 90's -> Ordered 1UPRBC. IV failed in rt arm and part of blood infused there, repeat Hgb 9.6 - f/u repeat @8am. Pt drank half of prep but felt Nauseous therefore stopped and given Zofran, restarted again @4am. AM labs repleted. Spoke with Pt: Full code  : admitted with GIB, clots and BRBPR x3, CTA neg, IR consult no intervention  neg. CTA, [place] placed, GI consulted (Keshawn) will likely scope Wednesday, repeat H/H 6.8 from 9.8 at [facility], 2 U pRBC given total CBC pending 8PM    SUBJECTIVE: Patient seen and examined at bedside with chief. Pain significantly improved. Resumed taking bowel prep, continued dark BMs. Pt denies CP, SOB, fevers, chills nausea, vomiting.     MEDICATIONS  (PRN):  acetaminophen    Suspension .. 650 milliGRAM(s) Oral every 6 hours PRN Temp greater or equal to 38C (100.4F), Moderate Pain (4 - 6)  ALBUTerol    90 MICROgram(s) HFA Inhaler 2 Puff(s) Inhalation every 6 hours PRN Shortness of Breath      I&O's Detail    2021 07:01  -  2021 07:00  --------------------------------------------------------  IN:    IV PiggyBack: 200 mL    Oral Fluid: 1250 mL    PRBCs (Packed Red Blood Cells): 900 mL    Sodium Chloride 0.9% Bolus: 2000 mL  Total IN: 4350 mL    OUT:    Intermittent Catheterization - Urethral (mL): 3060 mL  Total OUT: 3060 mL    Total NET: 1290 mL      2021 07:01  -  2021 16:25  --------------------------------------------------------  IN:    IV PiggyBack: 300 mL    Platelets - Single Donor: 220 mL  Total IN: 520 mL    OUT:    Indwelling Catheter - Urethral (mL): 1555 mL    Intermittent Catheterization - Urethral (mL): 75 mL  Total OUT: 1630 mL    Total NET: -1110 mL          T(C): 37.1 (21 @ 14:24), Max: 37.9 (21 @ 23:00)  HR: 96 (21 @ 15:00) (81 - 119)  BP: 135/63 (21 @ 15:00) (91/52 - 159/74)  RR: 15 (21 @ 15:00) (15 - 20)  SpO2: 97% (21 @ 15:00) (95% - 100%)    GENERAL: NAD, Resting comfortably in bed, awake, opens eyes spontaneously  HEENT: NCAT, MMM  RESP: Nonlabored breathing, No respiratory distress  CARD: Normal rate, Normal peripheral perfusion  GI: Soft, ND, NT, No guarding, No rebound tenderness  EXTREM: WWP, BLE edema No gross deformity of extremities  SKIN: No rashes, no lesions  NEURO: AAOx3, No focal motor or sensory deficits  PSYCH: Affect and characteristics of appearance, verbalizations, and behaviors are appropriate    LABS:                        8.6    12.23 )-----------( 147      ( 2021 14:16 )             25.3     06-    149<H>  |  115<H>  |  16  ----------------------------<  128<H>  2.6<LL>   |  22  |  0.77    Ca    7.2<L>      2021 14:16  Phos  3.3     06-  Mg     1.7     -    TPro  x   /  Alb  2.3<L>  /  TBili  x   /  DBili  x   /  AST  x   /  ALT  x   /  AlkPhos  x       PT/INR - ( 2021 11:22 )   PT: 13.7 sec;   INR: 1.15          PTT - ( 2021 11:22 )  PTT:25.7 sec  Urinalysis Basic - ( 2021 23:24 )    Color: Yellow / Appearance: Clear / S.010 / pH: x  Gluc: x / Ketone: 40 mg/dL  / Bili: Negative / Urobili: 0.2 E.U./dL   Blood: x / Protein: NEGATIVE mg/dL / Nitrite: NEGATIVE   Leuk Esterase: NEGATIVE / RBC: 5-10 /HPF / WBC 5-10 /HPF   Sq Epi: x / Non Sq Epi: 0-5 /HPF / Bacteria: Present /HPF        RADIOLOGY & ADDITIONAL STUDIES:      Culture - Blood (collected 21 @ 00:19)  Source: .Blood Blood  Preliminary Report (21 @ 13:00):    No growth at 12 hours

## 2021-06-09 NOTE — PROGRESS NOTE ADULT - ASSESSMENT
89F PMH HTN, HLD, DM, hypothyroidism, CAD (s/p CHETAN mRCA in remote [facility]), moderate AS, chronic diastolic CHF, Asthma/COPD (last hospitalization years ago, denies intubations), PMR (on MTX and Prednisone), osteoarthritis, h/o diverticulitis and LGIB (previously admission 9/2020). Pt presented to [facility] on 6/8 with 1 day Hx of bloody stools and hypotensive SBP 80s. This responded to IVF and she was transferred to [facility] SICU for hemodynamic monitoring in the setting of acute lower GI bleed. HD stable, AM Hgb 10.1 after unit. Planned for upper and lower endoscopy      NEURO: Pain controlled  CV: CAD, Echo 9/15-EF 64 %, mod Aortic stenosis holding BP meds  PULM: on RA, Duoneb albuterol PRN  GI/FEN: NPO/IVF, PPI BID, NTD from IR perspective, GI likely to cscope 6/9 Moviprep,   : Kidney stones & B/L hydronephrosis on CT with distended bladder: Cont Baird  ENDO: ISS, IV Synthroid, hydrocort 100q8  HEME: 3U pRBC. Serial CBCs.   PPx: Holding AC  LINES: PIV, A-line placed (6/8--)  WOUNDS: Warm compress to rt arm hematoma from IV infiltration of PRBC.   PT: not ordered

## 2021-06-09 NOTE — CONSULT NOTE ADULT - ATTENDING COMMENTS
Pt was seen and examined. Agree with the above. Will do colonoscopy and EGD today
Initial attending contact date  6/10/21    . See fellow note written above for details. I reviewed the fellow documentation. I have personally seen and examined this patient. I reviewed vitals, labs, medications, cardiac studies, and additional imaging. I agree with the above fellow's findings and plans as written above with the following additions/statements.    89F PMH HTN, HLD, DM, hypothyroidism, CAD (cardiac cath), moderate AS, chronic diastolic CHF, Asthma/COPD (last hospitalization years ago, denies intubations), PMR (on MTX and Prednisone), osteoarthritis, h/o diverticulitis and LGIB (previously admission 9/2021 [facility]. Patient found to be anemic s/p 3 units of pRBC and IVF. Cardiology consulted for medical optimization.     #CAD   Patient has mid RCA disease that needs to be intervened. Plan was to have staged PCI in October but lost to follow up.   - will need C once patient is stabilized from GI perspective, however not on this admission  - start atorva 40  - can hold ASA and BB for now given GIB    #Pre-op  Patient is an intermediate risk for an intermediate risk procedure. RCRI 2 - 10.1% 30 day risk of death, MI or cardiac arrest   - medically optimized    #moderate AS   - be cautious with IVF as well as over diuresis, avoid vasodilators     #HFpEF  - received IV lasix 20 x 1 this AM   - strict I's and O's; be cautious with IVF   - holding home PO lasix 20 currently iso GIB    #HTN   - holding home amlodipine

## 2021-06-09 NOTE — PROGRESS NOTE ADULT - ATTENDING COMMENTS
Active GI bleeding appears to stop.  Had some blood per rectum with bowel prep.  Scheduled for colonoscopy today.  Continue current care.  Will follow.

## 2021-06-09 NOTE — PROGRESS NOTE ADULT - SUBJECTIVE AND OBJECTIVE BOX
INTERVAL HPI/OVERNIGHT EVENTS: ON: Hgb: 9.4- @8pm.Pt Increased Tachycardia @11pm with c/o mild SOB- POCUS + Blines, CXR:+Fluid overload--125/hr overnight, Multiple dark stools, CBC: repeat 9.5 Temp 100.3-given Tylenol, UA: wbc 5-10. Bld cx sent. CBC repeated again @1am as per Dr Armijo: 9.0 BP dropped to 90's systolic while pt sleeping with improvement of pulse to 90's -> Ordered 1UPRBC. IV failed in rt arm and part of blood infused there, repeat Hgb 9.6 - f/u repeat @8am. Pt drank half of prep but felt Nauseous therefore stopped and given Zofran, restarted again @4am. AM labs repleted. Spoke with Pt: Full code  6/8: admitted with GIB, clots and BRBPR x3, CTA neg, IR consult no intervention 2/2 neg. CTA, [place] placed, GI consulted (Keshawn) will likely scope Wednesday, repeat H/H 6.8 from 9.8 at [facility], 2 U pRBC given total CBC pending 8PM    Examined at the bedside this am. States pain much improved from yesterday. No nausea or emesis this am with prep. Continues to have dark BM. Denies fevers, chills, cp, sob. No acute complaints.        PRESSORS: [ ] YES [ ] NO  WHICH:  DOSE:    ANTIBIOTICS:                  DATE STARTED:  ANTIBIOTICS:                  DATE STARTED:  ANTIBIOTICS:                  DATE STARTED:    CENTRAL LINE: [ ] YES [ ] NO  LOCATION:   DATE INSERTED:  REMOVE: [ ] YES [ ] NO  EXPLAIN:    [place]: [ ] YES [ ] NO    DATE INSERTED:  REMOVE: [ ] YES [ ] NO  EXPLAIN:    A-LINE: [ ] YES [ ] NO  LOCATION:   DATE INSERTED:  REMOVE: [ ] YES [ ] NO  EXPLAIN:    ICU Vital Signs Last 24 Hrs  T(C): 36.8 (09 Jun 2021 05:32), Max: 37.9 (08 Jun 2021 23:00)  T(F): 98.3 (09 Jun 2021 05:32), Max: 100.3 (08 Jun 2021 23:00)  HR: 89 (09 Jun 2021 09:00) (60 - 119)  BP: 147/74 (09 Jun 2021 09:00) (55/40 - 147/74)  BP(mean): 104 (09 Jun 2021 09:00) (48 - 104)  ABP: 138/58 (09 Jun 2021 09:00) (91/36 - 146/77)  ABP(mean): 92 (09 Jun 2021 09:00) (55 - 106)  RR: 17 (09 Jun 2021 09:00) (16 - 20)  SpO2: 100% (09 Jun 2021 09:00) (95% - 100%)            08 Jun 2021 07:01  -  09 Jun 2021 07:00  --------------------------------------------------------  IN:    IV PiggyBack: 200 mL    Oral Fluid: 1250 mL    PRBCs (Packed Red Blood Cells): 900 mL    Sodium Chloride 0.9% Bolus: 2000 mL  Total IN: 4350 mL    OUT:    Intermittent Catheterization - Urethral (mL): 3060 mL  Total OUT: 3060 mL    Total NET: 1290 mL      09 Jun 2021 07:01  -  09 Jun 2021 09:57  --------------------------------------------------------  IN:  Total IN: 0 mL    OUT:    Indwelling Catheter - Urethral (mL): 45 mL    Intermittent Catheterization - Urethral (mL): 75 mL  Total OUT: 120 mL    Total NET: -120 mL    PHYSICAL EXAM:  General: NAD, resting comfortably in bed  C/V: NSR, Aortic and tricuspid murmur, no rubs or gallops  Pulm: Nonlabored breathing, no respiratory distress, On RA, crackles at the lung bases, no wheezes or rhonchi  Abd: soft, non-distended, non tender, no rebound or guarding  Extrem: WWP, edema improved  Neuro: A/O x 3, CNs II-XII grossly intact, no focal deficits, normal sensation  Pulses: 2+ dp/pt    LABS:                        10.0   13.62 )-----------( 105      ( 09 Jun 2021 08:05 )             29.8     06-09    146<H>  |  114<H>  |  19  ----------------------------<  163<H>  3.1<L>   |  18<L>  |  0.73    Ca    7.3<L>      09 Jun 2021 08:05  Phos  3.3     06-09  Mg     1.7     06-09    TPro  x   /  Alb  2.3<L>  /  TBili  x   /  DBili  x   /  AST  x   /  ALT  x   /  AlkPhos  x   06-09      RADIOLOGY & ADDITIONAL STUDIES:  < from: CT Angio Abdomen and Pelvis w/ IV Cont (06.08.21 @ 14:25) >  IMPRESSION:  1.  Since September 15, 2020, new rectosigmoid fecal impaction. No evidence of active gastrointestinal bleeding this time.  2.  Unchanged right femoral hernia containing small bowel loops without obstruction or inflammation.  3.  New mild symmetric bilateral hydronephrosis with likely related to marked fluid distended bladder. Consider [place] decompression.  4.  Increased few patchy opacities in inferior lingula and left lower lobe, probably atelectasis, cannot exclude superimposed infection. Recommend clinical correlation.  5.  Unchanged additional chronic findings as detailed above.    < end of copied text >

## 2021-06-10 LAB
ANION GAP SERPL CALC-SCNC: 5 MMOL/L — SIGNIFICANT CHANGE UP (ref 5–17)
ANION GAP SERPL CALC-SCNC: 9 MMOL/L — SIGNIFICANT CHANGE UP (ref 5–17)
BUN SERPL-MCNC: 13 MG/DL — SIGNIFICANT CHANGE UP (ref 7–23)
BUN SERPL-MCNC: 14 MG/DL — SIGNIFICANT CHANGE UP (ref 7–23)
CALCIUM SERPL-MCNC: 6.7 MG/DL — LOW (ref 8.4–10.5)
CALCIUM SERPL-MCNC: 7.1 MG/DL — LOW (ref 8.4–10.5)
CHLORIDE SERPL-SCNC: 117 MMOL/L — HIGH (ref 96–108)
CHLORIDE SERPL-SCNC: 118 MMOL/L — HIGH (ref 96–108)
CO2 SERPL-SCNC: 20 MMOL/L — LOW (ref 22–31)
CO2 SERPL-SCNC: 23 MMOL/L — SIGNIFICANT CHANGE UP (ref 22–31)
CREAT SERPL-MCNC: 0.74 MG/DL — SIGNIFICANT CHANGE UP (ref 0.5–1.3)
CREAT SERPL-MCNC: 0.75 MG/DL — SIGNIFICANT CHANGE UP (ref 0.5–1.3)
GLUCOSE BLDC GLUCOMTR-MCNC: 129 MG/DL — HIGH (ref 70–99)
GLUCOSE BLDC GLUCOMTR-MCNC: 132 MG/DL — HIGH (ref 70–99)
GLUCOSE BLDC GLUCOMTR-MCNC: 159 MG/DL — HIGH (ref 70–99)
GLUCOSE BLDC GLUCOMTR-MCNC: 180 MG/DL — HIGH (ref 70–99)
GLUCOSE SERPL-MCNC: 134 MG/DL — HIGH (ref 70–99)
GLUCOSE SERPL-MCNC: 150 MG/DL — HIGH (ref 70–99)
HCT VFR BLD CALC: 24.8 % — LOW (ref 34.5–45)
HCT VFR BLD CALC: 26.4 % — LOW (ref 34.5–45)
HCT VFR BLD CALC: 26.4 % — LOW (ref 34.5–45)
HGB BLD-MCNC: 8.5 G/DL — LOW (ref 11.5–15.5)
HGB BLD-MCNC: 8.7 G/DL — LOW (ref 11.5–15.5)
HGB BLD-MCNC: 8.9 G/DL — LOW (ref 11.5–15.5)
MAGNESIUM SERPL-MCNC: 2.2 MG/DL — SIGNIFICANT CHANGE UP (ref 1.6–2.6)
MCHC RBC-ENTMCNC: 29.6 PG — SIGNIFICANT CHANGE UP (ref 27–34)
MCHC RBC-ENTMCNC: 30.7 PG — SIGNIFICANT CHANGE UP (ref 27–34)
MCHC RBC-ENTMCNC: 30.7 PG — SIGNIFICANT CHANGE UP (ref 27–34)
MCHC RBC-ENTMCNC: 33 GM/DL — SIGNIFICANT CHANGE UP (ref 32–36)
MCHC RBC-ENTMCNC: 33.7 GM/DL — SIGNIFICANT CHANGE UP (ref 32–36)
MCHC RBC-ENTMCNC: 34.3 GM/DL — SIGNIFICANT CHANGE UP (ref 32–36)
MCV RBC AUTO: 89.5 FL — SIGNIFICANT CHANGE UP (ref 80–100)
MCV RBC AUTO: 89.8 FL — SIGNIFICANT CHANGE UP (ref 80–100)
MCV RBC AUTO: 91 FL — SIGNIFICANT CHANGE UP (ref 80–100)
NRBC # BLD: 0 /100 WBCS — SIGNIFICANT CHANGE UP (ref 0–0)
PHOSPHATE SERPL-MCNC: 1.7 MG/DL — LOW (ref 2.5–4.5)
PHOSPHATE SERPL-MCNC: 2.3 MG/DL — LOW (ref 2.5–4.5)
PLATELET # BLD AUTO: 129 K/UL — LOW (ref 150–400)
PLATELET # BLD AUTO: 139 K/UL — LOW (ref 150–400)
PLATELET # BLD AUTO: 155 K/UL — SIGNIFICANT CHANGE UP (ref 150–400)
POTASSIUM SERPL-MCNC: 4.2 MMOL/L — SIGNIFICANT CHANGE UP (ref 3.5–5.3)
POTASSIUM SERPL-MCNC: 4.3 MMOL/L — SIGNIFICANT CHANGE UP (ref 3.5–5.3)
POTASSIUM SERPL-SCNC: 4.2 MMOL/L — SIGNIFICANT CHANGE UP (ref 3.5–5.3)
POTASSIUM SERPL-SCNC: 4.3 MMOL/L — SIGNIFICANT CHANGE UP (ref 3.5–5.3)
RBC # BLD: 2.77 M/UL — LOW (ref 3.8–5.2)
RBC # BLD: 2.9 M/UL — LOW (ref 3.8–5.2)
RBC # BLD: 2.94 M/UL — LOW (ref 3.8–5.2)
RBC # FLD: 15 % — HIGH (ref 10.3–14.5)
RBC # FLD: 15.3 % — HIGH (ref 10.3–14.5)
RBC # FLD: 15.4 % — HIGH (ref 10.3–14.5)
SODIUM SERPL-SCNC: 145 MMOL/L — SIGNIFICANT CHANGE UP (ref 135–145)
SODIUM SERPL-SCNC: 147 MMOL/L — HIGH (ref 135–145)
WBC # BLD: 12.91 K/UL — HIGH (ref 3.8–10.5)
WBC # BLD: 13.64 K/UL — HIGH (ref 3.8–10.5)
WBC # BLD: 14.89 K/UL — HIGH (ref 3.8–10.5)
WBC # FLD AUTO: 12.91 K/UL — HIGH (ref 3.8–10.5)
WBC # FLD AUTO: 13.64 K/UL — HIGH (ref 3.8–10.5)
WBC # FLD AUTO: 14.89 K/UL — HIGH (ref 3.8–10.5)

## 2021-06-10 PROCEDURE — 99233 SBSQ HOSP IP/OBS HIGH 50: CPT

## 2021-06-10 PROCEDURE — 99233 SBSQ HOSP IP/OBS HIGH 50: CPT | Mod: GC

## 2021-06-10 RX ORDER — LEVOTHYROXINE SODIUM 125 MCG
88 TABLET ORAL DAILY
Refills: 0 | Status: DISCONTINUED | OUTPATIENT
Start: 2021-06-11 | End: 2021-06-13

## 2021-06-10 RX ADMIN — Medication 70 MICROGRAM(S): at 06:30

## 2021-06-10 RX ADMIN — PANTOPRAZOLE SODIUM 40 MILLIGRAM(S): 20 TABLET, DELAYED RELEASE ORAL at 18:04

## 2021-06-10 RX ADMIN — Medication 1: at 11:53

## 2021-06-10 RX ADMIN — Medication 3 MILLILITER(S): at 05:07

## 2021-06-10 RX ADMIN — ATORVASTATIN CALCIUM 40 MILLIGRAM(S): 80 TABLET, FILM COATED ORAL at 21:16

## 2021-06-10 RX ADMIN — Medication 3 MILLILITER(S): at 16:18

## 2021-06-10 RX ADMIN — Medication 85 MILLIMOLE(S): at 08:38

## 2021-06-10 RX ADMIN — CHLORHEXIDINE GLUCONATE 1 APPLICATION(S): 213 SOLUTION TOPICAL at 06:31

## 2021-06-10 RX ADMIN — PANTOPRAZOLE SODIUM 40 MILLIGRAM(S): 20 TABLET, DELAYED RELEASE ORAL at 06:30

## 2021-06-10 RX ADMIN — Medication 1: at 18:04

## 2021-06-10 RX ADMIN — Medication 100 MILLIGRAM(S): at 06:30

## 2021-06-10 RX ADMIN — Medication 3 MILLILITER(S): at 21:16

## 2021-06-10 NOTE — PROGRESS NOTE ADULT - SUBJECTIVE AND OBJECTIVE BOX
INTERVAL HPI/OVERNIGHT EVENTS:    PRESSORS: [ ] YES [ ] NO  WHICH:  DOSE:    ANTIBIOTICS:                  DATE STARTED:  ANTIBIOTICS:                  DATE STARTED:  ANTIBIOTICS:                  DATE STARTED:    CENTRAL LINE: [ ] YES [ ] NO  LOCATION:   DATE INSERTED:  REMOVE: [ ] YES [ ] NO  EXPLAIN:    DEL VALLE: [ ] YES [ ] NO    DATE INSERTED:  REMOVE: [ ] YES [ ] NO  EXPLAIN:    A-LINE: [ ] YES [ ] NO  LOCATION:   DATE INSERTED:  REMOVE: [ ] YES [ ] NO  EXPLAIN:    ICU Vital Signs Last 24 Hrs  T(C): 37.1 (10 Elvin 2021 05:40), Max: 37.2 (09 Jun 2021 11:25)  T(F): 98.8 (10 Elvin 2021 05:40), Max: 99 (09 Jun 2021 17:25)  HR: 92 (10 Elvin 2021 07:00) (83 - 113)  BP: 140/65 (10 Elvin 2021 07:00) (103/57 - 159/74)  BP(mean): 94 (10 Elvin 2021 07:00) (76 - 107)  ABP: 137/55 (10 Elvin 2021 07:00) (104/44 - 154/75)  ABP(mean): 92 (10 Elvin 2021 07:00) (68 - 105)  RR: 16 (10 Elvin 2021 07:00) (14 - 18)  SpO2: 95% (10 Elvin 2021 07:00) (92% - 100%)            09 Jun 2021 07:01  -  10 Elvin 2021 07:00  --------------------------------------------------------  IN:    IV PiggyBack: 550 mL    Platelets - Single Donor: 220 mL  Total IN: 770 mL    OUT:    Indwelling Catheter - Urethral (mL): 2135 mL    Intermittent Catheterization - Urethral (mL): 75 mL  Total OUT: 2210 mL    Total NET: -1440 mL          PHYSICAL EXAM:    LABS:                        8.7    13.64 )-----------( 139      ( 10 Elvin 2021 05:35 )             26.4     06-10    145  |  117<H>  |  13  ----------------------------<  134<H>  4.3   |  23  |  0.74    Ca    7.1<L>      10 Elvin 2021 05:35  Phos  1.7     06-10  Mg     2.2     06-10    TPro  x   /  Alb  2.3<L>  /  TBili  x   /  DBili  x   /  AST  x   /  ALT  x   /  AlkPhos  x   06-09      RADIOLOGY & ADDITIONAL STUDIES: INTERVAL HPI/OVERNIGHT EVENTS: ON: Repeat Hgb 8.4 - @7- repeat again @MN 8.5 f/u repeat in am.  K 3.4 @8pm - still due for 60meq so no additional K ordered. Repeat BMP @MN 4.2 - No changes. Uo dec to 25-30 in am however pt 58kg  pt hd stable pulse in 80's f/u Am Hgb ( 8.7).   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 Echo: hyperdynamic LV, EF> 75%, moderate AS valve area 1.41, no PH or other valve disease, Cards c/s - considering LHC, Hgb @ 2pm 8.6, K 3.1 >> 2.6 at 2pm, repleted.     Examined at the bedside resting comfortably in bed. States she's doing well with mild neck pain. Passing flatus, no BM overnight. Denies fevers, chills, nausea, emesis. No acute complaints.                                PRESSORS: [ ] YES [ ] NO  WHICH:  DOSE:    ANTIBIOTICS:                  DATE STARTED:  ANTIBIOTICS:                  DATE STARTED:  ANTIBIOTICS:                  DATE STARTED:    CENTRAL LINE: [ ] YES [ ] NO  LOCATION:   DATE INSERTED:  REMOVE: [ ] YES [ ] NO  EXPLAIN:    DEL VALLE: [ ] YES [ ] NO    DATE INSERTED:  REMOVE: [ ] YES [ ] NO  EXPLAIN:    A-LINE: [ ] YES [ ] NO  LOCATION:   DATE INSERTED:  REMOVE: [ ] YES [ ] NO  EXPLAIN:    ICU Vital Signs Last 24 Hrs  T(C): 37.1 (10 Elvin 2021 05:40), Max: 37.2 (09 Jun 2021 11:25)  T(F): 98.8 (10 Elvin 2021 05:40), Max: 99 (09 Jun 2021 17:25)  HR: 92 (10 Elvin 2021 07:00) (83 - 113)  BP: 140/65 (10 Elvin 2021 07:00) (103/57 - 159/74)  BP(mean): 94 (10 Elvin 2021 07:00) (76 - 107)  ABP: 137/55 (10 Elvin 2021 07:00) (104/44 - 154/75)  ABP(mean): 92 (10 Elvin 2021 07:00) (68 - 105)  RR: 16 (10 Elvin 2021 07:00) (14 - 18)  SpO2: 95% (10 Elvin 2021 07:00) (92% - 100%)            09 Jun 2021 07:01  -  10 Elvin 2021 07:00  --------------------------------------------------------  IN:    IV PiggyBack: 550 mL    Platelets - Single Donor: 220 mL  Total IN: 770 mL    OUT:    Indwelling Catheter - Urethral (mL): 2135 mL    Intermittent Catheterization - Urethral (mL): 75 mL  Total OUT: 2210 mL    Total NET: -1440 mL      PHYSICAL EXAM:  General: NAD, resting comfortably in bed  C/V: NSR, Aortic and tricuspid murmur, no rubs or gallops  Pulm: Nonlabored breathing, no respiratory distress, On RA, crackles at the lung bases, no wheezes or rhonchi  Abd: soft, non-distended, mild lower abdominal tenderness, no rebound or guarding  Extrem: WWP, edema improved  Neuro: A/O x 3, CNs II-XII grossly intact, no focal deficits, normal sensation  Pulses: 2+ dp/pt    LABS:                        8.7    13.64 )-----------( 139      ( 10 Elvin 2021 05:35 )             26.4     06-10    145  |  117<H>  |  13  ----------------------------<  134<H>  4.3   |  23  |  0.74    Ca    7.1<L>      10 Elvin 2021 05:35  Phos  1.7     06-10  Mg     2.2     06-10    TPro  x   /  Alb  2.3<L>  /  TBili  x   /  DBili  x   /  AST  x   /  ALT  x   /  AlkPhos  x   06-09      RADIOLOGY & ADDITIONAL STUDIES:

## 2021-06-10 NOTE — PROGRESS NOTE ADULT - ASSESSMENT
89F PMH HTN, HLD, DM, hypothyroidism, CAD (s/p CHETAN mRCA in remote [facility]), moderate AS, chronic diastolic CHF, Asthma/COPD (last hospitalization years ago, denies intubations), PMR (on MTX and Prednisone), osteoarthritis, h/o diverticulitis and LGIB (previously admission 9/2020). Pt presented to [facility] on 6/8 with 1 day Hx of bloody stools and hypotensive SBP 80s, responsive to IVF and she was transferred to [facility] SICU for hemodynamic monitoring in the setting of acute lower GI bleed. Now s/p 3units PRBCs, EGD/C-scope showing GIST and extensive diverticulosis without active bleed. Hgb stable x3, clinically improving. Will advance diet    NEURO: Pain controlled  CV: CAD, Echo 9/15-EF 64 %, mod Aortic stenosis holding BP meds  PULM: on RA, Duoneb albuterol PRN  GI/FEN: FLD DASH/TLC CC, PPI BID, NTD from IR perspective, EGD (6/9): polypoid mass/?GIST, C-scope (6/9): diverticulosis   : Kidney stones & B/L hydronephrosis on CT with distended bladder: Cont Baird  ENDO: ISS, IV Synthroid, prednisone 7mg QD   HEME: 3U pRBC. 1U Platelets, Serial CBCs q8  PPx: Holding AC  LINES: PIV, A-line placed (6/8--)  WOUNDS: Warm compress to rt arm hematoma from IV infiltration of PRBC.   PT: not ordered

## 2021-06-10 NOTE — DIETITIAN INITIAL EVALUATION ADULT. - OTHER CALCULATIONS
IBW used for calculations as pt >120% of IBW (129%). Needs adjusted for advanced age and suspected malnutrition.

## 2021-06-10 NOTE — PROGRESS NOTE ADULT - ASSESSMENT
89F PMH HTN, HLD, DM, hypothyroidism, CAD (cardiac cath), moderate AS, chronic diastolic CHF, Asthma/COPD (last hospitalization years ago, denies intubations), PMR (on MTX and Prednisone), osteoarthritis, h/o diverticulitis and LGIB (previously admission [facility]. Patient found to be anemic s/p 3 units of pRBC and IVF. Cardiology consulted for medical optimization.     #CAD   Patient has mid RCA disease that needs to be intervened. Plan was to have staged PCI in October but lost to follow up.   - will need C once patient is stabilized from GI perspective   - start atorva 40  - start ASA when safe from bleeding perspective   - start metop succinate 12.5mg xl     #Pre-op  Patient is an intermediate risk for an intermediate risk procedure. RCRI 2 - 10.1% 30 day risk of death, MI or cardiac arrest   - medically optimized    #moderate AS   - be cautious with IVF as well as over diuresis, avoid vasodilators     #HFpEF  - resume home lasix 20 mg PO   - strict I's and O's; be cautious with IVF    #HTN   - holding home amlodipine; discontinue at discharge    Please ensure patient has follow up with cardiologist Dr. Mariscal 1 week after discharge.      89F PMH HTN, HLD, DM, hypothyroidism, CAD (cardiac cath), moderate AS, chronic diastolic CHF, Asthma/COPD (last hospitalization years ago, denies intubations), PMR (on MTX and Prednisone), osteoarthritis, h/o diverticulitis and LGIB ([facility]. Patient found to be anemic s/p 3 units of pRBC and IVF. Cardiology consulted for medical optimization.     #CAD   Patient has mid RCA disease that needs to be intervened. Plan was to have staged PCI in October but lost to follow up.   - will need C once patient is stabilized from GI perspective   - start atorva 40  - start ASA when safe from bleeding perspective   - start metop succinate 12.5mg xl     #Pre-op  Patient is an intermediate risk for an intermediate risk procedure. RCRI 2 - 10.1% 30 day risk of death, MI or cardiac arrest   - medically optimized    #moderate AS   - be cautious with IVF as well as over diuresis, avoid vasodilators     #HFpEF  - resume home lasix 20 mg PO   - strict I's and O's; be cautious with IVF    #HTN   - holding home amlodipine; discontinue at discharge    Please ensure patient has follow up with cardiologist Dr. Mariscal 1 week after discharge. Please re-consult as needed.

## 2021-06-10 NOTE — PROGRESS NOTE ADULT - ATTENDING COMMENTS
Pt was seen and examined on 06/10/2021. Agree with the above. Pt was seen and examined on 06/11/2021. Agree with the above.

## 2021-06-10 NOTE — DIETITIAN INITIAL EVALUATION ADULT. - DIET TYPE
Recommend advance to low fiber low Na diet when feasible/low sodium/fiber/residue restricted/supplement (specify)

## 2021-06-10 NOTE — DIETITIAN NUTRITION RISK NOTIFICATION - WEIGHT LOSS
Date: 8/14/2020    Time: 1:01 PM    Patient Placed On BIPAP/CPAP/ Non-Invasive Ventilation? Yes    If no must comment. Facial area red/color change? No           If YES are Blister/Lesion present? No   If yes must notify nursing staff  BIPAP/CPAP skin barrier?   Yes    Skin barrier type:duoderm       Comments:        Joan Mathias Sudden weight loss during hospitalization or prior to admission

## 2021-06-10 NOTE — PROGRESS NOTE ADULT - ATTENDING COMMENTS
Initial attending contact date  6/10/21    . See fellow note written above for details. I reviewed the fellow documentation. I have personally seen and examined this patient. I reviewed vitals, labs, medications, cardiac studies, and additional imaging. I agree with the above fellow's findings and plans as written above with the following additions/statements.    89F PMH HTN, HLD, DM, hypothyroidism, CAD (cardiac cath), moderate AS, chronic diastolic CHF, Asthma/COPD (last hospitalization years ago, denies intubations), PMR (on MTX and Prednisone), osteoarthritis, h/o diverticulitis and LGIB ([facility]. Patient found to be anemic s/p 3 units of pRBC and IVF. Cardiology consulted for medical optimization.     -Now S/p colo/egd - with gastritis, no active bleed. H/H stable without recurrent GI bleed  - CAD: Patient has mid RCA disease that needs to be intervened. Plan was to have staged PCI in October but lost to follow up.   - will need C once patient is stabilized from GI perspective, however not on this admission  - Cont atorva 40, Start ASA 81 mg qd and toprol 12.5 mg qd  -Can resume home lasix 20 mg poqd upon dc  - Pt to fu with Dr Mariscal, her outpatient cardiologist for fu  -Pls reconsult as needed

## 2021-06-10 NOTE — PROGRESS NOTE ADULT - SUBJECTIVE AND OBJECTIVE BOX
GASTROENTEROLOGY PROGRESS NOTE  Patient seen and examined at bedside. No further BM's o/n or this AM.     PERTINENT REVIEW OF SYSTEMS:  CONSTITUTIONAL: No weakness, fevers or chills  HEENT: No visual changes; No vertigo or throat pain   GASTROINTESTINAL: As above.  NEUROLOGICAL: No numbness or weakness  SKIN: No itching, burning, rashes, or lesions     Allergies    Fosamax (Angioedema)  lisinopril (Angioedema)  sulfa drugs (Hives)  tetanus immune globulin (Hives)    Intolerances      MEDICATIONS:  MEDICATIONS  (STANDING):  albuterol/ipratropium for Nebulization 3 milliLiter(s) Nebulizer every 6 hours  atorvastatin 40 milliGRAM(s) Oral at bedtime  chlorhexidine 2% Cloths 1 Application(s) Topical <User Schedule>  chlorhexidine 4% Liquid 1 Application(s) Topical <User Schedule>  dextrose 40% Gel 15 Gram(s) Oral once  dextrose 5%. 1000 milliLiter(s) (50 mL/Hr) IV Continuous <Continuous>  dextrose 5%. 1000 milliLiter(s) (100 mL/Hr) IV Continuous <Continuous>  dextrose 50% Injectable 25 Gram(s) IV Push once  dextrose 50% Injectable 12.5 Gram(s) IV Push once  glucagon  Injectable 1 milliGRAM(s) IntraMuscular once  insulin lispro (ADMELOG) corrective regimen sliding scale   SubCutaneous every 6 hours  pantoprazole  Injectable 40 milliGRAM(s) IV Push two times a day    MEDICATIONS  (PRN):  acetaminophen    Suspension .. 650 milliGRAM(s) Oral every 6 hours PRN Temp greater or equal to 38C (100.4F), Moderate Pain (4 - 6)  ALBUTerol    90 MICROgram(s) HFA Inhaler 2 Puff(s) Inhalation every 6 hours PRN Shortness of Breath    Vital Signs Last 24 Hrs  T(C): 37.1 (10 Elvin 2021 05:40), Max: 37.2 (2021 17:25)  T(F): 98.8 (10 Elvin 2021 05:40), Max: 99 (2021 17:25)  HR: 94 (10 Elvin 2021 10:00) (83 - 103)  BP: 142/68 (10 Elvin 2021 10:00) (103/57 - 151/76)  BP(mean): 98 (10 Elvin 2021 10:00) (76 - 104)  RR: 16 (10 Elvin 2021 10:00) (14 - 17)  SpO2: 97% (10 Elvin 2021 10:00) (92% - 100%)     @ 07:01  -  06-10 @ 07:00  --------------------------------------------------------  IN: 770 mL / OUT: 2210 mL / NET: -1440 mL    06-10 @ 07:01  -  06-10 @ 12:00  --------------------------------------------------------  IN: 0 mL / OUT: 100 mL / NET: -100 mL      PHYSICAL EXAM:    General: in no acute distress  HEENT: MMM, conjunctiva and sclera clear  Gastrointestinal: Soft non-tender non-distended; No rebound or guarding  Skin: Warm and dry. No obvious rash    LABS:                        8.7    13.64 )-----------( 139      ( 10 Elvin 2021 05:35 )             26.4     06-10    145  |  117<H>  |  13  ----------------------------<  134<H>  4.3   |  23  |  0.74    Ca    7.1<L>      10 Elvin 2021 05:35  Phos  1.7     06-10  Mg     2.2     06-10    TPro  x   /  Alb  2.3<L>  /  TBili  x   /  DBili  x   /  AST  x   /  ALT  x   /  AlkPhos  x   06-09          Urinalysis Basic - ( 2021 23:24 )    Color: Yellow / Appearance: Clear / S.010 / pH: x  Gluc: x / Ketone: 40 mg/dL  / Bili: Negative / Urobili: 0.2 E.U./dL   Blood: x / Protein: NEGATIVE mg/dL / Nitrite: NEGATIVE   Leuk Esterase: NEGATIVE / RBC: 5-10 /HPF / WBC 5-10 /HPF   Sq Epi: x / Non Sq Epi: 0-5 /HPF / Bacteria: Present /HPF                Culture - Blood (collected 2021 00:19)  Source: .Blood Blood  Preliminary Report (10 Elvin 2021 01:00):    No growth at 1 day.      RADIOLOGY & ADDITIONAL STUDIES:  Reviewed

## 2021-06-10 NOTE — PROGRESS NOTE ADULT - SUBJECTIVE AND OBJECTIVE BOX
INTERVAL EVENTS: roseann    PAST MEDICAL & SURGICAL HISTORY:  HTN (hypertension)    COPD (chronic obstructive pulmonary disease)    Arthritis    Polymyalgia rheumatica    Hyperlipidemia    Chronic diastolic heart failure    Coronary artery disease    Diabetes    Moderate aortic stenosis    H/O bilateral oophorectomy    H/O total hysterectomy        MEDICATIONS  (STANDING):  albuterol/ipratropium for Nebulization 3 milliLiter(s) Nebulizer every 6 hours  atorvastatin 40 milliGRAM(s) Oral at bedtime  chlorhexidine 2% Cloths 1 Application(s) Topical <User Schedule>  chlorhexidine 4% Liquid 1 Application(s) Topical <User Schedule>  dextrose 40% Gel 15 Gram(s) Oral once  dextrose 5%. 1000 milliLiter(s) (50 mL/Hr) IV Continuous <Continuous>  dextrose 5%. 1000 milliLiter(s) (100 mL/Hr) IV Continuous <Continuous>  dextrose 50% Injectable 25 Gram(s) IV Push once  dextrose 50% Injectable 12.5 Gram(s) IV Push once  glucagon  Injectable 1 milliGRAM(s) IntraMuscular once  insulin lispro (ADMELOG) corrective regimen sliding scale   SubCutaneous every 6 hours  pantoprazole  Injectable 40 milliGRAM(s) IV Push two times a day    MEDICATIONS  (PRN):  acetaminophen    Suspension .. 650 milliGRAM(s) Oral every 6 hours PRN Temp greater or equal to 38C (100.4F), Moderate Pain (4 - 6)  ALBUTerol    90 MICROgram(s) HFA Inhaler 2 Puff(s) Inhalation every 6 hours PRN Shortness of Breath    Vital Signs Last 24 Hrs  T(C): 37 (10 Elvin 2021 14:48), Max: 37.2 (2021 17:25)  T(F): 98.6 (10 Elvin 2021 14:48), Max: 99 (2021 17:25)  HR: 88 (10 Elvin 2021 15:00) (82 - 96)  BP: 159/73 (10 Elvin 2021 15:00) (103/57 - 159/73)  BP(mean): 103 (10 Elvin 2021 15:00) (76 - 104)  RR: 16 (10 Elvin 2021 15:00) (14 - 17)  SpO2: 98% (10 Elvin 2021 15:00) (92% - 98%)    PHYSICAL EXAM:  GEN: NAD  HEENT: EOMI   RESP: CTA b/l  CV: RRR. Normal S1/S2. No m/r/g.  ABD: soft, non-distended  EXT: No edema   NEURO: alert and attentive    LABS:                        8.9    14.89 )-----------( 155      ( 10 Elvin 2021 14:18 )             26.4     06-10    145  |  117<H>  |  13  ----------------------------<  134<H>  4.3   |  23  |  0.74    Ca    7.1<L>      10 Elvin 2021 05:35  Phos  1.7     06-10  Mg     2.2     06-10    TPro  x   /  Alb  2.3<L>  /  TBili  x   /  DBili  x   /  AST  x   /  ALT  x   /  AlkPhos  x   -          Urinalysis Basic - ( 2021 23:24 )    Color: Yellow / Appearance: Clear / S.010 / pH: x  Gluc: x / Ketone: 40 mg/dL  / Bili: Negative / Urobili: 0.2 E.U./dL   Blood: x / Protein: NEGATIVE mg/dL / Nitrite: NEGATIVE   Leuk Esterase: NEGATIVE / RBC: 5-10 /HPF / WBC 5-10 /HPF   Sq Epi: x / Non Sq Epi: 0-5 /HPF / Bacteria: Present /HPF      I&O's Summary    2021 07:  -  10 Elvin 2021 07:00  --------------------------------------------------------  IN: 770 mL / OUT: 2210 mL / NET: -1440 mL    10 Elvin 2021 07:01  -  10 Elvin 2021 17:21  --------------------------------------------------------  IN: 0 mL / OUT: 300 mL / NET: -300 mL

## 2021-06-10 NOTE — PROGRESS NOTE ADULT - ASSESSMENT
89F PMH HTN, HLD, DM, hypothyroidism, CAD (cardiac cath), moderate AS, chronic diastolic CHF, Asthma/COPD (last hospitalization years ago, denies intubations), PMR (on MTX and Prednisone), osteoarthritis, h/o diverticulitis and LGIB (pre[facility]. GI consulted for hematochezia.     #Hematochezia  - Maintain active T&S, large bore IV access  - Transfusion threshold per primary team  - s/p EGD+colonoscopy w/ gastritis, possible GIST, and pandiverticulosis w/o active bleeding  - advance diet as tolerated  - PPI once daily  - can f/u GIST as outpatient    Sadia Hameed MD  PGY-4, Gastroenterology Fellow  pager: [phone]

## 2021-06-10 NOTE — DIETITIAN INITIAL EVALUATION ADULT. - OTHER INFO
89F PMH HTN, HLD, DM, hypothyroidism, CAD (s/p CHETAN mRCA in remote [facility]), moderate AS, chronic diastolic CHF, Asthma/COPD (last hospitalization years ago, denies intubations), PMR (on MTX and Prednisone), osteoarthritis, h/o diverticulitis and LGIB (previously admission 9/2020). Pt presented to [facility] on 6/8 with 1 day Hx of bloody stools and hypotensive SBP 80s, responsive to IVF and she was transferred to [facility] SICU for hemodynamic monitoring in the setting of acute lower GI bleed. Now s/p 3units PRBCs, EGD/C-scope showing GIST and extensive diverticulosis without active bleed. Hgb stable x3, clinically improving. Will advance diet and downgrade to tele.    On assessment, pt resting in bed comfortably. Diet just advanced to Full liquid low Na diet this am- RD to follow to assess PO. No reported n/v/d/c. Last BM 6/9. +F. No abd distention or discomfort. Skin WDL, mitul score 16. No pain noted. Pt reporting that over the last year, her appetite has slightly decreased. Suspect meeting <75% est needs >3 months. Noted that her UBW was 136lbs, now admitting at 129lb revealing a -7lb/ 5% weight loss over 1 year. +NFPE for moderate wasting. Per ASPEN guidelines, pt meets criteria for moderate malnutrition- team made aware. Education provided on full liquid diet and likely diet advancement- pt receptive. RD encouraging PO intake to prevent further weight loss, pt onboard with addition of ONS. Please see nutr recs below. RD to follow.

## 2021-06-10 NOTE — PROGRESS NOTE ADULT - SUBJECTIVE AND OBJECTIVE BOX
ON:   Repeat Hgb 8.4 - @7- repeat again @MN 8.5 f/u repeat in am.  K 3.4 @8pm - still due for 60meq so no additional K ordered. Repeat BMP @MN 4.2 - No changes. Uo dec to 25-30 in am however pt 58kg  pt hd stable pulse in 80's f/u Am Hgb ( 8.7).     SUBJECTIVE: Patient seen and examined at bedside with chief.       MEDICATIONS  (PRN):  acetaminophen    Suspension .. 650 milliGRAM(s) Oral every 6 hours PRN Temp greater or equal to 38C (100.4F), Moderate Pain (4 - 6)  ALBUTerol    90 MICROgram(s) HFA Inhaler 2 Puff(s) Inhalation every 6 hours PRN Shortness of Breath      I&O's Detail    2021 07:01  -  10 Elvin 2021 07:00  --------------------------------------------------------  IN:    IV PiggyBack: 550 mL    Platelets - Single Donor: 220 mL  Total IN: 770 mL    OUT:    Indwelling Catheter - Urethral (mL): 2135 mL    Intermittent Catheterization - Urethral (mL): 75 mL  Total OUT: 2210 mL    Total NET: -1440 mL      10 Elvin 2021 07:01  -  10 Elvin 2021 13:53  --------------------------------------------------------  IN:  Total IN: 0 mL    OUT:    Indwelling Catheter - Urethral (mL): 180 mL  Total OUT: 180 mL    Total NET: -180 mL          T(C): 37.1 (06-10-21 @ 05:40), Max: 37.2 (21 @ 17:25)  HR: 82 (06-10-21 @ 13:00) (82 - 103)  BP: 120/66 (06-10-21 @ 13:00) (103/57 - 151/76)  RR: 16 (06-10-21 @ 13:00) (14 - 17)  SpO2: 97% (06-10-21 @ 13:00) (92% - 100%)    GENERAL: NAD, Resting comfortably in bed, awake, opens eyes spontaneously  HEENT: NCAT, MMM, Normal conjunctiva, PERRL  RESP: Nonlabored breathing, No respiratory distress  CARD: Normal rate, Normal peripheral perfusion  GI: Soft, ND, NT, No guarding, No rebound tenderness  EXTREM: WWP, No edema, No gross deformity of extremities  SKIN: No rashes, no lesions  NEURO: AAOx3, No focal motor or sensory deficits  PSYCH: Affect and characteristics of appearance, verbalizations, and behaviors are appropriate    LABS:                        8.7    13.64 )-----------( 139      ( 10 Elvin 2021 05:35 )             26.4     06-10    145  |  117<H>  |  13  ----------------------------<  134<H>  4.3   |  23  |  0.74    Ca    7.1<L>      10 Elvin 2021 05:35  Phos  1.7     06-10  Mg     2.2     06-10    TPro  x   /  Alb  2.3<L>  /  TBili  x   /  DBili  x   /  AST  x   /  ALT  x   /  AlkPhos  x         Urinalysis Basic - ( 2021 23:24 )    Color: Yellow / Appearance: Clear / S.010 / pH: x  Gluc: x / Ketone: 40 mg/dL  / Bili: Negative / Urobili: 0.2 E.U./dL   Blood: x / Protein: NEGATIVE mg/dL / Nitrite: NEGATIVE   Leuk Esterase: NEGATIVE / RBC: 5-10 /HPF / WBC 5-10 /HPF   Sq Epi: x / Non Sq Epi: 0-5 /HPF / Bacteria: Present /HPF        RADIOLOGY & ADDITIONAL STUDIES:      Culture - Blood (collected 21 @ 00:19)  Source: .Blood Blood  Preliminary Report (06-10-21 @ 01:00):    No growth at 1 day.

## 2021-06-10 NOTE — PROGRESS NOTE ADULT - ATTENDING COMMENTS
Doing ok.   No blood per rectum.   H&H is unchanged.  Hemodynamically is stable.  EGD and colonoscopy findings noted.  Start PO intake.  Downgrade to telemetry.  Will follow.

## 2021-06-10 NOTE — DIETITIAN INITIAL EVALUATION ADULT. - ADD RECOMMEND
1. Recommend addition of MVI 2. Cont to monitor lytes and replete prn 3. Pain and bowel regime per team 4. RD diet edu prn

## 2021-06-10 NOTE — DIETITIAN NUTRITION RISK NOTIFICATION - ADDITIONAL COMMENTS/DIETITIAN RECOMMENDATIONS
1. Low Na full liquid diet  >> Recommend advance to low fiber low Na diet when feasible  >> Recommend addition of Ensure Enlive BID (700 kcal, 40g protein, 360 mL free H2O)  2. Recommend addition of MVI daily  3. Cont to monitor lytes and replete prn   4. Pain and bowel regime per team   5. RD diet edu prn

## 2021-06-10 NOTE — PROGRESS NOTE ADULT - ASSESSMENT
89F PMH HTN, HLD, DM, hypothyroidism, CAD (s/p CHETAN mRCA in remote [facility]), moderate AS, chronic diastolic CHF, Asthma/COPD (last hospitalization years ago, denies intubations), PMR (on MTX and Prednisone), osteoarthritis, h/o diverticulitis and LGIB (previously admission 9/2020). Pt presented to [facility] on 6/8 with 1 day Hx of bloody stools and hypotensive SBP 80s, responsive to IVF and she was transferred to [facility] SICU for hemodynamic monitoring in the setting of acute lower GI bleed. Now s/p 3units PRBCs, EGD/C-scope showing GIST and extensive diverticulosis without active bleed. Hgb stable x3, clinically improving. Will advance diet and downgrade to tele.    NEURO: Pain controlled  CV: CAD, Echo 9/15-EF 64 %, mod Aortic stenosis holding BP meds  PULM: on RA, Duoneb albuterol PRN  GI/FEN: FLD DASH/TLC CC, PPI BID, NTD from IR perspective, EGD (6/9): polypoid mass/?GIST, C-scope (6/9): diverticulosis   : Kidney stones & B/L hydronephrosis on CT with distended bladder: Cont Baird  ENDO: ISS, IV Synthroid, prednisone 7mg QD   HEME: 3U pRBC. 1U Platelets, Serial CBCs q8  PPx: Holding AC  LINES: PIV, A-line placed (6/8--)  WOUNDS: Warm compress to rt arm hematoma from IV infiltration of PRBC.   PT: not ordered

## 2021-06-11 LAB
ANION GAP SERPL CALC-SCNC: 7 MMOL/L — SIGNIFICANT CHANGE UP (ref 5–17)
BUN SERPL-MCNC: 9 MG/DL — SIGNIFICANT CHANGE UP (ref 7–23)
CALCIUM SERPL-MCNC: 6.8 MG/DL — LOW (ref 8.4–10.5)
CHLORIDE SERPL-SCNC: 114 MMOL/L — HIGH (ref 96–108)
CK MB CFR SERPL CALC: 3.3 NG/ML — SIGNIFICANT CHANGE UP (ref 0–6.7)
CK SERPL-CCNC: 650 U/L — HIGH (ref 25–170)
CO2 SERPL-SCNC: 23 MMOL/L — SIGNIFICANT CHANGE UP (ref 22–31)
CREAT SERPL-MCNC: 0.74 MG/DL — SIGNIFICANT CHANGE UP (ref 0.5–1.3)
GLUCOSE BLDC GLUCOMTR-MCNC: 104 MG/DL — HIGH (ref 70–99)
GLUCOSE BLDC GLUCOMTR-MCNC: 125 MG/DL — HIGH (ref 70–99)
GLUCOSE BLDC GLUCOMTR-MCNC: 134 MG/DL — HIGH (ref 70–99)
GLUCOSE BLDC GLUCOMTR-MCNC: 138 MG/DL — HIGH (ref 70–99)
GLUCOSE SERPL-MCNC: 98 MG/DL — SIGNIFICANT CHANGE UP (ref 70–99)
HCT VFR BLD CALC: 25.5 % — LOW (ref 34.5–45)
HCT VFR BLD CALC: 28.3 % — LOW (ref 34.5–45)
HGB BLD-MCNC: 8.3 G/DL — LOW (ref 11.5–15.5)
HGB BLD-MCNC: 9.2 G/DL — LOW (ref 11.5–15.5)
MAGNESIUM SERPL-MCNC: 2.2 MG/DL — SIGNIFICANT CHANGE UP (ref 1.6–2.6)
MCHC RBC-ENTMCNC: 30 PG — SIGNIFICANT CHANGE UP (ref 27–34)
MCHC RBC-ENTMCNC: 30.2 PG — SIGNIFICANT CHANGE UP (ref 27–34)
MCHC RBC-ENTMCNC: 32.5 GM/DL — SIGNIFICANT CHANGE UP (ref 32–36)
MCHC RBC-ENTMCNC: 32.5 GM/DL — SIGNIFICANT CHANGE UP (ref 32–36)
MCV RBC AUTO: 92.1 FL — SIGNIFICANT CHANGE UP (ref 80–100)
MCV RBC AUTO: 92.8 FL — SIGNIFICANT CHANGE UP (ref 80–100)
NRBC # BLD: 0 /100 WBCS — SIGNIFICANT CHANGE UP (ref 0–0)
NRBC # BLD: 0 /100 WBCS — SIGNIFICANT CHANGE UP (ref 0–0)
PHOSPHATE SERPL-MCNC: 2.4 MG/DL — LOW (ref 2.5–4.5)
PLATELET # BLD AUTO: 137 K/UL — LOW (ref 150–400)
PLATELET # BLD AUTO: 143 K/UL — LOW (ref 150–400)
POTASSIUM SERPL-MCNC: 3.2 MMOL/L — LOW (ref 3.5–5.3)
POTASSIUM SERPL-SCNC: 3.2 MMOL/L — LOW (ref 3.5–5.3)
RBC # BLD: 2.77 M/UL — LOW (ref 3.8–5.2)
RBC # BLD: 3.05 M/UL — LOW (ref 3.8–5.2)
RBC # FLD: 15.3 % — HIGH (ref 10.3–14.5)
RBC # FLD: 15.4 % — HIGH (ref 10.3–14.5)
SODIUM SERPL-SCNC: 144 MMOL/L — SIGNIFICANT CHANGE UP (ref 135–145)
TROPONIN T SERPL-MCNC: <0.01 NG/ML — SIGNIFICANT CHANGE UP (ref 0–0.01)
WBC # BLD: 10.26 K/UL — SIGNIFICANT CHANGE UP (ref 3.8–10.5)
WBC # BLD: 11.72 K/UL — HIGH (ref 3.8–10.5)
WBC # FLD AUTO: 10.26 K/UL — SIGNIFICANT CHANGE UP (ref 3.8–10.5)
WBC # FLD AUTO: 11.72 K/UL — HIGH (ref 3.8–10.5)

## 2021-06-11 PROCEDURE — 99233 SBSQ HOSP IP/OBS HIGH 50: CPT | Mod: GC

## 2021-06-11 RX ORDER — HEPARIN SODIUM 5000 [USP'U]/ML
5000 INJECTION INTRAVENOUS; SUBCUTANEOUS EVERY 8 HOURS
Refills: 0 | Status: DISCONTINUED | OUTPATIENT
Start: 2021-06-11 | End: 2021-06-13

## 2021-06-11 RX ORDER — METOPROLOL TARTRATE 50 MG
12.5 TABLET ORAL DAILY
Refills: 0 | Status: DISCONTINUED | OUTPATIENT
Start: 2021-06-11 | End: 2021-06-13

## 2021-06-11 RX ORDER — POTASSIUM CHLORIDE 20 MEQ
40 PACKET (EA) ORAL ONCE
Refills: 0 | Status: COMPLETED | OUTPATIENT
Start: 2021-06-11 | End: 2021-06-11

## 2021-06-11 RX ORDER — POTASSIUM PHOSPHATE, MONOBASIC POTASSIUM PHOSPHATE, DIBASIC 236; 224 MG/ML; MG/ML
30 INJECTION, SOLUTION INTRAVENOUS ONCE
Refills: 0 | Status: COMPLETED | OUTPATIENT
Start: 2021-06-11 | End: 2021-06-11

## 2021-06-11 RX ADMIN — Medication 40 MILLIEQUIVALENT(S): at 07:45

## 2021-06-11 RX ADMIN — HEPARIN SODIUM 5000 UNIT(S): 5000 INJECTION INTRAVENOUS; SUBCUTANEOUS at 14:46

## 2021-06-11 RX ADMIN — ATORVASTATIN CALCIUM 40 MILLIGRAM(S): 80 TABLET, FILM COATED ORAL at 22:06

## 2021-06-11 RX ADMIN — CHLORHEXIDINE GLUCONATE 1 APPLICATION(S): 213 SOLUTION TOPICAL at 05:24

## 2021-06-11 RX ADMIN — HEPARIN SODIUM 5000 UNIT(S): 5000 INJECTION INTRAVENOUS; SUBCUTANEOUS at 22:06

## 2021-06-11 RX ADMIN — Medication 5 MILLIGRAM(S): at 05:29

## 2021-06-11 RX ADMIN — PANTOPRAZOLE SODIUM 40 MILLIGRAM(S): 20 TABLET, DELAYED RELEASE ORAL at 05:24

## 2021-06-11 RX ADMIN — Medication 3 MILLILITER(S): at 22:06

## 2021-06-11 RX ADMIN — Medication 3 MILLILITER(S): at 16:30

## 2021-06-11 RX ADMIN — Medication 3 MILLILITER(S): at 09:44

## 2021-06-11 RX ADMIN — CHLORHEXIDINE GLUCONATE 1 APPLICATION(S): 213 SOLUTION TOPICAL at 05:59

## 2021-06-11 RX ADMIN — Medication 3 MILLILITER(S): at 04:59

## 2021-06-11 RX ADMIN — POTASSIUM PHOSPHATE, MONOBASIC POTASSIUM PHOSPHATE, DIBASIC 83.33 MILLIMOLE(S): 236; 224 INJECTION, SOLUTION INTRAVENOUS at 08:14

## 2021-06-11 RX ADMIN — PANTOPRAZOLE SODIUM 40 MILLIGRAM(S): 20 TABLET, DELAYED RELEASE ORAL at 17:28

## 2021-06-11 RX ADMIN — Medication 88 MICROGRAM(S): at 05:29

## 2021-06-11 NOTE — PROGRESS NOTE ADULT - SUBJECTIVE AND OBJECTIVE BOX
INTERVAL HPI/OVERNIGHT EVENTS: ON: patient feeling heavy chest (pressure) EKG no changes from previous, Trop and CKMB normal,   6/10: adv. FLD DASH/TLC CC, switched to PO prednisone, 1 non bloody BM, Hgb @ 12pm 8.9 form 8.7    Examined at the bedside resting comfortably. States neck pain improved. Passing flatus, BM yesterday non bloody. Tolerating diet w/o nausea or emesis. Denies cp, sob, fevers, chills. No acute complaints    PRESSORS: [ ] YES [ ] NO  WHICH:  DOSE:    ANTIBIOTICS:                  DATE STARTED:  ANTIBIOTICS:                  DATE STARTED:  ANTIBIOTICS:                  DATE STARTED:    CENTRAL LINE: [ ] YES [ ] NO  LOCATION:   DATE INSERTED:  REMOVE: [ ] YES [ ] NO  EXPLAIN:    [place]: [ ] YES [ ] NO    DATE INSERTED:  REMOVE: [ ] YES [ ] NO  EXPLAIN:    A-LINE: [ ] YES [ ] NO  LOCATION:   DATE INSERTED:  REMOVE: [ ] YES [ ] NO  EXPLAIN:    ICU Vital Signs Last 24 Hrs  T(C): 36.7 (11 Jun 2021 05:03), Max: 37 (10 Elvin 2021 14:48)  T(F): 98 (11 Jun 2021 05:03), Max: 98.6 (10 Elvin 2021 14:48)  HR: 85 (11 Jun 2021 07:00) (80 - 99)  BP: 146/72 (11 Jun 2021 07:00) (110/56 - 159/73)  BP(mean): 102 (11 Jun 2021 07:00) (78 - 120)  ABP: 90/73 (10 Elvin 2021 13:00) (90/73 - 141/103)  ABP(mean): 81 (10 Elvin 2021 13:00) (81 - 119)  RR: 16 (11 Jun 2021 07:00) (12 - 18)  SpO2: 95% (11 Jun 2021 07:00) (92% - 100%)            10 Elvin 2021 07:01  -  11 Jun 2021 07:00  --------------------------------------------------------  IN:  Total IN: 0 mL    OUT:    Indwelling Catheter - Urethral (mL): 1169 mL  Total OUT: 1169 mL    Total NET: -1169 mL      11 Jun 2021 07:01  -  11 Jun 2021 08:02  --------------------------------------------------------  IN:  Total IN: 0 mL    OUT:    Indwelling Catheter - Urethral (mL): 175 mL  Total OUT: 175 mL    Total NET: -175 mL    PHYSICAL EXAM:  General: NAD, resting comfortably in bed  C/V: NSR, Aortic and tricuspid murmur, no rubs or gallops  Pulm: Nonlabored breathing, no respiratory distress, On RA, crackles at the lung bases, no wheezes or rhonchi  Abd: soft, non-distended, non tender tenderness, no rebound or guarding  Extrem: WWP, edema improved  Neuro: A/O x 3, CNs II-XII grossly intact, no focal deficits, normal sensation  Pulses: 2+ dp/pt  : [place] w/o straw colored urine    LABS:                        8.3    11.72 )-----------( 137      ( 11 Jun 2021 05:20 )             25.5     06-11    144  |  114<H>  |  9   ----------------------------<  98  3.2<L>   |  23  |  0.74    Ca    6.8<L>      11 Jun 2021 05:20  Phos  2.4     06-11  Mg     2.2     06-11        RADIOLOGY & ADDITIONAL STUDIES:

## 2021-06-11 NOTE — PHYSICAL THERAPY INITIAL EVALUATION ADULT - GENERAL OBSERVATIONS, REHAB EVAL
PT IE completed. Patient received semi supine in bed +tele, +heplock IV, +(B) SCDs, NAD, willing to work with PT.

## 2021-06-11 NOTE — PHYSICAL THERAPY INITIAL EVALUATION ADULT - DIAGNOSIS, PT EVAL
s/p fall, hypoxic respiratory distress
6B: Impaired Aerobic Capacity/Endurance Associated with Deconditioning

## 2021-06-11 NOTE — PROGRESS NOTE ADULT - ASSESSMENT
89F PMH HTN, HLD, DM, hypothyroidism, CAD (cardiac cath), moderate AS, chronic diastolic CHF, Asthma/COPD (last hospitalization years ago, denies intubations), PMR (on MTX and Prednisone), osteoarthritis, h/o diverticulitis and LGIB (previously admission 9/2021 [facility]/ blood per rectum. GI consulted for hematochezia.     #Hematochezia  - Maintain active T&S, large bore IV access  - Transfusion threshold per primary team  - s/p EGD+colonoscopy w/ gastritis, possible GIST, and pandiverticulosis w/o active bleeding  - advance diet as tolerated  - PPI once daily  - can f/u GIST as outpatient w/ Dr. Liao    Thank you for allowing us to participate in the care of this patient.  GI will sign off. Please call back with any questions or concerns.     Sadia Hameed MD  PGY-4, Gastroenterology Fellow  pager: [phone]

## 2021-06-11 NOTE — PROGRESS NOTE ADULT - ASSESSMENT
89F PMH HTN, HLD, DM, hypothyroidism, CAD (s/p CHETAN mRCA in remote [facility]), moderate AS, chronic diastolic CHF, Asthma/COPD (last hospitalization years ago, denies intubations), PMR (on MTX and Prednisone), osteoarthritis, h/o diverticulitis and LGIB (previously admission 9/2020). Pt presented to [facility] on 6/8 with 1 day Hx of bloody stools and hypotensive SBP 80s, responsive to IVF and she was transferred to [facility] SICU for hemodynamic monitoring in the setting of acute lower GI bleed. Now s/p 3units PRBCs, EGD/C-scope showing GIST and extensive diverticulosis without active bleed. Hgb stable. Clinically stable 89F PMH HTN, HLD, DM, hypothyroidism, CAD (s/p CHETAN mRCA in remote [facility]), moderate AS, chronic diastolic CHF, Asthma/COPD (last hospitalization years ago, denies intubations), PMR (on MTX and Prednisone), osteoarthritis, h/o diverticulitis and LGIB (previously admission 9/2020). Pt presented to [facility] on 6/8 with 1 day Hx of bloody stools and hypotensive SBP 80s, responsive to IVF and she was transferred to [facility] SICU for hemodynamic monitoring in the setting of acute lower GI bleed. Now s/p 3units PRBCs, EGD/C-scope showing GIST and extensive diverticulosis without active bleed. Hgb stable. Clinically stable    NEURO: Pain controlled  CV: CAD, Echo 9/15-EF 64 %, mod Aortic stenosis holding BP meds  PULM: on RA, Duoneb albuterol PRN  GI/FEN: FLD DASH/TLC CC, PPI BID, NTD from IR perspective, EGD (6/9): polypoid mass/?GIST, C-scope (6/9): diverticulosis   : Kidney stones & B/L hydronephrosis on CT with distended bladder: Cont Baird  ENDO: ISS, Synthroid, prednisone 5mg QD   HEME: 3U pRBC. 1U Platelets, Serial CBCs q8  PPx: SQH   LINES: PIV, A-line placed (6/8--)  WOUNDS: Warm compress to rt arm hematoma from IV infiltration of PRBC.   PT: ordered

## 2021-06-11 NOTE — PHYSICAL THERAPY INITIAL EVALUATION ADULT - TRANSFER SAFETY CONCERNS NOTED: SIT/STAND, REHAB EVAL
Demo good eccentric control; Performed sit<>stand x2 trials 2/2 patient having BM during PT session.

## 2021-06-11 NOTE — PROGRESS NOTE ADULT - SUBJECTIVE AND OBJECTIVE BOX
INTERVAL HPI/OVERNIGHT EVENTS: ON: patient feeling heavy chest (pressure) EKG no changes from previous, Trop and CKMB normal,   6/10: adv. FLD DASH/TLC CC, switched to PO prednisone, 1 non bloody BM, Hgb @ 12pm 8.9 form 8.7    SUBJECTIVE: Patient seen and examined at bedside with chief. No pain. Yesterday with nonbloody BM. Passing flatus. No complaints. ROS negative.    heparin   Injectable 5000 Unit(s) SubCutaneous every 8 hours  metoprolol succinate ER 12.5 milliGRAM(s) Oral daily    MEDICATIONS  (PRN):  acetaminophen    Suspension .. 650 milliGRAM(s) Oral every 6 hours PRN Temp greater or equal to 38C (100.4F), Moderate Pain (4 - 6)  ALBUTerol    90 MICROgram(s) HFA Inhaler 2 Puff(s) Inhalation every 6 hours PRN Shortness of Breath      I&O's Detail    10 Elvin 2021 07:01  -  11 Jun 2021 07:00  --------------------------------------------------------  IN:  Total IN: 0 mL    OUT:    Indwelling Catheter - Urethral (mL): 1169 mL  Total OUT: 1169 mL    Total NET: -1169 mL      11 Jun 2021 07:01  -  11 Jun 2021 12:25  --------------------------------------------------------  IN:    IV PiggyBack: 333.2 mL  Total IN: 333.2 mL    OUT:    Indwelling Catheter - Urethral (mL): 175 mL    Voided (mL): 550 mL  Total OUT: 725 mL    Total NET: -391.8 mL          T(C): 36.7 (06-11-21 @ 05:03), Max: 37 (06-10-21 @ 14:48)  HR: 107 (06-11-21 @ 12:00) (79 - 107)  BP: 157/86 (06-11-21 @ 12:00) (110/56 - 159/73)  RR: 17 (06-11-21 @ 12:00) (12 - 18)  SpO2: 96% (06-11-21 @ 12:00) (92% - 100%)    GENERAL: NAD, Resting comfortably in bed, awake, opens eyes spontaneously  HEENT: NCAT, MMM, Normal conjunctiva, PERRL  RESP: Nonlabored breathing, No respiratory distress  CARD: Normal rate, Normal peripheral perfusion  GI: Soft, ND, NT, No guarding, No rebound tenderness  EXTREM: WWP, No edema, No gross deformity of extremities  SKIN: No rashes, no lesions  NEURO: AAOx3, No focal motor or sensory deficits  PSYCH: Affect and characteristics of appearance, verbalizations, and behaviors are appropriate      LABS:                        8.3    11.72 )-----------( 137      ( 11 Jun 2021 05:20 )             25.5     06-11    144  |  114<H>  |  9   ----------------------------<  98  3.2<L>   |  23  |  0.74    Ca    6.8<L>      11 Jun 2021 05:20  Phos  2.4     06-11  Mg     2.2     06-11            RADIOLOGY & ADDITIONAL STUDIES:      Culture - Blood (collected 06-09-21 @ 00:19)  Source: .Blood Blood  Preliminary Report (06-11-21 @ 01:00):    No growth at 2 days.     INTERVAL HPI/OVERNIGHT EVENTS: ON: patient feeling heavy chest (pressure) EKG no changes from previous, Trop and CKMB normal,   6/10: adv. FLD DASH/TLC CC, switched to PO prednisone, 1 non bloody BM, Hgb @ 12pm 8.9 form 8.7    SUBJECTIVE: Patient seen and examined at bedside with chief. No pain. Yesterday with nonbloody BM. Passing flatus. Estefania diet. No complaints. ROS negative.    heparin   Injectable 5000 Unit(s) SubCutaneous every 8 hours  metoprolol succinate ER 12.5 milliGRAM(s) Oral daily    MEDICATIONS  (PRN):  acetaminophen    Suspension .. 650 milliGRAM(s) Oral every 6 hours PRN Temp greater or equal to 38C (100.4F), Moderate Pain (4 - 6)  ALBUTerol    90 MICROgram(s) HFA Inhaler 2 Puff(s) Inhalation every 6 hours PRN Shortness of Breath      I&O's Detail    10 Elvin 2021 07:01  -  11 Jun 2021 07:00  --------------------------------------------------------  IN:  Total IN: 0 mL    OUT:    Indwelling Catheter - Urethral (mL): 1169 mL  Total OUT: 1169 mL    Total NET: -1169 mL      11 Jun 2021 07:01  -  11 Jun 2021 12:25  --------------------------------------------------------  IN:    IV PiggyBack: 333.2 mL  Total IN: 333.2 mL    OUT:    Indwelling Catheter - Urethral (mL): 175 mL    Voided (mL): 550 mL  Total OUT: 725 mL    Total NET: -391.8 mL          T(C): 36.7 (06-11-21 @ 05:03), Max: 37 (06-10-21 @ 14:48)  HR: 107 (06-11-21 @ 12:00) (79 - 107)  BP: 157/86 (06-11-21 @ 12:00) (110/56 - 159/73)  RR: 17 (06-11-21 @ 12:00) (12 - 18)  SpO2: 96% (06-11-21 @ 12:00) (92% - 100%)    GENERAL: NAD, Resting comfortably in bed, awake, opens eyes spontaneously  HEENT: NCAT, MMM, Normal conjunctiva, PERRL  RESP: Nonlabored breathing, No respiratory distress  CARD: Normal rate, Normal peripheral perfusion  GI: Soft, ND, NT, No guarding, No rebound tenderness  EXTREM: WWP, No edema, No gross deformity of extremities  SKIN: No rashes, no lesions  NEURO: AAOx3, No focal motor or sensory deficits  PSYCH: Affect and characteristics of appearance, verbalizations, and behaviors are appropriate      LABS:                        8.3    11.72 )-----------( 137      ( 11 Jun 2021 05:20 )             25.5     06-11    144  |  114<H>  |  9   ----------------------------<  98  3.2<L>   |  23  |  0.74    Ca    6.8<L>      11 Jun 2021 05:20  Phos  2.4     06-11  Mg     2.2     06-11            RADIOLOGY & ADDITIONAL STUDIES:      Culture - Blood (collected 06-09-21 @ 00:19)  Source: .Blood Blood  Preliminary Report (06-11-21 @ 01:00):    No growth at 2 days.

## 2021-06-11 NOTE — PROVIDER CONTACT NOTE (OTHER) - ASSESSMENT
pt is resting comfortably. still c/o of mild chest tightness that started yesterday. no c/o chest pain.

## 2021-06-11 NOTE — PHYSICAL THERAPY INITIAL EVALUATION ADULT - GAIT DEVIATIONS NOTED, PT EVAL
Demo fairly steady gait, no LOB observed and good navigation of hallway obstacles. Benefits from increased time to perform ambulation trial./decreased shiva/increased time in double stance/decreased velocity of limb motion/decreased step length/decreased stride length

## 2021-06-11 NOTE — PHYSICAL THERAPY INITIAL EVALUATION ADULT - PERTINENT HX OF CURRENT PROBLEM, REHAB EVAL
89F PMH HTN, HLD, DM, hypothyroidism, CAD (s/p cardiac cath), moderate AS, chronic diastolic CHF, Asthma/COPD (last hospitalization years ago, denies intubations), PMR (on MTX and Prednisone), osteoarthritis, h/o diverticulitis and LGIB (previously admission 9/2021 presents to [facility] w/ blood per rectum. At presentation hypotensive to 85/54, WBC 8.72, Hgb 6.8. CT performed revealed no active bleed, fecal impaction, and opacities in left lower lobe. Presentation 2/2 acute GIB.

## 2021-06-11 NOTE — PROGRESS NOTE ADULT - SUBJECTIVE AND OBJECTIVE BOX
GASTROENTEROLOGY PROGRESS NOTE  Patient seen and examined at bedside. Brown stool. Hgb stable.     PERTINENT REVIEW OF SYSTEMS:  CONSTITUTIONAL: No weakness, fevers or chills  HEENT: No visual changes; No vertigo or throat pain   GASTROINTESTINAL: As above.  NEUROLOGICAL: No numbness or weakness  SKIN: No itching, burning, rashes, or lesions     Allergies    Fosamax (Angioedema)  lisinopril (Angioedema)  sulfa drugs (Hives)  tetanus immune globulin (Hives)    Intolerances      MEDICATIONS:  MEDICATIONS  (STANDING):  albuterol/ipratropium for Nebulization 3 milliLiter(s) Nebulizer every 6 hours  atorvastatin 40 milliGRAM(s) Oral at bedtime  chlorhexidine 2% Cloths 1 Application(s) Topical <User Schedule>  chlorhexidine 4% Liquid 1 Application(s) Topical <User Schedule>  dextrose 40% Gel 15 Gram(s) Oral once  dextrose 5%. 1000 milliLiter(s) (50 mL/Hr) IV Continuous <Continuous>  dextrose 5%. 1000 milliLiter(s) (100 mL/Hr) IV Continuous <Continuous>  dextrose 50% Injectable 25 Gram(s) IV Push once  dextrose 50% Injectable 12.5 Gram(s) IV Push once  glucagon  Injectable 1 milliGRAM(s) IntraMuscular once  heparin   Injectable 5000 Unit(s) SubCutaneous every 8 hours  insulin lispro (ADMELOG) corrective regimen sliding scale   SubCutaneous every 6 hours  levothyroxine 88 MICROGram(s) Oral daily  metoprolol succinate ER 12.5 milliGRAM(s) Oral daily  pantoprazole  Injectable 40 milliGRAM(s) IV Push two times a day  predniSONE   Tablet 5 milliGRAM(s) Oral daily    MEDICATIONS  (PRN):  acetaminophen    Suspension .. 650 milliGRAM(s) Oral every 6 hours PRN Temp greater or equal to 38C (100.4F), Moderate Pain (4 - 6)  ALBUTerol    90 MICROgram(s) HFA Inhaler 2 Puff(s) Inhalation every 6 hours PRN Shortness of Breath    Vital Signs Last 24 Hrs  T(C): 36.7 (11 Jun 2021 05:03), Max: 37 (10 Elvin 2021 14:48)  T(F): 98 (11 Jun 2021 05:03), Max: 98.6 (10 Elvin 2021 14:48)  HR: 90 (11 Jun 2021 11:00) (79 - 99)  BP: 139/68 (11 Jun 2021 11:00) (110/56 - 159/73)  BP(mean): 97 (11 Jun 2021 11:00) (78 - 120)  RR: 18 (11 Jun 2021 11:00) (12 - 18)  SpO2: 99% (11 Jun 2021 11:00) (92% - 100%)    06-10 @ 07:01  -  06-11 @ 07:00  --------------------------------------------------------  IN: 0 mL / OUT: 1169 mL / NET: -1169 mL    06-11 @ 07:01 - 06-11 @ 11:56  --------------------------------------------------------  IN: 333.2 mL / OUT: 725 mL / NET: -391.8 mL      PHYSICAL EXAM:    General: in no acute distress  HEENT: MMM, conjunctiva and sclera clear  Gastrointestinal: Soft non-tender non-distended; No rebound or guarding  Skin: Warm and dry. No obvious rash    LABS:                        8.3    11.72 )-----------( 137      ( 11 Jun 2021 05:20 )             25.5     06-11    144  |  114<H>  |  9   ----------------------------<  98  3.2<L>   |  23  |  0.74    Ca    6.8<L>      11 Jun 2021 05:20  Phos  2.4     06-11  Mg     2.2     06-11                        Culture - Blood (collected 09 Jun 2021 00:19)  Source: .Blood Blood  Preliminary Report (11 Jun 2021 01:00):    No growth at 2 days.      RADIOLOGY & ADDITIONAL STUDIES:  Reviewed

## 2021-06-11 NOTE — PROGRESS NOTE ADULT - ATTENDING COMMENTS
No acute issues.  No more bleeding per rectum.  Tolerating some PO.  H&H is stable.  Transfer to telemetry.  Continue diet.  start chemical DVT prophylaxis.  Will follow.  Outpatient GI follow up regarding gastric polyp (possible GIST).

## 2021-06-11 NOTE — PHYSICAL THERAPY INITIAL EVALUATION ADULT - DISCHARGE DISPOSITION, PT EVAL
and continued assistance from her grandson, who lives with patient. Discussed with RYNE Can./home w/ home PT

## 2021-06-11 NOTE — PHYSICAL THERAPY INITIAL EVALUATION ADULT - ADDITIONAL COMMENTS
Patient is a community ambulator who lives in an elevator access apartment with her grandson, seferino HIGHTOWER. Patient ambulates with cane for household ambulation and states she uses her rollator for community ambulation. Independent with some ADLs but has her grandson assist her with dressing and cleaning. Of note, patient has an aide who comes to the house 1 day/week for ~4 hours to assist patient with cleaning, as needed. Of note, Patient has use of shower chair, commode, rollator and cane for use at home

## 2021-06-11 NOTE — PROGRESS NOTE ADULT - ASSESSMENT
89F PMH HTN, HLD, DM, hypothyroidism, CAD (s/p CHETAN mRCA in remote [facility]), moderate AS, chronic diastolic CHF, Asthma/COPD (last hospitalization years ago, denies intubations), PMR (on MTX and Prednisone), osteoarthritis, h/o diverticulitis and LGIB (previously admission 9/2020). Pt presented to [facility] on 6/8 with 1 day Hx of bloody stools and hypotensive SBP 80s, responsive to IVF and she was transferred to [facility] SICU for hemodynamic monitoring in the setting of acute lower GI bleed. Now s/p 3units PRBCs, EGD/C-scope showing GIST and extensive diverticulosis without active bleed. Hgb stable. Clinically stable    NEURO: Pain controlled  CV: CAD, Echo 9/15-EF 64 %, mod Aortic stenosis holding BP meds  PULM: on RA, Duoneb albuterol PRN  GI/FEN: FLD DASH/TLC CC, PPI BID, NTD from IR perspective, EGD (6/9): polypoid mass/?GIST, C-scope (6/9): diverticulosis   : Kidney stones & B/L hydronephrosis on CT with distended bladder: Cont Baird  ENDO: ISS, Synthroid, prednisone 5mg QD   HEME: 3U pRBC. 1U Platelets, Serial CBCs q8  PPx: SQH   LINES: PIV, A-line placed (6/8--)  WOUNDS: Warm compress to rt arm hematoma from IV infiltration of PRBC.   PT: ordered

## 2021-06-12 ENCOUNTER — TRANSCRIPTION ENCOUNTER (OUTPATIENT)
Age: 86
End: 2021-06-12

## 2021-06-12 DIAGNOSIS — I35.0 NONRHEUMATIC AORTIC (VALVE) STENOSIS: ICD-10-CM

## 2021-06-12 DIAGNOSIS — M35.3 POLYMYALGIA RHEUMATICA: ICD-10-CM

## 2021-06-12 DIAGNOSIS — I25.10 ATHEROSCLEROTIC HEART DISEASE OF NATIVE CORONARY ARTERY WITHOUT ANGINA PECTORIS: ICD-10-CM

## 2021-06-12 DIAGNOSIS — K92.2 GASTROINTESTINAL HEMORRHAGE, UNSPECIFIED: ICD-10-CM

## 2021-06-12 DIAGNOSIS — E11.9 TYPE 2 DIABETES MELLITUS WITHOUT COMPLICATIONS: ICD-10-CM

## 2021-06-12 DIAGNOSIS — I10 ESSENTIAL (PRIMARY) HYPERTENSION: ICD-10-CM

## 2021-06-12 LAB
ANION GAP SERPL CALC-SCNC: 11 MMOL/L — SIGNIFICANT CHANGE UP (ref 5–17)
BUN SERPL-MCNC: 7 MG/DL — SIGNIFICANT CHANGE UP (ref 7–23)
CALCIUM SERPL-MCNC: 7.4 MG/DL — LOW (ref 8.4–10.5)
CHLORIDE SERPL-SCNC: 114 MMOL/L — HIGH (ref 96–108)
CK SERPL-CCNC: 693 U/L — HIGH (ref 25–170)
CO2 SERPL-SCNC: 20 MMOL/L — LOW (ref 22–31)
CREAT SERPL-MCNC: 0.72 MG/DL — SIGNIFICANT CHANGE UP (ref 0.5–1.3)
GLUCOSE BLDC GLUCOMTR-MCNC: 115 MG/DL — HIGH (ref 70–99)
GLUCOSE BLDC GLUCOMTR-MCNC: 120 MG/DL — HIGH (ref 70–99)
GLUCOSE BLDC GLUCOMTR-MCNC: 92 MG/DL — SIGNIFICANT CHANGE UP (ref 70–99)
GLUCOSE BLDC GLUCOMTR-MCNC: 96 MG/DL — SIGNIFICANT CHANGE UP (ref 70–99)
GLUCOSE SERPL-MCNC: 92 MG/DL — SIGNIFICANT CHANGE UP (ref 70–99)
HCT VFR BLD CALC: 33.5 % — LOW (ref 34.5–45)
HGB BLD-MCNC: 10.3 G/DL — LOW (ref 11.5–15.5)
MAGNESIUM SERPL-MCNC: 2 MG/DL — SIGNIFICANT CHANGE UP (ref 1.6–2.6)
MCHC RBC-ENTMCNC: 30 PG — SIGNIFICANT CHANGE UP (ref 27–34)
MCHC RBC-ENTMCNC: 30.7 GM/DL — LOW (ref 32–36)
MCV RBC AUTO: 97.7 FL — SIGNIFICANT CHANGE UP (ref 80–100)
NRBC # BLD: 0 /100 WBCS — SIGNIFICANT CHANGE UP (ref 0–0)
PHOSPHATE SERPL-MCNC: 2.8 MG/DL — SIGNIFICANT CHANGE UP (ref 2.5–4.5)
PLATELET # BLD AUTO: 157 K/UL — SIGNIFICANT CHANGE UP (ref 150–400)
POTASSIUM SERPL-MCNC: 3.6 MMOL/L — SIGNIFICANT CHANGE UP (ref 3.5–5.3)
POTASSIUM SERPL-SCNC: 3.6 MMOL/L — SIGNIFICANT CHANGE UP (ref 3.5–5.3)
RBC # BLD: 3.43 M/UL — LOW (ref 3.8–5.2)
RBC # FLD: 15.3 % — HIGH (ref 10.3–14.5)
SODIUM SERPL-SCNC: 145 MMOL/L — SIGNIFICANT CHANGE UP (ref 135–145)
WBC # BLD: 9.03 K/UL — SIGNIFICANT CHANGE UP (ref 3.8–10.5)
WBC # FLD AUTO: 9.03 K/UL — SIGNIFICANT CHANGE UP (ref 3.8–10.5)

## 2021-06-12 PROCEDURE — 99231 SBSQ HOSP IP/OBS SF/LOW 25: CPT | Mod: GC

## 2021-06-12 PROCEDURE — 99232 SBSQ HOSP IP/OBS MODERATE 35: CPT | Mod: GC

## 2021-06-12 RX ORDER — POTASSIUM PHOSPHATE, MONOBASIC POTASSIUM PHOSPHATE, DIBASIC 236; 224 MG/ML; MG/ML
15 INJECTION, SOLUTION INTRAVENOUS ONCE
Refills: 0 | Status: COMPLETED | OUTPATIENT
Start: 2021-06-12 | End: 2021-06-12

## 2021-06-12 RX ORDER — POTASSIUM CHLORIDE 20 MEQ
40 PACKET (EA) ORAL ONCE
Refills: 0 | Status: COMPLETED | OUTPATIENT
Start: 2021-06-12 | End: 2021-06-12

## 2021-06-12 RX ADMIN — Medication 650 MILLIGRAM(S): at 17:18

## 2021-06-12 RX ADMIN — HEPARIN SODIUM 5000 UNIT(S): 5000 INJECTION INTRAVENOUS; SUBCUTANEOUS at 21:08

## 2021-06-12 RX ADMIN — Medication 5 MILLIGRAM(S): at 06:02

## 2021-06-12 RX ADMIN — Medication 88 MICROGRAM(S): at 06:11

## 2021-06-12 RX ADMIN — Medication 12.5 MILLIGRAM(S): at 06:03

## 2021-06-12 RX ADMIN — Medication 3 MILLILITER(S): at 22:00

## 2021-06-12 RX ADMIN — PANTOPRAZOLE SODIUM 40 MILLIGRAM(S): 20 TABLET, DELAYED RELEASE ORAL at 06:02

## 2021-06-12 RX ADMIN — Medication 40 MILLIEQUIVALENT(S): at 17:18

## 2021-06-12 RX ADMIN — Medication 3 MILLILITER(S): at 16:54

## 2021-06-12 RX ADMIN — POTASSIUM PHOSPHATE, MONOBASIC POTASSIUM PHOSPHATE, DIBASIC 62.5 MILLIMOLE(S): 236; 224 INJECTION, SOLUTION INTRAVENOUS at 18:48

## 2021-06-12 RX ADMIN — ATORVASTATIN CALCIUM 40 MILLIGRAM(S): 80 TABLET, FILM COATED ORAL at 21:08

## 2021-06-12 RX ADMIN — HEPARIN SODIUM 5000 UNIT(S): 5000 INJECTION INTRAVENOUS; SUBCUTANEOUS at 06:02

## 2021-06-12 RX ADMIN — Medication 650 MILLIGRAM(S): at 18:18

## 2021-06-12 RX ADMIN — PANTOPRAZOLE SODIUM 40 MILLIGRAM(S): 20 TABLET, DELAYED RELEASE ORAL at 17:14

## 2021-06-12 RX ADMIN — Medication 3 MILLILITER(S): at 05:50

## 2021-06-12 RX ADMIN — HEPARIN SODIUM 5000 UNIT(S): 5000 INJECTION INTRAVENOUS; SUBCUTANEOUS at 14:06

## 2021-06-12 RX ADMIN — Medication 3 MILLILITER(S): at 11:13

## 2021-06-12 NOTE — DISCHARGE NOTE PROVIDER - CARE PROVIDER_API CALL
Tarah Liao  GASTROENTEROLOGY  [address], [address] [place] [address]  Phone: [phone]  Fax: [phone]  Follow Up Time: 1 week    Patsy Dumont  SURGERY  [address], [place] [address]  Phone: [phone]  Fax: [phone]  Follow Up Time: 1 week

## 2021-06-12 NOTE — PROGRESS NOTE ADULT - SUBJECTIVE AND OBJECTIVE BOX
SUBJECTIVE:   Patient seen and examined bedside by chief resident.  HDS  Denies N/V/CP/SOB  Denies abd pain   Voiding    heparin   Injectable 5000 Unit(s) SubCutaneous every 8 hours  metoprolol succinate ER 12.5 milliGRAM(s) Oral daily    MEDICATIONS  (PRN):  acetaminophen    Suspension .. 650 milliGRAM(s) Oral every 6 hours PRN Temp greater or equal to 38C (100.4F), Moderate Pain (4 - 6)  ALBUTerol    90 MICROgram(s) HFA Inhaler 2 Puff(s) Inhalation every 6 hours PRN Shortness of Breath      I&O's Detail    11 Jun 2021 07:01  -  12 Jun 2021 07:00  --------------------------------------------------------  IN:    IV PiggyBack: 499.8 mL    Oral Fluid: 400 mL  Total IN: 899.8 mL    OUT:    Indwelling Catheter - Urethral (mL): 175 mL    Voided (mL): 2350 mL  Total OUT: 2525 mL    Total NET: -1625.2 mL      12 Jun 2021 07:01  -  12 Jun 2021 12:57  --------------------------------------------------------  IN:  Total IN: 0 mL    OUT:    Voided (mL): 1350 mL  Total OUT: 1350 mL    Total NET: -1350 mL          Vital Signs Last 24 Hrs  T(C): 36.3 (12 Jun 2021 10:15), Max: 37.3 (11 Jun 2021 22:01)  T(F): 97.3 (12 Jun 2021 10:15), Max: 99.1 (11 Jun 2021 22:01)  HR: 84 (12 Jun 2021 12:00) (76 - 98)  BP: 139/76 (12 Jun 2021 12:00) (130/69 - 166/81)  BP(mean): 99 (12 Jun 2021 12:00) (92 - 115)  RR: 18 (12 Jun 2021 12:00) (12 - 19)  SpO2: 94% (12 Jun 2021 12:00) (94% - 100%)    General: NAD, resting comfortably in bed  C/V: NSR  Pulm: Nonlabored breathing, no respiratory distress  Abd: soft, NT/ND  Extrem: WWP, no edema, SCDs in place    LABS:                        10.3   9.03  )-----------( 157      ( 12 Jun 2021 09:20 )             33.5     06-12    145  |  114<H>  |  7   ----------------------------<  92  3.6   |  20<L>  |  0.72    Ca    7.4<L>      12 Jun 2021 09:20  Phos  2.8     06-12  Mg     2.0     06-12            RADIOLOGY & ADDITIONAL STUDIES:

## 2021-06-12 NOTE — DISCHARGE NOTE PROVIDER - CARE PROVIDERS DIRECT ADDRESSES
[email or web address]

## 2021-06-12 NOTE — DISCHARGE NOTE PROVIDER - PROVIDER TOKENS
PROVIDER:[TOKEN:[[id number]],FOLLOWUP:[1 week]],PROVIDER:[TOKEN:[[id number]],FOLLOWUP:[1 week]]

## 2021-06-12 NOTE — PROGRESS NOTE ADULT - ASSESSMENT
89F PMH HTN, HLD, DM, hypothyroidism, CAD (s/p CHETAN mRCA in remote [facility]), moderate AS, chronic diastolic CHF, Asthma/COPD (last hospitalization years ago, denies intubations), PMR (on MTX and Prednisone), osteoarthritis, h/o diverticulitis and LGIB (previously admission 9/2020). Pt presented to [facility] on 6/8 with 1 day Hx of bloody stools and hypotensive SBP 80s, responsive to IVF and she was transferred to [facility] SICU for hemodynamic monitoring in the setting of acute lower GI bleed. Now s/p 3units PRBCs, EGD/C-scope showing GIST and extensive diverticulosis without active bleed.     Pain+nausea control  Regular diet  Duonebs prn for asthma  PPI 40 bid  home meds as appropriate  polymyalgia rheumatica 5mg prednisone  SCD/SQH  Home PT with family assistance and aide  possible dc if home pt set up

## 2021-06-12 NOTE — PROGRESS NOTE ADULT - SUBJECTIVE AND OBJECTIVE BOX
INTERVAL HPI/OVERNIGHT EVENTS:  Awake and worried about going home  Otherwise stable      MEDICATIONS  (STANDING):  albuterol/ipratropium for Nebulization 3 milliLiter(s) Nebulizer every 6 hours  atorvastatin 40 milliGRAM(s) Oral at bedtime  dextrose 40% Gel 15 Gram(s) Oral once  dextrose 5%. 1000 milliLiter(s) (50 mL/Hr) IV Continuous <Continuous>  dextrose 5%. 1000 milliLiter(s) (100 mL/Hr) IV Continuous <Continuous>  dextrose 50% Injectable 25 Gram(s) IV Push once  dextrose 50% Injectable 12.5 Gram(s) IV Push once  glucagon  Injectable 1 milliGRAM(s) IntraMuscular once  heparin   Injectable 5000 Unit(s) SubCutaneous every 8 hours  insulin lispro (ADMELOG) corrective regimen sliding scale   SubCutaneous every 6 hours  levothyroxine 88 MICROGram(s) Oral daily  metoprolol succinate ER 12.5 milliGRAM(s) Oral daily  pantoprazole  Injectable 40 milliGRAM(s) IV Push two times a day  potassium chloride    Tablet ER 40 milliEquivalent(s) Oral once  potassium phosphate IVPB 15 milliMole(s) IV Intermittent once  predniSONE   Tablet 5 milliGRAM(s) Oral daily    MEDICATIONS  (PRN):  acetaminophen    Suspension .. 650 milliGRAM(s) Oral every 6 hours PRN Temp greater or equal to 38C (100.4F), Moderate Pain (4 - 6)  ALBUTerol    90 MICROgram(s) HFA Inhaler 2 Puff(s) Inhalation every 6 hours PRN Shortness of Breath      Allergies    Fosamax (Angioedema)  lisinopril (Angioedema)  sulfa drugs (Hives)  tetanus immune globulin (Hives)    Intolerances        Vital Signs Last 24 Hrs  T(C): 36.3 (12 Jun 2021 10:15), Max: 37.3 (11 Jun 2021 22:01)  T(F): 97.3 (12 Jun 2021 10:15), Max: 99.1 (11 Jun 2021 22:01)  HR: 90 (12 Jun 2021 08:11) (76 - 98)  BP: 147/72 (12 Jun 2021 08:11) (130/69 - 166/81)  BP(mean): 102 (12 Jun 2021 08:11) (92 - 115)  RR: 18 (12 Jun 2021 08:11) (12 - 19)  SpO2: 96% (12 Jun 2021 08:11) (94% - 100%)          Constitutional: Awake    Eyes: RELL    ENMT: Negative    Neck: Supple    Back:  no tenderness     Respiratory:  clear    Cardiovascular: S1 S2  murmur    Gastrointestinal:  soft    Genitourinary:    Extremities:  no edema    Vascular:    Neurological:    Skin:    Lymph Nodes:            06-11 @ 07:01  -  06-12 @ 07:00  --------------------------------------------------------  IN: 899.8 mL / OUT: 2525 mL / NET: -1625.2 mL    06-12 @ 07:01  -  06-12 @ 12:02  --------------------------------------------------------  IN: 0 mL / OUT: 1050 mL / NET: -1050 mL      LABS:                        10.3   9.03  )-----------( 157      ( 12 Jun 2021 09:20 )             33.5     06-12    145  |  114<H>  |  7   ----------------------------<  92  3.6   |  20<L>  |  0.72    Ca    7.4<L>      12 Jun 2021 09:20  Phos  2.8     06-12  Mg     2.0     06-12            RADIOLOGY & ADDITIONAL TESTS:

## 2021-06-12 NOTE — DISCHARGE NOTE PROVIDER - HOSPITAL COURSE
Miss Hernandez is a 89F [facility] HTN, HLD, DM, hypothyroidism, CAD (s/p CHETAN mRCA in remote [facility]), moderate AS, chronic diastolic CHF, Asthma/COPD (last hospitalization years ago, denies intubations), PMR (on MTX and Prednisone), osteoarthritis, h/o diverticulitis and LGIB (previously admission 9/2020). Pt presented to [facility] on 6/8 with 1 day Hx of bloody stools and hypotensive SBP 80s, responsive to IVF and she was transferred to [facility] SICU for hemodynamic monitoring in the setting of acute lower GI bleed.       Miss Hernandez is a 89F [facility] HTN, HLD, DM, hypothyroidism, CAD (s/p CHETAN mRCA in remote [facility]), moderate AS, chronic diastolic CHF, Asthma/COPD (last hospitalization years ago, denies intubations), PMR (on MTX and Prednisone), osteoarthritis, h/o diverticulitis and LGIB (previously admission 9/2020). Pt presented to [facility] on 6/8 with 1 day Hx of bloody stools and hypotensive SBP 80s, responsive to IVF and she was transferred to [facility] SICU for hemodynamic monitoring in the setting of acute lower GI bleed.      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 She was hemodynamically monitored in the SICU and Telemonitored bed, and received 3 units of blood overall.     Her Hemoglobin was stable for the last two days of her stay, she had not had a bloody bowel mogvement for 3 days, and was having normal bowel function on discharge.

## 2021-06-12 NOTE — DISCHARGE NOTE PROVIDER - NSDCACTIVITY_GEN_ALL_CORE
No restrictions/Return to Work/School allowed/Bathing allowed/Sex allowed/Do not drive or operate machinery/Showering allowed/Do not make important decisions/Stairs allowed/Driving allowed/Walking - Indoors allowed/No heavy lifting/straining/Walking - Outdoors allowed

## 2021-06-12 NOTE — DISCHARGE NOTE PROVIDER - NSDCFUADDINST_GEN_ALL_CORE_FT
Please hold off on taking your Aspirin in setting of bleeding, until you see Dr Dumont in his office on follow up.     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     General Discharge Instructions:  Please resume all regular home medications unless specifically advised not to take a particular medication. Also, please take any new medications as prescribed.  Please get plenty of rest, continue to ambulate several times per day, and drink adequate amounts of fluids. Avoid lifting weights greater than 5-10 lbs until you follow-up with your surgeon, who will instruct you further regarding activity restrictions.  Avoid driving or operating heavy machinery while taking pain medications.  Please follow-up with your surgeon and Primary Care Provider (PCP) as advised.    Warning Signs:  Please call your doctor or nurse practitioner if you experience the following:  *You experience new chest pain, pressure, squeezing or tightness.  *New or worsening cough, shortness of breath, or wheeze.  *If you are vomiting and cannot keep down fluids or your medications.  *You are getting dehydrated due to continued vomiting, diarrhea, or other reasons. Signs of dehydration include dry mouth, rapid heartbeat, or feeling dizzy or faint when standing.  *You see blood or dark/black material when you vomit or have a bowel movement.  *You experience burning when you urinate, have blood in your urine, or experience a discharge.  *Your pain is not improving within 8-12 hours or is not gone within 24 hours. Call or return immediately if your pain is getting worse, changes location, or moves to your chest or back.  *You have shaking chills, or fever greater than 101.5 degrees Fahrenheit or 38 degrees Celsius.  *Any change in your symptoms, or any new symptoms that concern you.

## 2021-06-12 NOTE — DISCHARGE NOTE NURSING/CASE MANAGEMENT/SOCIAL WORK - PATIENT PORTAL LINK FT
You can access the FollowMyHealth Patient Portal offered by [facility] by registering at the following website: [email or web address]. By joining [facility]’s FollowMyHealth portal, you will also be able to view your health information using other applications (apps) compatible with our system.

## 2021-06-12 NOTE — DISCHARGE NOTE PROVIDER - NSDCMRMEDTOKEN_GEN_ALL_CORE_FT
amLODIPine 5 mg oral tablet: 1 tab(s) orally once a day  aspirin 325 mg oral tablet: 1 tab(s) orally every 4 hours  Benadryl 25 mg oral capsule: 1 cap(s) orally 3 times a day, As Needed   gabapentin 400 mg oral capsule: orally once a day  ibuprofen 600 mg oral tablet: 1 tab(s) orally every 6 hours, As Needed -for moderate pain   Lasix 20 mg oral tablet: 1 tab(s) orally once a day  meloxicam 7.5 mg oral tablet: 1 tab(s) orally once a day  methotrexate 2.5 mg oral tablet: 1  orally 4 times a week  Norvasc 5 mg oral tablet: 1 tab(s) orally once a day  Pepcid 20 mg oral tablet: 1 tab(s) orally 2 times a day   predniSONE: 7 milligram(s) orally once a day  Synthroid 88 mcg (0.088 mg) oral tablet: 1 tab(s) orally once a day  Tylenol 325 mg oral tablet: 2 tab(s) orally 3 times a day, As Needed -for agitation - for moderate pain    amLODIPine 5 mg oral tablet: 1 tab(s) orally once a day  Benadryl 25 mg oral capsule: 1 cap(s) orally 3 times a day, As Needed   gabapentin 400 mg oral capsule: orally once a day  methotrexate 2.5 mg oral tablet: 1  orally 4 times a week  Norvasc 5 mg oral tablet: 1 tab(s) orally once a day  Pepcid 20 mg oral tablet: 1 tab(s) orally 2 times a day   predniSONE: 7 milligram(s) orally once a day  Synthroid 88 mcg (0.088 mg) oral tablet: 1 tab(s) orally once a day   amLODIPine 5 mg oral tablet: 1 tab(s) orally once a day  Benadryl 25 mg oral capsule: 1 cap(s) orally 3 times a day, As Needed   gabapentin 400 mg oral capsule: orally once a day  methotrexate 2.5 mg oral tablet: 1  orally 4 times a week  Norvasc 5 mg oral tablet: 1 tab(s) orally once a day  Pepcid 20 mg oral tablet: 1 tab(s) orally 2 times a day   predniSONE: 7 milligram(s) orally once a day  simethicone 80 mg oral tablet, chewable: 1 tab(s) orally once  Synthroid 88 mcg (0.088 mg) oral tablet: 1 tab(s) orally once a day

## 2021-06-13 VITALS
RESPIRATION RATE: 18 BRPM | SYSTOLIC BLOOD PRESSURE: 129 MMHG | DIASTOLIC BLOOD PRESSURE: 69 MMHG | HEART RATE: 92 BPM | OXYGEN SATURATION: 96 %

## 2021-06-13 LAB
ANION GAP SERPL CALC-SCNC: 9 MMOL/L — SIGNIFICANT CHANGE UP (ref 5–17)
BUN SERPL-MCNC: 5 MG/DL — LOW (ref 7–23)
CALCIUM SERPL-MCNC: 7.6 MG/DL — LOW (ref 8.4–10.5)
CHLORIDE SERPL-SCNC: 106 MMOL/L — SIGNIFICANT CHANGE UP (ref 96–108)
CK SERPL-CCNC: 437 U/L — HIGH (ref 25–170)
CO2 SERPL-SCNC: 29 MMOL/L — SIGNIFICANT CHANGE UP (ref 22–31)
CREAT SERPL-MCNC: 0.75 MG/DL — SIGNIFICANT CHANGE UP (ref 0.5–1.3)
GLUCOSE BLDC GLUCOMTR-MCNC: 134 MG/DL — HIGH (ref 70–99)
GLUCOSE BLDC GLUCOMTR-MCNC: 87 MG/DL — SIGNIFICANT CHANGE UP (ref 70–99)
GLUCOSE SERPL-MCNC: 95 MG/DL — SIGNIFICANT CHANGE UP (ref 70–99)
HCT VFR BLD CALC: 31.9 % — LOW (ref 34.5–45)
HGB BLD-MCNC: 10.3 G/DL — LOW (ref 11.5–15.5)
MAGNESIUM SERPL-MCNC: 1.8 MG/DL — SIGNIFICANT CHANGE UP (ref 1.6–2.6)
MCHC RBC-ENTMCNC: 29.9 PG — SIGNIFICANT CHANGE UP (ref 27–34)
MCHC RBC-ENTMCNC: 32.3 GM/DL — SIGNIFICANT CHANGE UP (ref 32–36)
MCV RBC AUTO: 92.7 FL — SIGNIFICANT CHANGE UP (ref 80–100)
NRBC # BLD: 0 /100 WBCS — SIGNIFICANT CHANGE UP (ref 0–0)
PHOSPHATE SERPL-MCNC: 3.2 MG/DL — SIGNIFICANT CHANGE UP (ref 2.5–4.5)
PLATELET # BLD AUTO: 163 K/UL — SIGNIFICANT CHANGE UP (ref 150–400)
POTASSIUM SERPL-MCNC: 3.1 MMOL/L — LOW (ref 3.5–5.3)
POTASSIUM SERPL-SCNC: 3.1 MMOL/L — LOW (ref 3.5–5.3)
RBC # BLD: 3.44 M/UL — LOW (ref 3.8–5.2)
RBC # FLD: 14.6 % — HIGH (ref 10.3–14.5)
SODIUM SERPL-SCNC: 144 MMOL/L — SIGNIFICANT CHANGE UP (ref 135–145)
WBC # BLD: 7.96 K/UL — SIGNIFICANT CHANGE UP (ref 3.8–10.5)
WBC # FLD AUTO: 7.96 K/UL — SIGNIFICANT CHANGE UP (ref 3.8–10.5)

## 2021-06-13 PROCEDURE — 86900 BLOOD TYPING SEROLOGIC ABO: CPT

## 2021-06-13 PROCEDURE — 99285 EMERGENCY DEPT VISIT HI MDM: CPT | Mod: 25

## 2021-06-13 PROCEDURE — 87040 BLOOD CULTURE FOR BACTERIA: CPT

## 2021-06-13 PROCEDURE — 82040 ASSAY OF SERUM ALBUMIN: CPT

## 2021-06-13 PROCEDURE — 81001 URINALYSIS AUTO W/SCOPE: CPT

## 2021-06-13 PROCEDURE — 96360 HYDRATION IV INFUSION INIT: CPT

## 2021-06-13 PROCEDURE — 94640 AIRWAY INHALATION TREATMENT: CPT

## 2021-06-13 PROCEDURE — 86769 SARS-COV-2 COVID-19 ANTIBODY: CPT

## 2021-06-13 PROCEDURE — 85027 COMPLETE CBC AUTOMATED: CPT

## 2021-06-13 PROCEDURE — 87635 SARS-COV-2 COVID-19 AMP PRB: CPT

## 2021-06-13 PROCEDURE — 84484 ASSAY OF TROPONIN QUANT: CPT

## 2021-06-13 PROCEDURE — 85730 THROMBOPLASTIN TIME PARTIAL: CPT

## 2021-06-13 PROCEDURE — 83605 ASSAY OF LACTIC ACID: CPT

## 2021-06-13 PROCEDURE — 83036 HEMOGLOBIN GLYCOSYLATED A1C: CPT

## 2021-06-13 PROCEDURE — 85610 PROTHROMBIN TIME: CPT

## 2021-06-13 PROCEDURE — 93005 ELECTROCARDIOGRAM TRACING: CPT

## 2021-06-13 PROCEDURE — 36430 TRANSFUSION BLD/BLD COMPNT: CPT

## 2021-06-13 PROCEDURE — 83735 ASSAY OF MAGNESIUM: CPT

## 2021-06-13 PROCEDURE — P9037: CPT

## 2021-06-13 PROCEDURE — 82553 CREATINE MB FRACTION: CPT

## 2021-06-13 PROCEDURE — 97161 PT EVAL LOW COMPLEX 20 MIN: CPT

## 2021-06-13 PROCEDURE — 36415 COLL VENOUS BLD VENIPUNCTURE: CPT

## 2021-06-13 PROCEDURE — 86920 COMPATIBILITY TEST SPIN: CPT

## 2021-06-13 PROCEDURE — 80048 BASIC METABOLIC PNL TOTAL CA: CPT

## 2021-06-13 PROCEDURE — 93306 TTE W/DOPPLER COMPLETE: CPT

## 2021-06-13 PROCEDURE — 82962 GLUCOSE BLOOD TEST: CPT

## 2021-06-13 PROCEDURE — 86850 RBC ANTIBODY SCREEN: CPT

## 2021-06-13 PROCEDURE — 96361 HYDRATE IV INFUSION ADD-ON: CPT

## 2021-06-13 PROCEDURE — 83690 ASSAY OF LIPASE: CPT

## 2021-06-13 PROCEDURE — P9016: CPT

## 2021-06-13 PROCEDURE — 80053 COMPREHEN METABOLIC PANEL: CPT

## 2021-06-13 PROCEDURE — 71045 X-RAY EXAM CHEST 1 VIEW: CPT

## 2021-06-13 PROCEDURE — 86901 BLOOD TYPING SEROLOGIC RH(D): CPT

## 2021-06-13 PROCEDURE — 85025 COMPLETE CBC W/AUTO DIFF WBC: CPT

## 2021-06-13 PROCEDURE — 84100 ASSAY OF PHOSPHORUS: CPT

## 2021-06-13 PROCEDURE — 86923 COMPATIBILITY TEST ELECTRIC: CPT

## 2021-06-13 PROCEDURE — 82550 ASSAY OF CK (CPK): CPT

## 2021-06-13 PROCEDURE — 74174 CTA ABD&PLVS W/CONTRAST: CPT

## 2021-06-13 RX ORDER — MELOXICAM 15 MG/1
1 TABLET ORAL
Qty: 0 | Refills: 0 | DISCHARGE

## 2021-06-13 RX ORDER — ASPIRIN/CALCIUM CARB/MAGNESIUM 324 MG
1 TABLET ORAL
Qty: 0 | Refills: 0 | DISCHARGE

## 2021-06-13 RX ORDER — SIMETHICONE 80 MG/1
80 TABLET, CHEWABLE ORAL ONCE
Refills: 0 | Status: COMPLETED | OUTPATIENT
Start: 2021-06-13 | End: 2021-06-13

## 2021-06-13 RX ORDER — SIMETHICONE 80 MG/1
1 TABLET, CHEWABLE ORAL
Qty: 0 | Refills: 0 | DISCHARGE
Start: 2021-06-13

## 2021-06-13 RX ORDER — FUROSEMIDE 40 MG
1 TABLET ORAL
Qty: 0 | Refills: 0 | DISCHARGE

## 2021-06-13 RX ADMIN — Medication 5 MILLIGRAM(S): at 05:26

## 2021-06-13 RX ADMIN — Medication 88 MICROGRAM(S): at 05:26

## 2021-06-13 RX ADMIN — PANTOPRAZOLE SODIUM 40 MILLIGRAM(S): 20 TABLET, DELAYED RELEASE ORAL at 05:26

## 2021-06-13 RX ADMIN — Medication 3 MILLILITER(S): at 11:30

## 2021-06-13 RX ADMIN — SIMETHICONE 80 MILLIGRAM(S): 80 TABLET, CHEWABLE ORAL at 14:15

## 2021-06-13 RX ADMIN — Medication 3 MILLILITER(S): at 05:26

## 2021-06-13 RX ADMIN — Medication 12.5 MILLIGRAM(S): at 05:26

## 2021-06-13 RX ADMIN — HEPARIN SODIUM 5000 UNIT(S): 5000 INJECTION INTRAVENOUS; SUBCUTANEOUS at 14:45

## 2021-06-13 RX ADMIN — Medication 650 MILLIGRAM(S): at 14:15

## 2021-06-13 RX ADMIN — HEPARIN SODIUM 5000 UNIT(S): 5000 INJECTION INTRAVENOUS; SUBCUTANEOUS at 05:26

## 2021-06-13 NOTE — PROGRESS NOTE ADULT - ASSESSMENT
89F [facility] HTN, HLD, DM, hypothyroidism, CAD (s/p CHETAN mRCA in remote [facility]), moderate AS, chronic diastolic CHF, Asthma/COPD (last hospitalization years ago, denies intubations), PMR (on MTX and Prednisone), osteoarthritis, h/o diverticulitis and LGIB (previously admission 9/2020). Pt presented to [facility] on 6/8 with 1 day Hx of bloody stools and hypotensive SBP 80s, responsive to IVF and she was transferred to [facility] SICU for hemodynamic monitoring in the setting of acute lower GI bleed. Now s/p 3units PRBCs, EGD/C-scope showing GIST and extensive diverticulosis without active bleed.     PPI 40 bid  d/c home with Home PT with family assistance and aide

## 2021-06-13 NOTE — PROGRESS NOTE ADULT - SUBJECTIVE AND OBJECTIVE BOX
SUBJECTIVE: Patient seen and examined bedside by chief resident, feels well, tolerates a regular diet and had a normal bowel movement earlier this AM. No complain of dizziness/SOB/CP/LOC.     heparin   Injectable 5000 Unit(s) SubCutaneous every 8 hours  metoprolol succinate ER 12.5 milliGRAM(s) Oral daily    MEDICATIONS  (PRN):  acetaminophen    Suspension .. 650 milliGRAM(s) Oral every 6 hours PRN Temp greater or equal to 38C (100.4F), Moderate Pain (4 - 6)  ALBUTerol    90 MICROgram(s) HFA Inhaler 2 Puff(s) Inhalation every 6 hours PRN Shortness of Breath    I&O's Detail    12 Jun 2021 07:01  -  13 Jun 2021 07:00  --------------------------------------------------------  IN:  Total IN: 0 mL    OUT:    Voided (mL): 3600 mL  Total OUT: 3600 mL    Total NET: -3600 mL      13 Jun 2021 07:01  -  13 Jun 2021 12:46  --------------------------------------------------------  IN:  Total IN: 0 mL    OUT:    Voided (mL): 800 mL  Total OUT: 800 mL    Total NET: -800 mL    Vital Signs Last 24 Hrs  T(C): 36.8 (13 Jun 2021 09:00), Max: 37.6 (12 Jun 2021 21:22)  T(F): 98.2 (13 Jun 2021 09:00), Max: 99.6 (12 Jun 2021 21:22)  HR: 68 (13 Jun 2021 11:38) (68 - 84)  BP: 137/72 (13 Jun 2021 11:38) (136/66 - 158/87)  BP(mean): 99 (13 Jun 2021 11:38) (95 - 117)  RR: 18 (13 Jun 2021 11:38) (18 - 18)  SpO2: 99% (13 Jun 2021 11:38) (95% - 99%)    GENERAL: NAD, Resting comfortably in bed, awake, opens eyes spontaneously  HEENT: NCAT, MMM, Normal conjunctiva, PERRL  RESP: Nonlabored breathing, No respiratory distress  CARD: Normal rate, Normal peripheral perfusion  GI: Soft, ND, NT, No guarding, No rebound tenderness  EXTREM: WWP, No edema, No gross deformity of extremities  SKIN: No rashes, no lesions  NEURO: AAOx3, No focal motor or sensory deficits  PSYCH: Affect and characteristics of appearance, verbalizations, and behaviors are appropriate    LABS:                        10.3   7.96  )-----------( 163      ( 13 Jun 2021 08:07 )             31.9     06-13    144  |  106  |  5<L>  ----------------------------<  95  3.1<L>   |  29  |  0.75    Ca    7.6<L>      13 Jun 2021 08:06  Phos  3.2     06-13  Mg     1.8     06-13    Culture - Blood (collected 06-09-21 @ 00:19)  Source: .Blood Blood  Preliminary Report (06-13-21 @ 01:00):    No growth at 4 days.

## 2021-06-13 NOTE — PROGRESS NOTE ADULT - NUTRITIONAL ASSESSMENT
This patient has been assessed with a concern for Malnutrition and has been determined to have a diagnosis/diagnoses of Moderate protein-calorie malnutrition.    This patient is being managed with:   Diet Regular-  Low Sodium  Entered: Jun 11 2021  7:58AM    

## 2021-06-13 NOTE — PROGRESS NOTE ADULT - PROVIDER SPECIALTY LIST ADULT
Cardiology
SICU
Surgery
Gastroenterology
Gastroenterology
SICU
Surgery
Surgery
SICU
Surgery
Internal Medicine

## 2021-06-13 NOTE — PROGRESS NOTE ADULT - NSICDXPILOT_GEN_ALL_CORE
[place]

## 2021-06-13 NOTE — PROGRESS NOTE ADULT - ATTENDING COMMENTS
Abdomen soft, tolerating diet, no more bloody BM'S, H/H stable, D/C home with F/U with GI as outpatient.

## 2021-06-14 LAB
CULTURE RESULTS: SIGNIFICANT CHANGE UP
SPECIMEN SOURCE: SIGNIFICANT CHANGE UP

## 2021-06-22 DIAGNOSIS — K29.70 GASTRITIS, UNSPECIFIED, WITHOUT BLEEDING: ICD-10-CM

## 2021-06-22 DIAGNOSIS — I50.32 CHRONIC DIASTOLIC (CONGESTIVE) HEART FAILURE: ICD-10-CM

## 2021-06-22 DIAGNOSIS — N13.30 UNSPECIFIED HYDRONEPHROSIS: ICD-10-CM

## 2021-06-22 DIAGNOSIS — K57.31 DIVERTICULOSIS OF LARGE INTESTINE WITHOUT PERFORATION OR ABSCESS WITH BLEEDING: ICD-10-CM

## 2021-06-22 DIAGNOSIS — Z88.2 ALLERGY STATUS TO SULFONAMIDES: ICD-10-CM

## 2021-06-22 DIAGNOSIS — I35.0 NONRHEUMATIC AORTIC (VALVE) STENOSIS: ICD-10-CM

## 2021-06-22 DIAGNOSIS — E11.9 TYPE 2 DIABETES MELLITUS WITHOUT COMPLICATIONS: ICD-10-CM

## 2021-06-22 DIAGNOSIS — Z90.710 ACQUIRED ABSENCE OF BOTH CERVIX AND UTERUS: ICD-10-CM

## 2021-06-22 DIAGNOSIS — E44.0 MODERATE PROTEIN-CALORIE MALNUTRITION: ICD-10-CM

## 2021-06-22 DIAGNOSIS — E78.5 HYPERLIPIDEMIA, UNSPECIFIED: ICD-10-CM

## 2021-06-22 DIAGNOSIS — D62 ACUTE POSTHEMORRHAGIC ANEMIA: ICD-10-CM

## 2021-06-22 DIAGNOSIS — J44.9 CHRONIC OBSTRUCTIVE PULMONARY DISEASE, UNSPECIFIED: ICD-10-CM

## 2021-06-22 DIAGNOSIS — M35.3 POLYMYALGIA RHEUMATICA: ICD-10-CM

## 2021-06-22 DIAGNOSIS — I11.0 HYPERTENSIVE HEART DISEASE WITH HEART FAILURE: ICD-10-CM

## 2021-06-22 DIAGNOSIS — L76.31 POSTPROCEDURAL HEMATOMA OF SKIN AND SUBCUTANEOUS TISSUE FOLLOWING A DERMATOLOGIC PROCEDURE: ICD-10-CM

## 2021-06-22 DIAGNOSIS — R57.8 OTHER SHOCK: ICD-10-CM

## 2021-06-22 DIAGNOSIS — I25.10 ATHEROSCLEROTIC HEART DISEASE OF NATIVE CORONARY ARTERY WITHOUT ANGINA PECTORIS: ICD-10-CM

## 2021-06-22 DIAGNOSIS — R10.9 UNSPECIFIED ABDOMINAL PAIN: ICD-10-CM

## 2021-06-22 DIAGNOSIS — C49.A2 GASTROINTESTINAL STROMAL TUMOR OF STOMACH: ICD-10-CM

## 2021-06-22 DIAGNOSIS — N13.2 HYDRONEPHROSIS WITH RENAL AND URETERAL CALCULOUS OBSTRUCTION: ICD-10-CM

## 2021-07-20 ENCOUNTER — APPOINTMENT (OUTPATIENT)
Dept: HEART AND VASCULAR | Facility: CLINIC | Age: 86
End: 2021-07-20
Payer: MEDICARE

## 2021-07-20 ENCOUNTER — LABORATORY RESULT (OUTPATIENT)
Age: 86
End: 2021-07-20

## 2021-07-20 VITALS
WEIGHT: 122.38 LBS | TEMPERATURE: 96.1 F | HEIGHT: 59 IN | BODY MASS INDEX: 24.67 KG/M2 | DIASTOLIC BLOOD PRESSURE: 68 MMHG | OXYGEN SATURATION: 95 % | RESPIRATION RATE: 14 BRPM | HEART RATE: 87 BPM | SYSTOLIC BLOOD PRESSURE: 120 MMHG

## 2021-07-20 DIAGNOSIS — E03.9 HYPOTHYROIDISM, UNSPECIFIED: ICD-10-CM

## 2021-07-20 DIAGNOSIS — E11.9 TYPE 2 DIABETES MELLITUS W/OUT COMPLICATIONS: ICD-10-CM

## 2021-07-20 DIAGNOSIS — R60.9 EDEMA, UNSPECIFIED: ICD-10-CM

## 2021-07-20 DIAGNOSIS — I25.10 ATHEROSCLEROTIC HEART DISEASE OF NATIVE CORONARY ARTERY W/OUT ANGINA PECTORIS: ICD-10-CM

## 2021-07-20 DIAGNOSIS — I44.0 ATRIOVENTRICULAR BLOCK, FIRST DEGREE: ICD-10-CM

## 2021-07-20 PROCEDURE — 99214 OFFICE O/P EST MOD 30 MIN: CPT

## 2021-07-20 PROCEDURE — 36415 COLL VENOUS BLD VENIPUNCTURE: CPT

## 2021-07-20 PROCEDURE — 93000 ELECTROCARDIOGRAM COMPLETE: CPT

## 2021-07-24 NOTE — ASSESSMENT
[FreeTextEntry1] : Moderate aortic stenosis\par \par mRCA stenosis \par \par chronic lower extremity edema \par \par 1st degree AVB\par \par Recent GI bleeding requiring transfusion

## 2021-07-24 NOTE — DISCUSSION/SUMMARY
[Moderate Aortic Stenosis] : moderate aortic stenosis [Outpatient Evaluation] : outpatient evaluation [BNP] : B-type natriuretic peptide [Echocardiogram] : echocardiogram [First Degree A-V Block] : first degree AV block [With Me] : with me [___ Month(s)] : in [unfilled] month(s) [FreeTextEntry1] : venipuncture with BNP, A1c, lipids, iron studies\par \par advised  compression stockings   2-30 mmHg  \par \par as she has no angina  or clinical evidence of worsening  CHF ,  angioplasty is not indicated at this time. \par \par return for echocardiogram to assess progression of aortic stenosis

## 2021-07-24 NOTE — REVIEW OF SYSTEMS
[Feeling Fatigued] : feeling fatigued [Weight Loss (___ Lbs)] : [unfilled] ~Ulb weight loss [Dyspnea on exertion] : dyspnea during exertion [Lower Ext Edema] : lower extremity edema [Negative] : Heme/Lymph

## 2021-07-24 NOTE — PHYSICAL EXAM
[Well Developed] : well developed [Well Nourished] : well nourished [No Acute Distress] : no acute distress [Normal Conjunctiva] : normal conjunctiva [No Xanthelasma] : no xanthelasma [Normal Venous Pressure] : normal venous pressure [No Carotid Bruit] : no carotid bruit [Normal S1, S2] : normal S1, S2 [No Rub] : no rub [No Gallop] : no gallop [Clear Lung Fields] : clear lung fields [Good Air Entry] : good air entry [No Respiratory Distress] : no respiratory distress  [Soft] : abdomen soft [Non Tender] : non-tender [No Masses/organomegaly] : no masses/organomegaly [Normal Bowel Sounds] : normal bowel sounds [Abnormal Gait] : abnormal gait [No Cyanosis] : no cyanosis [No Clubbing] : no clubbing [No Varicosities] : no varicosities [Edema ___] : edema [unfilled] [No Rash] : no rash [No Skin Lesions] : no skin lesions [Moves all extremities] : moves all extremities [No Focal Deficits] : no focal deficits [Normal Speech] : normal speech [Alert and Oriented] : alert and oriented [Normal memory] : normal memory [de-identified] : anicteric  [de-identified] : 2/6 sm base  [de-identified] : walks with walker

## 2021-07-24 NOTE — REASON FOR VISIT
[Symptom and Test Evaluation] : symptom and test evaluation [Cardiac Failure] : cardiac failure [Family Member] : family member [FreeTextEntry1] : 89   year old -American female with increasing dyspnea on exertion, dyspnea  and fatigue, as far back as March has past hx of  multiple syncopal episodes. \par \par She has moderate aortic stenosis identified by echocardiogram  with normal LV function  and  tight mRCA stenosis identified prior to Covid epidemic

## 2021-07-24 NOTE — HISTORY OF PRESENT ILLNESS
[FreeTextEntry1] : Patient reports  she received complete Moderna covid vaccine x 2 in April \par \par Marked lower extremity edema  and fatigue \par \par Denies chest pain but is limited in activity\par \par EKG shows NSR  1st degree AVB, 77 bpm  with poor R wave progression  without ectopy  , acute ischemia or LVH \par \par Denies recent syncope, cough, fevers, bleeding \par \par Recent admission for GI bleeding. She required transfusion  2 to 3 units according to family member . She denies recurrent bleeding, abdominal pain, nausea, vomiting or diarrhea .  NSAIDs have been discontinued \par \par She had been taking aspirin and meloxicam

## 2021-07-25 LAB
ALBUMIN SERPL ELPH-MCNC: 4.1 G/DL
ALP BLD-CCNC: 70 U/L
ALT SERPL-CCNC: 13 U/L
ANION GAP SERPL CALC-SCNC: 13 MMOL/L
APPEARANCE: ABNORMAL
AST SERPL-CCNC: 21 U/L
BASOPHILS # BLD AUTO: 0.04 K/UL
BASOPHILS NFR BLD AUTO: 0.6 %
BILIRUB SERPL-MCNC: 0.4 MG/DL
BILIRUBIN URINE: NEGATIVE
BLOOD URINE: NEGATIVE
BUN SERPL-MCNC: 11 MG/DL
CALCIUM SERPL-MCNC: 9.1 MG/DL
CHLORIDE SERPL-SCNC: 103 MMOL/L
CHOLEST SERPL-MCNC: 185 MG/DL
CO2 SERPL-SCNC: 24 MMOL/L
COLOR: YELLOW
CREAT SERPL-MCNC: 0.84 MG/DL
CREAT SPEC-SCNC: 44 MG/DL
EOSINOPHIL # BLD AUTO: 0.11 K/UL
EOSINOPHIL NFR BLD AUTO: 1.6 %
ESTIMATED AVERAGE GLUCOSE: 100 MG/DL
FERRITIN SERPL-MCNC: 93 NG/ML
GLUCOSE QUALITATIVE U: NEGATIVE
GLUCOSE SERPL-MCNC: 96 MG/DL
HBA1C MFR BLD HPLC: 5.1 %
HCT VFR BLD CALC: 34.1 %
HDLC SERPL-MCNC: 84 MG/DL
HGB BLD-MCNC: 10.2 G/DL
IMM GRANULOCYTES NFR BLD AUTO: 0.4 %
IRON SATN MFR SERPL: 24 %
IRON SERPL-MCNC: 62 UG/DL
KETONES URINE: NEGATIVE
LDLC SERPL CALC-MCNC: 84 MG/DL
LEUKOCYTE ESTERASE URINE: ABNORMAL
LYMPHOCYTES # BLD AUTO: 1.42 K/UL
LYMPHOCYTES NFR BLD AUTO: 20.3 %
MAN DIFF?: NORMAL
MCHC RBC-ENTMCNC: 29.9 GM/DL
MCHC RBC-ENTMCNC: 30.5 PG
MCV RBC AUTO: 102.1 FL
MICROALBUMIN 24H UR DL<=1MG/L-MCNC: <1.2 MG/DL
MICROALBUMIN/CREAT 24H UR-RTO: NORMAL MG/G
MONOCYTES # BLD AUTO: 0.58 K/UL
MONOCYTES NFR BLD AUTO: 8.3 %
NEUTROPHILS # BLD AUTO: 4.82 K/UL
NEUTROPHILS NFR BLD AUTO: 68.8 %
NITRITE URINE: NEGATIVE
NONHDLC SERPL-MCNC: 101 MG/DL
NT-PROBNP SERPL-MCNC: 236 PG/ML
PH URINE: 5.5
PLATELET # BLD AUTO: NORMAL K/UL
POTASSIUM SERPL-SCNC: 3.9 MMOL/L
PROT SERPL-MCNC: 7.3 G/DL
PROTEIN URINE: NEGATIVE
RBC # BLD: 3.34 M/UL
RBC # FLD: 15.4 %
SODIUM SERPL-SCNC: 140 MMOL/L
SPECIFIC GRAVITY URINE: 1
TIBC SERPL-MCNC: 256 UG/DL
TRIGL SERPL-MCNC: 82 MG/DL
TSH SERPL-ACNC: 0.55 UIU/ML
UIBC SERPL-MCNC: 194 UG/DL
UROBILINOGEN URINE: NORMAL
WBC # FLD AUTO: 7 K/UL

## 2021-09-01 NOTE — ED ADULT NURSE NOTE - NS PRO AD PATIENT TYPE
Pt has an allergic reaction to IV iodine. Per Dr. Janice Brar CT Abdomen Pelvis no contrast order placed.  Called pt LMOM
Caitlyn Erwin [phone]/Health Care Proxy (HCP)

## 2021-10-05 NOTE — ED ADULT TRIAGE NOTE - SPO2 (%)
Price (Do Not Change): 0.00
Instructions: This plan will send the code FBSD to the PM system.  DO NOT or CHANGE the price.
Detail Level: Generalized
97

## 2022-01-19 ENCOUNTER — APPOINTMENT (OUTPATIENT)
Dept: HEART AND VASCULAR | Facility: CLINIC | Age: 87
End: 2022-01-19

## 2022-02-09 NOTE — PROGRESS NOTE ADULT - ATTENDING COMMENTS
Patient seen and examined with Dr. Cevallos. Doing well overall. Agree with assessment and plan. Birth Control Pills Pregnancy And Lactation Text: This medication should be avoided if pregnant and for the first 30 days post-partum.

## 2022-03-07 ENCOUNTER — APPOINTMENT (OUTPATIENT)
Dept: HEART AND VASCULAR | Facility: CLINIC | Age: 87
End: 2022-03-07

## 2022-03-07 ENCOUNTER — APPOINTMENT (OUTPATIENT)
Dept: HEART AND VASCULAR | Facility: CLINIC | Age: 87
End: 2022-03-07
Payer: MEDICARE

## 2022-03-07 VITALS
OXYGEN SATURATION: 95 % | BODY MASS INDEX: 22.78 KG/M2 | DIASTOLIC BLOOD PRESSURE: 73 MMHG | HEIGHT: 59 IN | WEIGHT: 113 LBS | TEMPERATURE: 97.2 F | SYSTOLIC BLOOD PRESSURE: 120 MMHG | HEART RATE: 73 BPM

## 2022-03-07 DIAGNOSIS — R42 DIZZINESS AND GIDDINESS: ICD-10-CM

## 2022-03-07 PROCEDURE — 93000 ELECTROCARDIOGRAM COMPLETE: CPT

## 2022-03-07 PROCEDURE — 99213 OFFICE O/P EST LOW 20 MIN: CPT

## 2022-03-07 PROCEDURE — 93306 TTE W/DOPPLER COMPLETE: CPT

## 2022-03-08 PROBLEM — R42 DIZZINESS: Status: ACTIVE | Noted: 2022-03-08

## 2022-03-08 PROBLEM — R42 LIGHTHEADEDNESS: Status: ACTIVE | Noted: 2022-03-08

## 2022-03-08 NOTE — ASSESSMENT
[FreeTextEntry1] : Hx of upper GI bleeding with polypoid mass of the stomach \par \par 1st degree AVB\par \par Aortic stenosis \par \par Severe mid RCA stenosis \par \par

## 2022-03-08 NOTE — REASON FOR VISIT
[Symptom and Test Evaluation] : symptom and test evaluation [Structural Heart and Valve Disease] : structural heart and valve disease [Coronary Artery Disease] : coronary artery disease [Formal Caregiver] : formal caregiver [FreeTextEntry1] : 89   year old -American female with increasing dyspnea on exertion,   far back as March has past hx of  multiple vasovagal  syncopal episodes. \par \par She has moderate aortic stenosis identified by echocardiogram  with normal LV function  and  severe  mRCA stenosis identified prior to Covid epidemic \par \par \par She is statin intolerant

## 2022-03-08 NOTE — REVIEW OF SYSTEMS
[Feeling Fatigued] : feeling fatigued [Weight Loss (___ Lbs)] : [unfilled] ~Ulb weight loss [Dyspnea on exertion] : dyspnea during exertion [Lower Ext Edema] : lower extremity edema [Dizziness] : dizziness [Negative] : Heme/Lymph

## 2022-03-08 NOTE — PHYSICAL EXAM
[Well Developed] : well developed [Well Nourished] : well nourished [No Acute Distress] : no acute distress [Normal Conjunctiva] : normal conjunctiva [No Xanthelasma] : no xanthelasma [No Carotid Bruit] : no carotid bruit [Normal Venous Pressure] : normal venous pressure [Normal S1, S2] : normal S1, S2 [No Rub] : no rub [No Gallop] : no gallop [Clear Lung Fields] : clear lung fields [Good Air Entry] : good air entry [No Respiratory Distress] : no respiratory distress  [Soft] : abdomen soft [Non Tender] : non-tender [No Masses/organomegaly] : no masses/organomegaly [Normal Bowel Sounds] : normal bowel sounds [Abnormal Gait] : abnormal gait [No Cyanosis] : no cyanosis [No Clubbing] : no clubbing [No Varicosities] : no varicosities [Edema ___] : edema [unfilled] [No Rash] : no rash [Moves all extremities] : moves all extremities [No Skin Lesions] : no skin lesions [No Focal Deficits] : no focal deficits [Normal Speech] : normal speech [Alert and Oriented] : alert and oriented [Normal memory] : normal memory [de-identified] : anicteric  [de-identified] : 2/6 sm base  [de-identified] : walks with walker

## 2022-03-08 NOTE — HISTORY OF PRESENT ILLNESS
[FreeTextEntry1] : Patient was fully vaccinated against covid but developed covid infection in January 2022. \par \par I June 2021, she was admitted to [facility] for GI bleeding and found to have a polypoid mass of the stomach \par \par She has been increasingly tired  and c/o dizziness and dyspnea  but denies recent syncope, or chest pain \par \par She is up to date with flu vaccine as well \par \par Last echocardiogram was in 2020  \par \par Denies bleeding, fevers, cough\par \par EKG shows  NSR 67 bpm with 1st degree AVB  and poor R wave progression  with nonspecific T wave abnormality  and QTc of 431msec \par \par She reports that she had recent blood work with her new internist\par \par

## 2022-03-08 NOTE — DISCUSSION/SUMMARY
[Moderate Aortic Stenosis] : moderate aortic stenosis [Outpatient Evaluation] : outpatient evaluation [Echocardiogram] : echocardiogram [First Degree A-V Block] : first degree AV block [Coronary Artery Disease] : coronary artery disease [Stable] : stable [With Me] : with me [de-identified] : 10 day Bio-Tel heart monitor  [FreeTextEntry1] : Echocardiogram results reviewed with patient and caretaker : stable moderate aortic stenosis \par \par Placed Bio-Tel 10 day patch for arrhythmia detection\par \par Obtain outside lab results

## 2022-03-18 ENCOUNTER — APPOINTMENT (OUTPATIENT)
Dept: RADIOLOGY | Facility: CLINIC | Age: 87
End: 2022-03-18

## 2022-03-18 ENCOUNTER — OUTPATIENT (OUTPATIENT)
Dept: OUTPATIENT SERVICES | Facility: HOSPITAL | Age: 87
LOS: 1 days | End: 2022-03-18
Payer: MEDICARE

## 2022-03-18 DIAGNOSIS — Z98.890 OTHER SPECIFIED POSTPROCEDURAL STATES: Chronic | ICD-10-CM

## 2022-03-18 PROCEDURE — 73060 X-RAY EXAM OF HUMERUS: CPT | Mod: 26,LT

## 2022-03-26 ENCOUNTER — NON-APPOINTMENT (OUTPATIENT)
Age: 87
End: 2022-03-26

## 2022-03-26 DIAGNOSIS — I44.30 UNSPECIFIED ATRIOVENTRICULAR BLOCK: ICD-10-CM

## 2022-03-26 DIAGNOSIS — R00.2 PALPITATIONS: ICD-10-CM

## 2022-03-31 NOTE — ED PROVIDER NOTE - CARE PLAN
Patient ID:  is a 63 year old female.    Chief Complaint   Patient presents with   • Office Visit   • Follow-up     wants to discuss certain concerns     HPI Accompanied by her . She is having GERD at night. He has tried alkaseltzer, but not sure what else to do.  She is having more difficulty with ADL's & needs a home health assessment. She weighed 132# in 2020, no down to 121#.  Her sister wrote notes on issues the thinks are prominent. Saw Dr. Weiss (referred by Dr. Gomes), who diagnosed her with rosacea & gave rx metrogel.  They would like a refill.  She is still going to PT at [facility] after arthroscopic repair of L torn rotator cuff & manipulation under anesthesia for frozen shoulder by Dr. Hsu.        Current Outpatient Medications   Medication Sig Dispense Refill   • levothyroxine 50 MCG tablet Take 1 tablet by mouth daily. 90 tablet 3   • omeprazole (PriLOSEC OTC) 20 MG tablet Take 1 tablet by mouth daily. Before breakfast. 100 tablet 3   • metroNIDAZOLE (METROGEL) 1 % gel Apply topically daily. 60 g 5     No current facility-administered medications for this visit.       Past Medical History:   Diagnosis Date   • Dementia (CMS/HCC)     FRONTAL TEMPORAL DEMENTIA    • Frontotemporal dementia (CMS/HCC)    • Hypercholesterolemia    • Hypothyroidism    • Rotator cuff tear, left        Past Surgical History:   Procedure Laterality Date   • Dilation and curettage     • Knee surgery Right     arthroscopy       Family History   Problem Relation Age of Onset   • Coronary Artery Disease Mother    • Hypothyroid Mother        Social History     Socioeconomic History   • Marital status: /Civil Union     Spouse name: Not on file   • Number of children: Not on file   • Years of education: Not on file   • Highest education level: Not on file   Occupational History   • Not on file   Tobacco Use   • Smoking status: Never Smoker   • Smokeless tobacco: Never Used   Vaping Use   • Vaping Use: never used    Substance and Sexual Activity   • Alcohol use: Yes     Comment: SOCIALLY    • Drug use: Never   • Sexual activity: Not on file   Other Topics Concern   • Not on file   Social History Narrative   • Not on file     Social Determinants of Health     Financial Resource Strain: Not on file   Food Insecurity: Not on file   Transportation Needs: Not on file   Physical Activity: Not on file   Stress: Not on file   Social Connections: Not on file   Intimate Partner Violence: Not on file       ALLERGIES:  No Known Allergies    Review of Systems:  Review of Systems   Constitutional: Negative.    HENT: Negative.    Eyes: Negative.    Respiratory: Negative.    Cardiovascular: Negative.    Gastrointestinal: Negative.    Endocrine: Negative.    Genitourinary: Negative.    Musculoskeletal: Negative.    Skin: Negative.    Allergic/Immunologic: Negative.    Neurological: Negative.    Hematological: Negative.    Psychiatric/Behavioral: Positive for confusion, decreased concentration, dysphoric mood and sleep disturbance.       Physical:  Visit Vitals  /80 (BP Location: LUE - Left upper extremity, Patient Position: Sitting, Cuff Size: Regular)   Pulse 75   Temp 97.8 °F (36.6 °C) (Tympanic)   Resp 15   Ht 5' 1\" (1.549 m)   Wt 54.9 kg (121 lb 0.5 oz)   SpO2 99%   BMI 22.87 kg/m²     Physical Exam  Constitutional:       Appearance: Normal appearance. She is normal weight.   HENT:      Head: Normocephalic and atraumatic.      Mouth/Throat:      Mouth: Mucous membranes are moist.   Eyes:      Extraocular Movements: Extraocular movements intact.      Conjunctiva/sclera: Conjunctivae normal.      Pupils: Pupils are equal, round, and reactive to light.   Cardiovascular:      Rate and Rhythm: Normal rate and regular rhythm.      Heart sounds: Normal heart sounds.   Pulmonary:      Effort: Pulmonary effort is normal.      Breath sounds: Normal breath sounds.   Abdominal:      General: Bowel sounds are normal.   Musculoskeletal:       Comments: L shoulder abduction limited to ~ 110 degrees   Skin:     General: Skin is warm and dry.      Findings: No rash.   Neurological:      Mental Status: She is alert. She is disoriented.   Psychiatric:         Mood and Affect: Affect is blunt and flat.         Behavior: Behavior is slowed and withdrawn.         Cognition and Memory: Cognition is impaired. Memory is impaired.      Comments: No initiation of verbal communication, responds to questions but paucity of speech           Symone was seen today for office visit and follow-up.    Diagnoses and all orders for this visit:    Frontotemporal dementia (CMS/HCC)  Comments:  Home health assessment. Considering palliative care.   to help with services.  Orders:  -     SERVICE TO HOME CARE    Gastroesophageal reflux disease, unspecified whether esophagitis present  Comments:  Prilosec OTC daily before breakfast. GERD handout    Rosacea  Comments:  Continue metrogel once a day    Other orders  -     omeprazole (PriLOSEC OTC) 20 MG tablet; Take 1 tablet by mouth daily. Before breakfast.  -     metroNIDAZOLE (METROGEL) 1 % gel; Apply topically daily.      Patient Active Problem List   Diagnosis   • Dense breast tissue on mammogram   • Follicular acne   • Hypercholesterolemia   • Hyperglycemia   • Hypothyroidism   • Vertigo, benign positional   • Visit for screening mammogram   • Frontotemporal dementia (CMS/HCC)   • Encounter for health maintenance examination   • Flu vaccine need               Izabela Groves MD  03/31/22         Principal Discharge DX:	Angioedema

## 2022-04-05 PROBLEM — I44.30 AV BLOCK: Status: ACTIVE | Noted: 2021-07-24

## 2022-04-29 ENCOUNTER — INPATIENT (INPATIENT)
Facility: HOSPITAL | Age: 87
LOS: 3 days | Discharge: HOME HEALTH | DRG: 247 | End: 2022-05-03
Attending: INTERNAL MEDICINE | Admitting: INTERNAL MEDICINE
Payer: MEDICARE

## 2022-04-29 VITALS
HEART RATE: 78 BPM | TEMPERATURE: 98 F | HEIGHT: 60 IN | DIASTOLIC BLOOD PRESSURE: 66 MMHG | RESPIRATION RATE: 18 BRPM | SYSTOLIC BLOOD PRESSURE: 133 MMHG | OXYGEN SATURATION: 97 % | WEIGHT: 121.92 LBS

## 2022-04-29 DIAGNOSIS — E78.5 HYPERLIPIDEMIA, UNSPECIFIED: ICD-10-CM

## 2022-04-29 DIAGNOSIS — R06.00 DYSPNEA, UNSPECIFIED: ICD-10-CM

## 2022-04-29 DIAGNOSIS — J44.9 CHRONIC OBSTRUCTIVE PULMONARY DISEASE, UNSPECIFIED: ICD-10-CM

## 2022-04-29 DIAGNOSIS — Z98.890 OTHER SPECIFIED POSTPROCEDURAL STATES: Chronic | ICD-10-CM

## 2022-04-29 DIAGNOSIS — Z87.09 PERSONAL HISTORY OF OTHER DISEASES OF THE RESPIRATORY SYSTEM: ICD-10-CM

## 2022-04-29 DIAGNOSIS — I50.32 CHRONIC DIASTOLIC (CONGESTIVE) HEART FAILURE: ICD-10-CM

## 2022-04-29 DIAGNOSIS — E03.9 HYPOTHYROIDISM, UNSPECIFIED: ICD-10-CM

## 2022-04-29 DIAGNOSIS — I10 ESSENTIAL (PRIMARY) HYPERTENSION: ICD-10-CM

## 2022-04-29 DIAGNOSIS — Z87.19 PERSONAL HISTORY OF OTHER DISEASES OF THE DIGESTIVE SYSTEM: ICD-10-CM

## 2022-04-29 DIAGNOSIS — E11.9 TYPE 2 DIABETES MELLITUS WITHOUT COMPLICATIONS: ICD-10-CM

## 2022-04-29 DIAGNOSIS — N39.0 URINARY TRACT INFECTION, SITE NOT SPECIFIED: ICD-10-CM

## 2022-04-29 DIAGNOSIS — M35.3 POLYMYALGIA RHEUMATICA: ICD-10-CM

## 2022-04-29 LAB
ALBUMIN SERPL ELPH-MCNC: 3.7 G/DL — SIGNIFICANT CHANGE UP (ref 3.3–5)
ALP SERPL-CCNC: 55 U/L — SIGNIFICANT CHANGE UP (ref 40–120)
ALT FLD-CCNC: 15 U/L — SIGNIFICANT CHANGE UP (ref 10–45)
ANION GAP SERPL CALC-SCNC: 13 MMOL/L — SIGNIFICANT CHANGE UP (ref 5–17)
APTT BLD: 26.4 SEC — LOW (ref 27.5–35.5)
AST SERPL-CCNC: 19 U/L — SIGNIFICANT CHANGE UP (ref 10–40)
BASOPHILS # BLD AUTO: 0.01 K/UL — SIGNIFICANT CHANGE UP (ref 0–0.2)
BASOPHILS NFR BLD AUTO: 0.2 % — SIGNIFICANT CHANGE UP (ref 0–2)
BILIRUB SERPL-MCNC: 0.4 MG/DL — SIGNIFICANT CHANGE UP (ref 0.2–1.2)
BLD GP AB SCN SERPL QL: NEGATIVE — SIGNIFICANT CHANGE UP
BUN SERPL-MCNC: 14 MG/DL — SIGNIFICANT CHANGE UP (ref 7–23)
CALCIUM SERPL-MCNC: 8.2 MG/DL — LOW (ref 8.4–10.5)
CHLORIDE SERPL-SCNC: 108 MMOL/L — SIGNIFICANT CHANGE UP (ref 96–108)
CK MB CFR SERPL CALC: 1.2 NG/ML — SIGNIFICANT CHANGE UP (ref 0–6.7)
CK SERPL-CCNC: 115 U/L — SIGNIFICANT CHANGE UP (ref 25–170)
CO2 SERPL-SCNC: 23 MMOL/L — SIGNIFICANT CHANGE UP (ref 22–31)
CREAT SERPL-MCNC: 0.82 MG/DL — SIGNIFICANT CHANGE UP (ref 0.5–1.3)
EGFR: 66 ML/MIN/1.73M2 — SIGNIFICANT CHANGE UP
EOSINOPHIL # BLD AUTO: 0.15 K/UL — SIGNIFICANT CHANGE UP (ref 0–0.5)
EOSINOPHIL NFR BLD AUTO: 3 % — SIGNIFICANT CHANGE UP (ref 0–6)
GLUCOSE BLDC GLUCOMTR-MCNC: 114 MG/DL — HIGH (ref 70–99)
GLUCOSE SERPL-MCNC: 115 MG/DL — HIGH (ref 70–99)
HCT VFR BLD CALC: 31 % — LOW (ref 34.5–45)
HGB BLD-MCNC: 10 G/DL — LOW (ref 11.5–15.5)
IMM GRANULOCYTES NFR BLD AUTO: 0.2 % — SIGNIFICANT CHANGE UP (ref 0–1.5)
INR BLD: 1.09 — SIGNIFICANT CHANGE UP (ref 0.88–1.16)
LYMPHOCYTES # BLD AUTO: 1.3 K/UL — SIGNIFICANT CHANGE UP (ref 1–3.3)
LYMPHOCYTES # BLD AUTO: 25.6 % — SIGNIFICANT CHANGE UP (ref 13–44)
MCHC RBC-ENTMCNC: 30.2 PG — SIGNIFICANT CHANGE UP (ref 27–34)
MCHC RBC-ENTMCNC: 32.3 GM/DL — SIGNIFICANT CHANGE UP (ref 32–36)
MCV RBC AUTO: 93.7 FL — SIGNIFICANT CHANGE UP (ref 80–100)
MONOCYTES # BLD AUTO: 0.36 K/UL — SIGNIFICANT CHANGE UP (ref 0–0.9)
MONOCYTES NFR BLD AUTO: 7.1 % — SIGNIFICANT CHANGE UP (ref 2–14)
NEUTROPHILS # BLD AUTO: 3.24 K/UL — SIGNIFICANT CHANGE UP (ref 1.8–7.4)
NEUTROPHILS NFR BLD AUTO: 63.9 % — SIGNIFICANT CHANGE UP (ref 43–77)
NRBC # BLD: 0 /100 WBCS — SIGNIFICANT CHANGE UP (ref 0–0)
PLATELET # BLD AUTO: 197 K/UL — SIGNIFICANT CHANGE UP (ref 150–400)
POTASSIUM SERPL-MCNC: 3.6 MMOL/L — SIGNIFICANT CHANGE UP (ref 3.5–5.3)
POTASSIUM SERPL-SCNC: 3.6 MMOL/L — SIGNIFICANT CHANGE UP (ref 3.5–5.3)
PROT SERPL-MCNC: 6.9 G/DL — SIGNIFICANT CHANGE UP (ref 6–8.3)
PROTHROM AB SERPL-ACNC: 13 SEC — SIGNIFICANT CHANGE UP (ref 10.5–13.4)
RBC # BLD: 3.31 M/UL — LOW (ref 3.8–5.2)
RBC # FLD: 16.2 % — HIGH (ref 10.3–14.5)
RH IG SCN BLD-IMP: POSITIVE — SIGNIFICANT CHANGE UP
SARS-COV-2 RNA SPEC QL NAA+PROBE: NEGATIVE — SIGNIFICANT CHANGE UP
SODIUM SERPL-SCNC: 144 MMOL/L — SIGNIFICANT CHANGE UP (ref 135–145)
TROPONIN T SERPL-MCNC: 0.01 NG/ML — SIGNIFICANT CHANGE UP (ref 0–0.01)
WBC # BLD: 5.07 K/UL — SIGNIFICANT CHANGE UP (ref 3.8–10.5)
WBC # FLD AUTO: 5.07 K/UL — SIGNIFICANT CHANGE UP (ref 3.8–10.5)

## 2022-04-29 PROCEDURE — 99285 EMERGENCY DEPT VISIT HI MDM: CPT

## 2022-04-29 PROCEDURE — 93010 ELECTROCARDIOGRAM REPORT: CPT

## 2022-04-29 PROCEDURE — 71045 X-RAY EXAM CHEST 1 VIEW: CPT | Mod: 26

## 2022-04-29 RX ORDER — METHOTREXATE 2.5 MG/1
1 TABLET ORAL
Qty: 0 | Refills: 0 | DISCHARGE

## 2022-04-29 RX ORDER — GABAPENTIN 400 MG/1
0 CAPSULE ORAL
Qty: 0 | Refills: 0 | DISCHARGE

## 2022-04-29 RX ORDER — SODIUM CHLORIDE 9 MG/ML
1000 INJECTION, SOLUTION INTRAVENOUS
Refills: 0 | Status: DISCONTINUED | OUTPATIENT
Start: 2022-04-29 | End: 2022-04-30

## 2022-04-29 RX ORDER — DEXTROSE 50 % IN WATER 50 %
15 SYRINGE (ML) INTRAVENOUS ONCE
Refills: 0 | Status: DISCONTINUED | OUTPATIENT
Start: 2022-04-29 | End: 2022-04-30

## 2022-04-29 RX ORDER — FUROSEMIDE 40 MG
20 TABLET ORAL ONCE
Refills: 0 | Status: COMPLETED | OUTPATIENT
Start: 2022-04-29 | End: 2022-04-29

## 2022-04-29 RX ORDER — GLUCAGON INJECTION, SOLUTION 0.5 MG/.1ML
1 INJECTION, SOLUTION SUBCUTANEOUS ONCE
Refills: 0 | Status: DISCONTINUED | OUTPATIENT
Start: 2022-04-29 | End: 2022-05-03

## 2022-04-29 RX ORDER — AMLODIPINE BESYLATE 2.5 MG/1
5 TABLET ORAL AT BEDTIME
Refills: 0 | Status: DISCONTINUED | OUTPATIENT
Start: 2022-04-29 | End: 2022-05-03

## 2022-04-29 RX ORDER — OXYBUTYNIN CHLORIDE 5 MG
5 TABLET ORAL DAILY
Refills: 0 | Status: DISCONTINUED | OUTPATIENT
Start: 2022-04-29 | End: 2022-04-29

## 2022-04-29 RX ORDER — DEXTROSE 50 % IN WATER 50 %
12.5 SYRINGE (ML) INTRAVENOUS ONCE
Refills: 0 | Status: DISCONTINUED | OUTPATIENT
Start: 2022-04-29 | End: 2022-04-30

## 2022-04-29 RX ORDER — DEXTROSE 50 % IN WATER 50 %
25 SYRINGE (ML) INTRAVENOUS ONCE
Refills: 0 | Status: DISCONTINUED | OUTPATIENT
Start: 2022-04-29 | End: 2022-04-30

## 2022-04-29 RX ORDER — DEXTROSE 50 % IN WATER 50 %
25 SYRINGE (ML) INTRAVENOUS ONCE
Refills: 0 | Status: DISCONTINUED | OUTPATIENT
Start: 2022-04-29 | End: 2022-05-03

## 2022-04-29 RX ORDER — IPRATROPIUM/ALBUTEROL SULFATE 18-103MCG
3 AEROSOL WITH ADAPTER (GRAM) INHALATION EVERY 6 HOURS
Refills: 0 | Status: DISCONTINUED | OUTPATIENT
Start: 2022-04-29 | End: 2022-05-03

## 2022-04-29 RX ORDER — METHOTREXATE 2.5 MG/1
2.5 TABLET ORAL
Refills: 0 | Status: DISCONTINUED | OUTPATIENT
Start: 2022-05-04 | End: 2022-05-03

## 2022-04-29 RX ORDER — RALOXIFENE HYDROCHLORIDE 60 MG/1
60 TABLET, COATED ORAL DAILY
Refills: 0 | Status: DISCONTINUED | OUTPATIENT
Start: 2022-04-29 | End: 2022-05-03

## 2022-04-29 RX ORDER — AMLODIPINE BESYLATE 2.5 MG/1
1 TABLET ORAL
Qty: 0 | Refills: 0 | DISCHARGE

## 2022-04-29 RX ORDER — FUROSEMIDE 40 MG
20 TABLET ORAL DAILY
Refills: 0 | Status: DISCONTINUED | OUTPATIENT
Start: 2022-04-30 | End: 2022-05-03

## 2022-04-29 RX ORDER — OXYBUTYNIN CHLORIDE 5 MG
5 TABLET ORAL
Refills: 0 | Status: DISCONTINUED | OUTPATIENT
Start: 2022-04-29 | End: 2022-04-30

## 2022-04-29 RX ORDER — GABAPENTIN 400 MG/1
100 CAPSULE ORAL DAILY
Refills: 0 | Status: DISCONTINUED | OUTPATIENT
Start: 2022-04-29 | End: 2022-04-30

## 2022-04-29 RX ORDER — ENOXAPARIN SODIUM 100 MG/ML
40 INJECTION SUBCUTANEOUS EVERY 24 HOURS
Refills: 0 | Status: DISCONTINUED | OUTPATIENT
Start: 2022-04-29 | End: 2022-05-03

## 2022-04-29 RX ORDER — INSULIN LISPRO 100/ML
VIAL (ML) SUBCUTANEOUS
Refills: 0 | Status: DISCONTINUED | OUTPATIENT
Start: 2022-04-29 | End: 2022-04-30

## 2022-04-29 RX ORDER — LEVOTHYROXINE SODIUM 125 MCG
100 TABLET ORAL DAILY
Refills: 0 | Status: DISCONTINUED | OUTPATIENT
Start: 2022-04-29 | End: 2022-04-30

## 2022-04-29 RX ORDER — FOLIC ACID 0.8 MG
1 TABLET ORAL DAILY
Refills: 0 | Status: DISCONTINUED | OUTPATIENT
Start: 2022-04-29 | End: 2022-05-03

## 2022-04-29 RX ORDER — LEVOTHYROXINE SODIUM 125 MCG
1 TABLET ORAL
Qty: 0 | Refills: 0 | DISCHARGE

## 2022-04-29 RX ORDER — GABAPENTIN 400 MG/1
400 CAPSULE ORAL DAILY
Refills: 0 | Status: DISCONTINUED | OUTPATIENT
Start: 2022-04-29 | End: 2022-04-30

## 2022-04-29 RX ORDER — POTASSIUM CHLORIDE 20 MEQ
20 PACKET (EA) ORAL ONCE
Refills: 0 | Status: COMPLETED | OUTPATIENT
Start: 2022-04-29 | End: 2022-04-29

## 2022-04-29 RX ORDER — ALLOPURINOL 300 MG
100 TABLET ORAL DAILY
Refills: 0 | Status: DISCONTINUED | OUTPATIENT
Start: 2022-04-29 | End: 2022-04-30

## 2022-04-29 RX ADMIN — Medication 20 MILLIEQUIVALENT(S): at 19:47

## 2022-04-29 RX ADMIN — Medication 20 MILLIGRAM(S): at 20:50

## 2022-04-29 RX ADMIN — AMLODIPINE BESYLATE 5 MILLIGRAM(S): 2.5 TABLET ORAL at 23:16

## 2022-04-29 RX ADMIN — ENOXAPARIN SODIUM 40 MILLIGRAM(S): 100 INJECTION SUBCUTANEOUS at 20:50

## 2022-04-29 NOTE — PATIENT PROFILE ADULT - FALL HARM RISK - HARM RISK INTERVENTIONS

## 2022-04-29 NOTE — ED PROVIDER NOTE - OBJECTIVE STATEMENT
89 y/o f with pmh of htn, moderate aortic stenosis, mid RCA stenosis presents to ED with dyspnea on exertion x several weeks sent in by Dr. Mariscal.  As per Dr. Mariscal pt had angio two years prior showing mid RCA stenosis but could not have intervention at the time secondary to GI bleed.  Dr. Mariscal now believes pt needs intervention such as ballooning.  At rest, pt denies symptoms of chest pain or dyspnea.  No fever or infectious symptoms.  Slight increased swelling in ankles.

## 2022-04-29 NOTE — ED ADULT NURSE NOTE - INTERVENTIONS DEFINITIONS
Call bell, personal items and telephone within reach/Instruct patient to call for assistance/Non-slip footwear when patient is off stretcher/Physically safe environment: no spills, clutter or unnecessary equipment/Stretcher in lowest position, wheels locked, appropriate side rails in place/Provide visual cue, wrist band, yellow gown, etc./Monitor for mental status changes and reorient to person, place, and time/Review medications for side effects contributing to fall risk/Provide visual clues: red socks

## 2022-04-29 NOTE — H&P ADULT - PROBLEM SELECTOR PLAN 1
vasova -pt w/ hx dyspnea, acutely worsening w/ minimal exertion x past 7 weeks  -No SOB at rest, satting well on RA  -trop neg x 1, f/u 830 Katelin and am Katelin  -EKG sinus @ 75bpm w/ 1st degree av block and nonspecific STT changes  -CXR clear  -  -Pt w/ hx of HFpEF, w/ mild JVD and LE edema s/p lasix 20mg IVP x 1 in ED. no standing IV lasix at this time  -[facility] 9/2020 @ [facility] w/ severe midLAD dz  -tentative plan for [facility] Monday w/ Dr Saldana  -consented (in 5u office) and email sent to schedulers  -discuss case w/ IC team on monday regarding hx of GI bleed (H/H stable at this time)  -old note w/ concern for ASA allergy. Pt states no allergy and that she was told not to take it because of the GI bleed

## 2022-04-29 NOTE — H&P ADULT - PROBLEM SELECTOR PLAN 10
-not on home DM meds  -f/u a1c, discontinue ISS and fingersticks if A1C normal        VTE PPx: Lovenox  Dispo: pending [facility]    Lives at home w/ son and has HHA 5x weekly for 4h/day  walks w/ rollator/cane  f/u PT ashleighal

## 2022-04-29 NOTE — ED PROVIDER NOTE - CLINICAL SUMMARY MEDICAL DECISION MAKING FREE TEXT BOX
Pt with JOHNSON x several weeks, known aortic stenosis and mid RCA stenosis sent in by Dr. Mariscal for angiogram.  At rest and in ED pt asymptomatic, trop neg, ekg without changes, pt not in failure - discussed with Dr. Mariscal.  Admitted to service for angiogram and further management including echo.

## 2022-04-29 NOTE — H&P ADULT - HISTORY OF PRESENT ILLNESS
89 yo F, statin intolerance, w/ PMHx HTN, HLD, DM, known CAD (severe mRCA dz by [facility] before pandemic), moderate AS, ?HFpEF (EF____), hx GI bleed (June 2021 @ [facility], found to have stomach mass) hx multiple vasovagal episodes, hypothyroidism, Asthma/COPD (last hospitalization years ago, denies intubations), PMR (on MTX and Prednisone) presented to outpatient cardiologist, Dr Mariscal, c/o worsening dyspnea w/ associated dizziness and fatigue. Denies having fevers, chills, cough, CP, palpitations, n/v, hematochezia, melena, LE edema.  Pt currently asymptomatic. Pt was sent in to ED by Dr Mariscal for concern for unstable angina. In ED: Vitals: 98 F - 78bpm - RR18 - 133/66 - 97%. EKG: Sinus @ 75bpm w/ 1st degree HB. Nonspecific STT changes. Labs: trop neg x 1. H/H 10/31. K 3.6. . Trop neg x 1. CXR w/ no acute abnormalities. Pt given KCl 40meq x 1.     Pt now admitted to [facility] to r/o ACS.  89 yo F, statin intolerance, w/ PMHx HTN, HLD, DM, CAD (known severe mRCA dz), moderate AS, ?HFpEF (EF 60%), hx GI bleed (June 2021 @ [facility], found to have stomach mass) hx multiple vasovagal episodes, hypothyroidism, Asthma/COPD (last hospitalization years ago, denies intubations), PMR (on MTX and Prednisone) presented to outpatient cardiologist, Dr Mariscal, c/o worsening dyspnea on minimal exertion w/ associated dizziness and fatigue. Pt also endorses intermittent LE edema. Denies having fevers, chills, cough, CP, palpitations, n/v, hematochezia, melena.  Pt currently asymptomatic. Pt was sent in to ED by Dr Mariscal for concern for unstable angina. Of note - pt states she was started on augmentin for UTI on 4/28.  In ED: Vitals: 98 F - 78bpm - RR18 - 133/66 - 97%. EKG: Sinus @ 75bpm w/ 1st degree HB. Nonspecific STT changes. Labs: trop neg x 1. H/H 10/31. K 3.6. . Trop neg x 1. CXR w/ no acute abnormalities. Pt given KCl 40meq x 1 and lasix 20mg IVP x 1.    Pt now admitted to [facility] to r/o ACS.

## 2022-04-29 NOTE — H&P ADULT - PROBLEM SELECTOR PLAN 3
-hx GI bleed Jun 2021 @ [facility] requiring 3 units PRBCs  -s/p EGD at the time revealing stomach mass  -no intervention at that time  -Hgb in fall 2021 ~10  -Hgb today 10  -no signs of bleeding at this time  -active T&S  -consider GI eval for DAPT clearance

## 2022-04-29 NOTE — H&P ADULT - ASSESSMENT
89 yo F, statin intolerance, w/ PMHx HTN, HLD, DM, CAD (known severe mRCA dz), moderate AS, ?HFpEF (EF 60%), hx GI bleed (June 2021 @ [facility], found to have stomach mass) hx multiple vasovagal episodes, hypothyroidism, Asthma/COPD (last hospitalization years ago, denies intubations), PMR (on MTX and Prednisone) presented to outpatient cardiologist, Dr Mariscal, c/o worsening dyspnea on minimal exertion w/ associated dizziness and fatigue x last 7 weeks. Pt also endorses intermittent LE edema. Pt has known severe mRCA lesion as above, so was sent in to r/o ACS w/ plan for [facility] Monday w/ Dr Saldana.

## 2022-04-29 NOTE — H&P ADULT - PROBLEM SELECTOR PLAN 4
-pt reports acute UTI and prescribed augmentin 500-150 w/ unknown regimen on 4/28  -call pharmacy in am and order regimen as prescribed

## 2022-04-29 NOTE — PATIENT PROFILE ADULT - NSPROPASSIVESMOKEEXPOSURE_GEN_A_NUR
Normal Normal Irritable/Depressed Depressed/Angry/Irritable Normal Normal Depressed/Angry/Irritable Depressed/Irritable Irritable/Depressed Normal Normal Normal Normal Depressed/Irritable/Angry Angry/Depressed/Irritable Normal Normal Normal Unknown

## 2022-04-29 NOTE — H&P ADULT - PROBLEM SELECTOR PLAN 2
mild JVD, 1-2+ pitting edema in ankles. Otherwise euvolemic  -CXR clear  -  -EF 60%   -Echo 3/7/22 @ Dr Burton office w/ Normal LV/RV systolic fx LVEF 60%. Mild LVH. Unspecified severity of LV diastolic dysfunction. Moderate AS.   -s/p lasix 20mg IVP x 1 in ED  -c/w home lasix 20mg PO daily  -Core measures  -strict I&O's, daily weight, fluid restriction

## 2022-04-29 NOTE — ED ADULT NURSE NOTE - OBJECTIVE STATEMENT
89y Female sent by Dr. Mariscal (Cardiology), for admission and pre-op workup for stent placement. Pt denies CP, back pain, SOB, F/C, N/V/D, dizziness.

## 2022-04-29 NOTE — H&P ADULT - PROBLEM SELECTOR PROBLEM 7
Render Note In Bullet Format When Appropriate: No Show Applicator Variable?: Yes Post-Care Instructions: I reviewed with the patient in detail post-care instructions. Patient is to wear sunprotection, and avoid picking at any of the treated lesions. Pt may apply Vaseline to crusted or scabbing areas. Detail Level: Zone Number Of Freeze-Thaw Cycles: 2 freeze-thaw cycles Duration Of Freeze Thaw-Cycle (Seconds): 5 Consent: The patient's consent was obtained including but not limited to risks of crusting, scabbing, blistering, scarring, darker or lighter pigmentary change, recurrence, incomplete removal and infection. History of COPD Hyperlipidemia

## 2022-04-29 NOTE — ED ADULT TRIAGE NOTE - CHIEF COMPLAINT QUOTE
pt sent to ED by MD Mariscal cardiology for admission and stent placement. ekg done. pt denies chest pain.

## 2022-04-30 LAB
A1C WITH ESTIMATED AVERAGE GLUCOSE RESULT: 5.1 % — SIGNIFICANT CHANGE UP (ref 4–5.6)
ANION GAP SERPL CALC-SCNC: 10 MMOL/L — SIGNIFICANT CHANGE UP (ref 5–17)
APTT BLD: 26.3 SEC — LOW (ref 27.5–35.5)
BLD GP AB SCN SERPL QL: NEGATIVE — SIGNIFICANT CHANGE UP
BUN SERPL-MCNC: 12 MG/DL — SIGNIFICANT CHANGE UP (ref 7–23)
CALCIUM SERPL-MCNC: 7.8 MG/DL — LOW (ref 8.4–10.5)
CHLORIDE SERPL-SCNC: 109 MMOL/L — HIGH (ref 96–108)
CHOLEST SERPL-MCNC: 146 MG/DL — SIGNIFICANT CHANGE UP
CK MB CFR SERPL CALC: 1 NG/ML — SIGNIFICANT CHANGE UP (ref 0–6.7)
CK SERPL-CCNC: 105 U/L — SIGNIFICANT CHANGE UP (ref 25–170)
CO2 SERPL-SCNC: 23 MMOL/L — SIGNIFICANT CHANGE UP (ref 22–31)
CREAT SERPL-MCNC: 0.7 MG/DL — SIGNIFICANT CHANGE UP (ref 0.5–1.3)
EGFR: 80 ML/MIN/1.73M2 — SIGNIFICANT CHANGE UP
ESTIMATED AVERAGE GLUCOSE: 100 MG/DL — SIGNIFICANT CHANGE UP (ref 68–114)
GLUCOSE BLDC GLUCOMTR-MCNC: 103 MG/DL — HIGH (ref 70–99)
GLUCOSE BLDC GLUCOMTR-MCNC: 107 MG/DL — HIGH (ref 70–99)
GLUCOSE BLDC GLUCOMTR-MCNC: 119 MG/DL — HIGH (ref 70–99)
GLUCOSE BLDC GLUCOMTR-MCNC: 86 MG/DL — SIGNIFICANT CHANGE UP (ref 70–99)
GLUCOSE SERPL-MCNC: 84 MG/DL — SIGNIFICANT CHANGE UP (ref 70–99)
HCT VFR BLD CALC: 30.5 % — LOW (ref 34.5–45)
HDLC SERPL-MCNC: 62 MG/DL — SIGNIFICANT CHANGE UP
HGB BLD-MCNC: 9.9 G/DL — LOW (ref 11.5–15.5)
INR BLD: 1.17 — HIGH (ref 0.88–1.16)
LIPID PNL WITH DIRECT LDL SERPL: 74 MG/DL — SIGNIFICANT CHANGE UP
MAGNESIUM SERPL-MCNC: 2.1 MG/DL — SIGNIFICANT CHANGE UP (ref 1.6–2.6)
MCHC RBC-ENTMCNC: 30.7 PG — SIGNIFICANT CHANGE UP (ref 27–34)
MCHC RBC-ENTMCNC: 32.5 GM/DL — SIGNIFICANT CHANGE UP (ref 32–36)
MCV RBC AUTO: 94.4 FL — SIGNIFICANT CHANGE UP (ref 80–100)
NON HDL CHOLESTEROL: 84 MG/DL — SIGNIFICANT CHANGE UP
NRBC # BLD: 0 /100 WBCS — SIGNIFICANT CHANGE UP (ref 0–0)
PLATELET # BLD AUTO: 193 K/UL — SIGNIFICANT CHANGE UP (ref 150–400)
POTASSIUM SERPL-MCNC: 3.8 MMOL/L — SIGNIFICANT CHANGE UP (ref 3.5–5.3)
POTASSIUM SERPL-SCNC: 3.8 MMOL/L — SIGNIFICANT CHANGE UP (ref 3.5–5.3)
PROTHROM AB SERPL-ACNC: 14 SEC — HIGH (ref 10.5–13.4)
RBC # BLD: 3.23 M/UL — LOW (ref 3.8–5.2)
RBC # FLD: 15.9 % — HIGH (ref 10.3–14.5)
RH IG SCN BLD-IMP: POSITIVE — SIGNIFICANT CHANGE UP
SODIUM SERPL-SCNC: 142 MMOL/L — SIGNIFICANT CHANGE UP (ref 135–145)
T3 SERPL-MCNC: 74 NG/DL — LOW (ref 80–200)
T4 FREE SERPL-MCNC: 1.69 NG/DL — SIGNIFICANT CHANGE UP (ref 0.93–1.7)
TRIGL SERPL-MCNC: 48 MG/DL — SIGNIFICANT CHANGE UP
TROPONIN T SERPL-MCNC: 0.01 NG/ML — SIGNIFICANT CHANGE UP (ref 0–0.01)
TSH SERPL-MCNC: 0.25 UIU/ML — LOW (ref 0.27–4.2)
WBC # BLD: 5.54 K/UL — SIGNIFICANT CHANGE UP (ref 3.8–10.5)
WBC # FLD AUTO: 5.54 K/UL — SIGNIFICANT CHANGE UP (ref 3.8–10.5)

## 2022-04-30 PROCEDURE — 99222 1ST HOSP IP/OBS MODERATE 55: CPT

## 2022-04-30 PROCEDURE — 99223 1ST HOSP IP/OBS HIGH 75: CPT

## 2022-04-30 RX ORDER — OXYBUTYNIN CHLORIDE 5 MG
1 TABLET ORAL
Qty: 0 | Refills: 0 | DISCHARGE

## 2022-04-30 RX ORDER — LEVOTHYROXINE SODIUM 125 MCG
1 TABLET ORAL
Qty: 0 | Refills: 0 | DISCHARGE

## 2022-04-30 RX ORDER — GABAPENTIN 400 MG/1
1 CAPSULE ORAL
Qty: 0 | Refills: 0 | DISCHARGE

## 2022-04-30 RX ORDER — RALOXIFENE HYDROCHLORIDE 60 MG/1
1 TABLET, COATED ORAL
Qty: 0 | Refills: 0 | DISCHARGE

## 2022-04-30 RX ORDER — TIZANIDINE 4 MG/1
1 TABLET ORAL
Qty: 0 | Refills: 0 | DISCHARGE

## 2022-04-30 RX ORDER — LEVOTHYROXINE SODIUM 125 MCG
88 TABLET ORAL DAILY
Refills: 0 | Status: DISCONTINUED | OUTPATIENT
Start: 2022-04-30 | End: 2022-04-30

## 2022-04-30 RX ORDER — POTASSIUM CHLORIDE 20 MEQ
20 PACKET (EA) ORAL ONCE
Refills: 0 | Status: COMPLETED | OUTPATIENT
Start: 2022-04-30 | End: 2022-04-30

## 2022-04-30 RX ORDER — GABAPENTIN 400 MG/1
100 CAPSULE ORAL
Refills: 0 | Status: DISCONTINUED | OUTPATIENT
Start: 2022-04-30 | End: 2022-05-01

## 2022-04-30 RX ORDER — ALLOPURINOL 300 MG
1 TABLET ORAL
Qty: 0 | Refills: 0 | DISCHARGE

## 2022-04-30 RX ORDER — ALLOPURINOL 300 MG
100 TABLET ORAL DAILY
Refills: 0 | Status: DISCONTINUED | OUTPATIENT
Start: 2022-04-30 | End: 2022-05-03

## 2022-04-30 RX ORDER — GABAPENTIN 400 MG/1
400 CAPSULE ORAL AT BEDTIME
Refills: 0 | Status: DISCONTINUED | OUTPATIENT
Start: 2022-04-30 | End: 2022-05-03

## 2022-04-30 RX ORDER — FOLIC ACID 0.8 MG
1 TABLET ORAL
Qty: 0 | Refills: 0 | DISCHARGE

## 2022-04-30 RX ADMIN — ENOXAPARIN SODIUM 40 MILLIGRAM(S): 100 INJECTION SUBCUTANEOUS at 22:10

## 2022-04-30 RX ADMIN — Medication 1 MILLIGRAM(S): at 12:56

## 2022-04-30 RX ADMIN — GABAPENTIN 400 MILLIGRAM(S): 400 CAPSULE ORAL at 22:10

## 2022-04-30 RX ADMIN — GABAPENTIN 100 MILLIGRAM(S): 400 CAPSULE ORAL at 10:13

## 2022-04-30 RX ADMIN — Medication 5 MILLIGRAM(S): at 09:47

## 2022-04-30 RX ADMIN — GABAPENTIN 400 MILLIGRAM(S): 400 CAPSULE ORAL at 00:44

## 2022-04-30 RX ADMIN — RALOXIFENE HYDROCHLORIDE 60 MILLIGRAM(S): 60 TABLET, COATED ORAL at 12:56

## 2022-04-30 RX ADMIN — Medication 20 MILLIEQUIVALENT(S): at 09:48

## 2022-04-30 RX ADMIN — Medication 100 MICROGRAM(S): at 06:33

## 2022-04-30 RX ADMIN — Medication 2 MILLIGRAM(S): at 09:48

## 2022-04-30 RX ADMIN — Medication 100 MILLIGRAM(S): at 18:57

## 2022-04-30 RX ADMIN — AMLODIPINE BESYLATE 5 MILLIGRAM(S): 2.5 TABLET ORAL at 22:10

## 2022-04-30 RX ADMIN — Medication 100 MILLIGRAM(S): at 12:56

## 2022-04-30 RX ADMIN — GABAPENTIN 100 MILLIGRAM(S): 400 CAPSULE ORAL at 18:57

## 2022-04-30 RX ADMIN — Medication 20 MILLIGRAM(S): at 12:57

## 2022-04-30 RX ADMIN — Medication 1 TABLET(S): at 19:01

## 2022-04-30 NOTE — PROGRESS NOTE ADULT - PROBLEM SELECTOR PLAN 2
mild JVD, 1-2+ pitting edema in ankles. Otherwise euvolemic  - CXR clear;   - Echo 3/7/22 @ Dr Burton office w/ Normal LV/RV systolic fx LVEF 60%. Mild LVH. Unspecified severity of LV diastolic dysfunction. Moderate AS.   - Ss/p lasix 20mg IVP x 1 in ED  - C/w home lasix 20mg PO daily  - Core measures  - Strict I&O's, daily weight, fluid restriction mild JVD, 1-2+ pitting edema in ankles. Otherwise euvolemic  - CXR clear;   - Echo 3/7/22 @ Dr Burton office w/ Normal LV/RV systolic fx LVEF 60%. Mild LVH. Unspecified severity of LV diastolic dysfunction. Moderate AS.   - S/p lasix 20mg IVP x 1 in ED  - C/w home lasix 20mg PO daily  - Core measures, Strict I&O's, daily weight, fluid restriction

## 2022-04-30 NOTE — PROGRESS NOTE ADULT - SUBJECTIVE AND OBJECTIVE BOX
Interventional Cardiology PA Adult Progress Note    Subjective Assessment: Patient seen and examined at bedside. Patient stating her breathing feels better, improved.     ROS Negative except as per Subjective and HPI  	  MEDICATIONS:  amLODIPine   Tablet 5 milliGRAM(s) Oral at bedtime  furosemide    Tablet 20 milliGRAM(s) Oral daily  albuterol/ipratropium for Nebulization 3 milliLiter(s) Nebulizer every 6 hours PRN  gabapentin 100 milliGRAM(s) Oral <User Schedule>  gabapentin 400 milliGRAM(s) Oral at bedtime  allopurinol 100 milliGRAM(s) Oral daily  dextrose 50% Injectable 25 Gram(s) IV Push once  dextrose 50% Injectable 12.5 Gram(s) IV Push once  dextrose 50% Injectable 25 Gram(s) IV Push once  dextrose Oral Gel 15 Gram(s) Oral once PRN  glucagon  Injectable 1 milliGRAM(s) IntraMuscular once  insulin lispro (ADMELOG) corrective regimen sliding scale   SubCutaneous Before meals and at bedtime  levothyroxine 100 MICROGram(s) Oral daily  predniSONE   Tablet 2 milliGRAM(s) Oral daily  dextrose 5%. 1000 milliLiter(s) IV Continuous <Continuous>  dextrose 5%. 1000 milliLiter(s) IV Continuous <Continuous>  enoxaparin Injectable 40 milliGRAM(s) SubCutaneous every 24 hours  folic acid 1 milliGRAM(s) Oral daily  methotrexate 2.5 milliGRAM(s) Oral every week  oxybutynin 5 milliGRAM(s) Oral two times a day  raloxifene 60 milliGRAM(s) Oral daily    [PHYSICAL EXAM:  TELEMETRY:  T(C): 36.4 (04-30-22 @ 11:10), Max: 36.7 (04-29-22 @ 15:49)  HR: 82 (04-30-22 @ 08:59) (77 - 88)  BP: 149/79 (04-30-22 @ 08:59) (133/66 - 166/87)  RR: 18 (04-30-22 @ 08:59) (18 - 18)  SpO2: 97% (04-30-22 @ 08:59) (95% - 97%)  Wt(kg): --  I&O's Summary    29 Apr 2022 07:01  -  30 Apr 2022 07:00  --------------------------------------------------------  IN: 417 mL / OUT: 1050 mL / NET: -633 mL    30 Apr 2022 07:01  -  30 Apr 2022 11:16  --------------------------------------------------------  IN: 180 mL / OUT: 0 mL / NET: 180 mL      Height (cm): 152.4 (04-29 @ 15:49)  Weight (kg): 55.3 (04-29 @ 15:49)  BMI (kg/m2): 23.8 (04-29 @ 15:49)  BSA (m2): 1.51 (04-29 @ 15:49)  Baird:  Central/PICC/Mid Line:                                         Appearance: Normal	  HEENT:   Normal oral mucosa, PERRL, EOMI	  Neck: Supple, + JVD/ - JVD; Carotid Bruit   Cardiovascular: Normal S1 S2, No JVD, No murmurs,   Respiratory: Lungs clear to auscultation/Decreased Breath Sounds/No Rales, Rhonchi, Wheezing	  Gastrointestinal:  Soft, Non-tender, + BS	  Skin: No rashes, No ecchymoses, No cyanosis  Extremities: Normal range of motion, No clubbing, cyanosis or edema  Vascular: Peripheral pulses palpable 2+ bilaterally  Neurologic: Non-focal  Psychiatry: A & O x 3, Mood & affect appropriate    	    ECG:  	  RADIOLOGY:   DIAGNOSTIC TESTING:  [x ] Echocardiogram:  3/7/22 @ Dr Burton office w/ Normal LV/RV systolic fx LVEF 60%. Mild LVH. Unspecified severity of LV diastolic dysfunction. Moderate AS.     OTHER: 	    LABS:	 	  CARDIAC MARKERS:               9.9    5.54  )-----------( 193      ( 30 Apr 2022 06:18 )             30.5     04-30    142  |  109<H>  |  12  ----------------------------<  84  3.8   |  23  |  0.70    Ca    7.8<L>      30 Apr 2022 06:18  Mg     2.1     04-30    TPro  6.9  /  Alb  3.7  /  TBili  0.4  /  DBili  x   /  AST  19  /  ALT  15  /  AlkPhos  55  04-29    proBNP: Serum Pro-Brain Natriuretic Peptide: 586 pg/mL (04-29 @ 16:29)    Lipid Profile:   HgA1c:   TSH: Thyroid Stimulating Hormone, Serum: 0.253 uIU/mL (04-30 @ 06:18)    PT/INR - ( 30 Apr 2022 06:18 )   PT: 14.0 sec;   INR: 1.17          PTT - ( 30 Apr 2022 06:18 )  PTT:26.3 sec    ASSESSMENT/PLAN: 	        DVT ppx:  Dispo:     Interventional Cardiology PA Adult Progress Note    Subjective Assessment: Patient seen and examined at bedside. Patient stating her breathing feels better, improved.     ROS Negative except as per Subjective and HPI  	  MEDICATIONS:  amLODIPine   Tablet 5 milliGRAM(s) Oral at bedtime  furosemide    Tablet 20 milliGRAM(s) Oral daily  albuterol/ipratropium for Nebulization 3 milliLiter(s) Nebulizer every 6 hours PRN  gabapentin 100 milliGRAM(s) Oral <User Schedule>  gabapentin 400 milliGRAM(s) Oral at bedtime  allopurinol 100 milliGRAM(s) Oral daily  dextrose 50% Injectable 25 Gram(s) IV Push once  dextrose 50% Injectable 12.5 Gram(s) IV Push once  dextrose 50% Injectable 25 Gram(s) IV Push once  dextrose Oral Gel 15 Gram(s) Oral once PRN  glucagon  Injectable 1 milliGRAM(s) IntraMuscular once  insulin lispro (ADMELOG) corrective regimen sliding scale   SubCutaneous Before meals and at bedtime  levothyroxine 100 MICROGram(s) Oral daily  predniSONE   Tablet 2 milliGRAM(s) Oral daily  dextrose 5%. 1000 milliLiter(s) IV Continuous <Continuous>  dextrose 5%. 1000 milliLiter(s) IV Continuous <Continuous>  enoxaparin Injectable 40 milliGRAM(s) SubCutaneous every 24 hours  folic acid 1 milliGRAM(s) Oral daily  methotrexate 2.5 milliGRAM(s) Oral every week  oxybutynin 5 milliGRAM(s) Oral two times a day  raloxifene 60 milliGRAM(s) Oral daily    [PHYSICAL EXAM:  TELEMETRY:  T(C): 36.4 (04-30-22 @ 11:10), Max: 36.7 (04-29-22 @ 15:49)  HR: 82 (04-30-22 @ 08:59) (77 - 88)  BP: 149/79 (04-30-22 @ 08:59) (133/66 - 166/87)  RR: 18 (04-30-22 @ 08:59) (18 - 18)  SpO2: 97% (04-30-22 @ 08:59) (95% - 97%)  Wt(kg): --  I&O's Summary    29 Apr 2022 07:01  -  30 Apr 2022 07:00  --------------------------------------------------------  IN: 417 mL / OUT: 1050 mL / NET: -633 mL    30 Apr 2022 07:01  -  30 Apr 2022 11:16  --------------------------------------------------------  IN: 180 mL / OUT: 0 mL / NET: 180 mL      Height (cm): 152.4 (04-29 @ 15:49)  Weight (kg): 55.3 (04-29 @ 15:49)  BMI (kg/m2): 23.8 (04-29 @ 15:49)  BSA (m2): 1.51 (04-29 @ 15:49)  Baird:  Central/PICC/Mid Line:                                         Appearance: Normal	  HEENT:   Normal oral mucosa, PERRL, EOMI	  Neck: Supple, + JVD/ - JVD; Carotid Bruit   Cardiovascular: Normal S1 S2, No JVD, No murmurs,   Respiratory: Lungs clear to auscultation/Decreased Breath Sounds/No Rales, Rhonchi, Wheezing	  Gastrointestinal:  Soft, Non-tender, + BS	  Skin: No rashes, No ecchymoses, No cyanosis  Extremities: Normal range of motion, No clubbing, cyanosis or edema  Vascular: Peripheral pulses palpable 2+ bilaterally  Neurologic: Non-focal  Psychiatry: A & O x 3, Mood & affect appropriate    	    ECG:  	  RADIOLOGY:   DIAGNOSTIC TESTING:  [x ] Echocardiogram:  3/7/22 @ Dr Burton office w/ Normal LV/RV systolic fx LVEF 60%. Mild LVH. Unspecified severity of LV diastolic dysfunction. Moderate AS.     OTHER: 	    LABS:	 	  CARDIAC MARKERS:               9.9    5.54  )-----------( 193      ( 30 Apr 2022 06:18 )             30.5     04-30    142  |  109<H>  |  12  ----------------------------<  84  3.8   |  23  |  0.70    Ca    7.8<L>      30 Apr 2022 06:18  Mg     2.1     04-30    TPro  6.9  /  Alb  3.7  /  TBili  0.4  /  DBili  x   /  AST  19  /  ALT  15  /  AlkPhos  55  04-29    proBNP: Serum Pro-Brain Natriuretic Peptide: 586 pg/mL (04-29 @ 16:29)    Lipid Profile:   HgA1c:   TSH: Thyroid Stimulating Hormone, Serum: 0.253 uIU/mL (04-30 @ 06:18)    PT/INR - ( 30 Apr 2022 06:18 )   PT: 14.0 sec;   INR: 1.17     PTT - ( 30 Apr 2022 06:18 )  PTT:26.3 sec     Interventional Cardiology PA Adult Progress Note    Subjective Assessment: Patient seen and examined at bedside. Patient stating her breathing feels better, improved.  Patient a bit worried about the possibility of a stent and needing to be on a blood thinner as patient states she does not feel steady on her feet - and is worried about falls. Patient also states that she is not sure of the "mass" that was found in her stomach last June. She is supposed to follow-up with her outpatient GI doctor, Dr. FRASER Negative except as per Subjective and HPI  	  MEDICATIONS:  amLODIPine   Tablet 5 milliGRAM(s) Oral at bedtime  furosemide    Tablet 20 milliGRAM(s) Oral daily  albuterol/ipratropium for Nebulization 3 milliLiter(s) Nebulizer every 6 hours PRN  gabapentin 100 milliGRAM(s) Oral <User Schedule>  gabapentin 400 milliGRAM(s) Oral at bedtime  allopurinol 100 milliGRAM(s) Oral daily  dextrose 50% Injectable 25 Gram(s) IV Push once  dextrose 50% Injectable 12.5 Gram(s) IV Push once  dextrose 50% Injectable 25 Gram(s) IV Push once  dextrose Oral Gel 15 Gram(s) Oral once PRN  glucagon  Injectable 1 milliGRAM(s) IntraMuscular once  insulin lispro (ADMELOG) corrective regimen sliding scale   SubCutaneous Before meals and at bedtime  levothyroxine 100 MICROGram(s) Oral daily  predniSONE   Tablet 2 milliGRAM(s) Oral daily  dextrose 5%. 1000 milliLiter(s) IV Continuous <Continuous>  dextrose 5%. 1000 milliLiter(s) IV Continuous <Continuous>  enoxaparin Injectable 40 milliGRAM(s) SubCutaneous every 24 hours  folic acid 1 milliGRAM(s) Oral daily  methotrexate 2.5 milliGRAM(s) Oral every week  oxybutynin 5 milliGRAM(s) Oral two times a day  raloxifene 60 milliGRAM(s) Oral daily    [PHYSICAL EXAM:  TELEMETRY:  T(C): 36.4 (04-30-22 @ 11:10), Max: 36.7 (04-29-22 @ 15:49)  HR: 82 (04-30-22 @ 08:59) (77 - 88)  BP: 149/79 (04-30-22 @ 08:59) (133/66 - 166/87)  RR: 18 (04-30-22 @ 08:59) (18 - 18)  SpO2: 97% (04-30-22 @ 08:59) (95% - 97%)  Wt(kg): --  I&O's Summary    29 Apr 2022 07:01  -  30 Apr 2022 07:00  --------------------------------------------------------  IN: 417 mL / OUT: 1050 mL / NET: -633 mL    30 Apr 2022 07:01  -  30 Apr 2022 11:16  --------------------------------------------------------  IN: 180 mL / OUT: 0 mL / NET: 180 mL      Height (cm): 152.4 (04-29 @ 15:49)  Weight (kg): 55.3 (04-29 @ 15:49)  BMI (kg/m2): 23.8 (04-29 @ 15:49)  BSA (m2): 1.51 (04-29 @ 15:49)  Baird:  Central/PICC/Mid Line:                                         Appearance: Normal, sitting comfortably in bed   HEENT:   Normal oral mucosa, PERRL, EOMI	  Neck: Supple, - JVD; Carotid Bruit   Cardiovascular: Normal S1 S2, No JVD, No murmurs,   Respiratory: Lungs clear to auscultation	  Gastrointestinal:  Soft, Non-tender, + BS	  Skin: No rashes, No ecchymoses, No cyanosis  Extremities: Normal range of motion, No clubbing, cyanosis or edema  Vascular: Peripheral pulses palpable 2+ bilaterally  Neurologic: Non-focal  Psychiatry: A & O x 3, Mood & affect appropriate    	    ECG:  	  RADIOLOGY:   DIAGNOSTIC TESTING:  [x ] Echocardiogram:  3/7/22 @ Dr Burton office w/ Normal LV/RV systolic fx LVEF 60%. Mild LVH. Unspecified severity of LV diastolic dysfunction. Moderate AS.     OTHER: 	    LABS:	 	  CARDIAC MARKERS:               9.9    5.54  )-----------( 193      ( 30 Apr 2022 06:18 )             30.5     04-30    142  |  109<H>  |  12  ----------------------------<  84  3.8   |  23  |  0.70    Ca    7.8<L>      30 Apr 2022 06:18  Mg     2.1     04-30    TPro  6.9  /  Alb  3.7  /  TBili  0.4  /  DBili  x   /  AST  19  /  ALT  15  /  AlkPhos  55  04-29    proBNP: Serum Pro-Brain Natriuretic Peptide: 586 pg/mL (04-29 @ 16:29)    Lipid Profile: Total cholesterol: 146, LDL: 74 HDL: 62  HgA1c: 5.1  TSH: Thyroid Stimulating Hormone, Serum: 0.253 uIU/mL (04-30 @ 06:18)    PT/INR - ( 30 Apr 2022 06:18 )   PT: 14.0 sec;   INR: 1.17     PTT - ( 30 Apr 2022 06:18 )  PTT:26.3 sec Interventional Cardiology PA Adult Progress Note    Subjective Assessment: Patient seen and examined at bedside. Patient stating her breathing feels better, improved.  Patient a bit worried about the possibility of a stent and needing to be on a blood thinner as patient states she does not feel steady on her feet - and is worried about falls. Patient also states that she is not sure of the "mass" that was found in her stomach last June.     ROS Negative except as per Subjective and HPI  	  MEDICATIONS:  amLODIPine   Tablet 5 milliGRAM(s) Oral at bedtime  furosemide    Tablet 20 milliGRAM(s) Oral daily  albuterol/ipratropium for Nebulization 3 milliLiter(s) Nebulizer every 6 hours PRN  gabapentin 100 milliGRAM(s) Oral <User Schedule>  gabapentin 400 milliGRAM(s) Oral at bedtime  allopurinol 100 milliGRAM(s) Oral daily  dextrose 50% Injectable 25 Gram(s) IV Push once  dextrose 50% Injectable 12.5 Gram(s) IV Push once  dextrose 50% Injectable 25 Gram(s) IV Push once  dextrose Oral Gel 15 Gram(s) Oral once PRN  glucagon  Injectable 1 milliGRAM(s) IntraMuscular once  insulin lispro (ADMELOG) corrective regimen sliding scale   SubCutaneous Before meals and at bedtime  levothyroxine 100 MICROGram(s) Oral daily  predniSONE   Tablet 2 milliGRAM(s) Oral daily  dextrose 5%. 1000 milliLiter(s) IV Continuous <Continuous>  dextrose 5%. 1000 milliLiter(s) IV Continuous <Continuous>  enoxaparin Injectable 40 milliGRAM(s) SubCutaneous every 24 hours  folic acid 1 milliGRAM(s) Oral daily  methotrexate 2.5 milliGRAM(s) Oral every week  oxybutynin 5 milliGRAM(s) Oral two times a day  raloxifene 60 milliGRAM(s) Oral daily    [PHYSICAL EXAM:  TELEMETRY:  T(C): 36.4 (04-30-22 @ 11:10), Max: 36.7 (04-29-22 @ 15:49)  HR: 82 (04-30-22 @ 08:59) (77 - 88)  BP: 149/79 (04-30-22 @ 08:59) (133/66 - 166/87)  RR: 18 (04-30-22 @ 08:59) (18 - 18)  SpO2: 97% (04-30-22 @ 08:59) (95% - 97%)  Wt(kg): --  I&O's Summary    29 Apr 2022 07:01  -  30 Apr 2022 07:00  --------------------------------------------------------  IN: 417 mL / OUT: 1050 mL / NET: -633 mL    30 Apr 2022 07:01  -  30 Apr 2022 11:16  --------------------------------------------------------  IN: 180 mL / OUT: 0 mL / NET: 180 mL      Height (cm): 152.4 (04-29 @ 15:49)  Weight (kg): 55.3 (04-29 @ 15:49)  BMI (kg/m2): 23.8 (04-29 @ 15:49)  BSA (m2): 1.51 (04-29 @ 15:49)  Central/PICC/Mid Line:                                         Appearance: Normal, sitting comfortably in bed   HEENT:   Normal oral mucosa, PERRL, EOMI	  Neck: Supple, - JVD; Carotid Bruit   Cardiovascular: Normal S1 S2, No JVD, No murmurs,   Respiratory: Lungs clear to auscultation	  Gastrointestinal:  Soft, Non-tender, + BS	  Skin: No rashes, No ecchymoses, No cyanosis  Extremities: Normal range of motion, No clubbing, cyanosis or edema  Vascular: Peripheral pulses palpable 2+ bilaterally  Neurologic: Non-focal  Psychiatry: A & O x 3, Mood & affect appropriate    	    ECG:  	  RADIOLOGY:   DIAGNOSTIC TESTING:  [x ] Echocardiogram:  3/7/22 @ Dr Burton office w/ Normal LV/RV systolic fx LVEF 60%. Mild LVH. Unspecified severity of LV diastolic dysfunction. Moderate AS.     OTHER: 	    LABS:	 	  CARDIAC MARKERS:               9.9    5.54  )-----------( 193      ( 30 Apr 2022 06:18 )             30.5     04-30    142  |  109<H>  |  12  ----------------------------<  84  3.8   |  23  |  0.70    Ca    7.8<L>      30 Apr 2022 06:18  Mg     2.1     04-30    TPro  6.9  /  Alb  3.7  /  TBili  0.4  /  DBili  x   /  AST  19  /  ALT  15  /  AlkPhos  55  04-29    proBNP: Serum Pro-Brain Natriuretic Peptide: 586 pg/mL (04-29 @ 16:29)    Lipid Profile: Total cholesterol: 146, LDL: 74 HDL: 62  HgA1c: 5.1  TSH: Thyroid Stimulating Hormone, Serum: 0.253 uIU/mL (04-30 @ 06:18)    PT/INR - ( 30 Apr 2022 06:18 )   PT: 14.0 sec;   INR: 1.17     PTT - ( 30 Apr 2022 06:18 )  PTT:26.3 sec

## 2022-04-30 NOTE — CONSULT NOTE ADULT - ATTENDING COMMENTS
Pt seen and d/w fellow this afternoon.  Hgb stable, no evidence of bleeding.  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

## 2022-04-30 NOTE — PROGRESS NOTE ADULT - PROBLEM SELECTOR PLAN 10
-not on home DM meds  -f/u a1c, discontinue ISS and fingersticks if A1C normal        VTE PPx: Lovenox  Dispo: pending [facility]    Lives at home w/ son and has HHA 5x weekly for 4h/day  walks w/ rollator/cane  f/u PT ashleighal - a1c: 5.1       VTE PPx: Lovenox  Dispo: pending [facility]    Lives at home w/ son and has HHA 5x weekly for 4h/day  walks w/ rollator/cane  f/u PT eval - a1c: 5.1       F: none  E: K >4, Mg > 2  N: DASH/TLC  VTE PPx: Lovenox  Dispo: pending [facility]    Lives at home w/ son and has HHA 5x weekly for 4h/day  walks w/ rollator/cane  f/u PT eval    Case discussed with Dr. Ballard

## 2022-04-30 NOTE — PROGRESS NOTE ADULT - PROBLEM SELECTOR PLAN 7
Patient with statin intolerance; on Zetia 10 mg daily at home   - Lipids: Patient with statin intolerance; on Zetia 10 mg daily at home   - Lipids: Total chol: 146, HDL: 62, LDL: 74

## 2022-04-30 NOTE — CONSULT NOTE ADULT - SUBJECTIVE AND OBJECTIVE BOX
HPI:  89 yo F, statin intolerance, w/ PMHx HTN, HLD, DM, CAD (known severe mRCA dz), moderate AS, ?HFpEF (EF 60%), hx GI bleed (June 2021 @ [facility], found to have stomach mass) hx multiple vasovagal episodes, hypothyroidism, Asthma/COPD (last hospitalization years ago, denies intubations), PMR (on MTX and Prednisone) presented to outpatient cardiologist, Dr Mariscal, c/o worsening dyspnea on minimal exertion w/ associated dizziness and fatigue. Pt also endorses intermittent LE edema. Denies having fevers, chills, cough, CP, palpitations, n/v, hematochezia, melena.  Pt currently asymptomatic. Pt was sent in to ED by Dr Mariscal for concern for unstable angina. Of note - pt states she was started on augmentin for UTI on 4/28.  In ED: Vitals: 98 F - 78bpm - RR18 - 133/66 - 97%. EKG: Sinus @ 75bpm w/ 1st degree HB. Nonspecific STT changes. Labs: trop neg x 1. H/H 10/31. K 3.6. . Trop neg x 1. CXR w/ no acute abnormalities. Pt given KCl 40meq x 1 and lasix 20mg IVP x 1.    Pt now admitted to [facility] to r/o ACS.  (29 Apr 2022 17:46)    GI consulted for clearance for DAPT in light of previous history of GIB.     Allergies    Fosamax (Angioedema)  lisinopril (Angioedema)  sulfa drugs (Hives)  tetanus immune globulin (Hives)    Intolerances    statins (Unknown)    MEDICATIONS:  MEDICATIONS  (STANDING):  allopurinol 100 milliGRAM(s) Oral daily  amLODIPine   Tablet 5 milliGRAM(s) Oral at bedtime  amoxicillin  500 milliGRAM(s)/clavulanate 1 Tablet(s) Oral two times a day  dextrose 5%. 1000 milliLiter(s) (100 mL/Hr) IV Continuous <Continuous>  dextrose 5%. 1000 milliLiter(s) (50 mL/Hr) IV Continuous <Continuous>  dextrose 50% Injectable 25 Gram(s) IV Push once  dextrose 50% Injectable 12.5 Gram(s) IV Push once  dextrose 50% Injectable 25 Gram(s) IV Push once  enoxaparin Injectable 40 milliGRAM(s) SubCutaneous every 24 hours  folic acid 1 milliGRAM(s) Oral daily  furosemide    Tablet 20 milliGRAM(s) Oral daily  gabapentin 100 milliGRAM(s) Oral <User Schedule>  gabapentin 400 milliGRAM(s) Oral at bedtime  glucagon  Injectable 1 milliGRAM(s) IntraMuscular once  insulin lispro (ADMELOG) corrective regimen sliding scale   SubCutaneous Before meals and at bedtime  levothyroxine 88 MICROGram(s) Oral daily  methotrexate 2.5 milliGRAM(s) Oral every week  raloxifene 60 milliGRAM(s) Oral daily    MEDICATIONS  (PRN):  albuterol/ipratropium for Nebulization 3 milliLiter(s) Nebulizer every 6 hours PRN Shortness of Breath and/or Wheezing  dextrose Oral Gel 15 Gram(s) Oral once PRN Blood Glucose LESS THAN 70 milliGRAM(s)/deciliter    PAST MEDICAL & SURGICAL HISTORY:  HTN (hypertension)    COPD (chronic obstructive pulmonary disease)    Arthritis    Polymyalgia rheumatica    Hyperlipidemia    Chronic diastolic heart failure    Coronary artery disease    Diabetes    H/O bilateral oophorectomy    H/O total hysterectomy      FAMILY HISTORY:    SOCIAL HISTORY:  [place]: [ ] Current, [ ] Former, [ ] Never; Pack Years:  Alcohol:  Illicit Drugs:    REVIEW OF SYSTEMS:  Constitutional: No fever, chills, weight loss, or fatigue  ENMT:  No visual changes; No difficulty hearing, tinnitus, vertigo; No sinus or throat pain  Neck: No pain or stiffness  Respiratory: No cough, wheezing, or hemoptysis; No shortness of breath  Cardiovascular: No chest pain, palpitations, dizziness, orthopnea, PND, or leg swelling  Gastrointestinal: No abdominal or epigastric pain. No  nausea, vomiting, or hematemesis. No diarrhea, constipation, or steatorrhea. No melena or hematochezia  Genitourinary: No dysuria, urinary frequency/hesitancy, or hematuria  Skin: No itching, burning, rashes or lesions   Musculoskeletal: No joint pain or swelling; No muscle, back or extremity pain    Vital Signs Last 24 Hrs  T(C): 36.6 (30 Apr 2022 14:29), Max: 36.7 (29 Apr 2022 19:42)  T(F): 97.8 (30 Apr 2022 14:29), Max: 98 (29 Apr 2022 19:42)  HR: 64 (30 Apr 2022 12:53) (64 - 88)  BP: 127/65 (30 Apr 2022 12:53) (127/65 - 166/87)  BP(mean): --  RR: 18 (30 Apr 2022 12:53) (18 - 18)  SpO2: 95% (30 Apr 2022 12:53) (95% - 97%)    04-29 @ 07:01  - 04-30 @ 07:00  --------------------------------------------------------  IN: 417 mL / OUT: 1050 mL / NET: -633 mL    04-30 @ 07:01 - 04-30 @ 16:19  --------------------------------------------------------  IN: 180 mL / OUT: 0 mL / NET: 180 mL        PHYSICAL EXAM:    General: Elderly,  female; sitting up in bed; in no acute distress  HEENT: MMM, conjunctiva and sclera clear  Gastrointestinal: Soft, non-tender non-distended; Normal bowel sounds; No rebound or guarding  Extremities: Normal range of motion, No clubbing, cyanosis or edema  Neurological: Alert and oriented x3  Skin: Warm and dry. No obvious rash    LABS:                        9.9    5.54  )-----------( 193      ( 30 Apr 2022 06:18 )             30.5     04-30    142  |  109<H>  |  12  ----------------------------<  84  3.8   |  23  |  0.70    Ca    7.8<L>      30 Apr 2022 06:18  Mg     2.1     04-30    TPro  6.9  /  Alb  3.7  /  TBili  0.4  /  DBili  x   /  AST  19  /  ALT  15  /  AlkPhos  55  04-29        RADIOLOGY & ADDITIONAL STUDIES:

## 2022-04-30 NOTE — GOALS OF CARE CONVERSATION - ADVANCED CARE PLANNING - CONVERSATION DETAILS
LACE > 11, GOC discussed with patient.   Discussion included but not limited to who is Health Care Proxy, Code status, MOLST, diagnosis, prognosis, treatment options, risks and benefits, comfort measures, pain management, Home Care, and palliative care referral reviewed as best as possible.  Per Patient’s wishes She confirmed Patient to be FULL CODE

## 2022-04-30 NOTE — CONSULT NOTE ADULT - ASSESSMENT
89 yo F, statin intolerance, w/ PMHx HTN, HLD, DM, CAD (known severe mRCA dz), moderate AS, ?HFpEF (EF 60%), hx GI bleed (June 2021 @ [facility], found to have stomach mass) hx multiple vasovagal episodes, hypothyroidism, Asthma/COPD (last hospitalization years ago, denies intubations), PMR (on MTX and Prednisone) presented to outpatient cardiologist, Dr Mariscal, c/o worsening dyspnea on minimal exertion w/ associated dizziness and fatigue, planned for cardiac catheterization this week. GI consulted for DAPT clearance in the setting of previous history of GIB.    #Clearance for DAPT  Patient with multiple cardiac comorbidities presenting to [facility] with worsening dyspnea on exertion and fatigue, planned for cardiac catheterization this week. With previous history of GIB in 2021, where patient presented with complaints of hematochezia. 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 Would recommend continuation of PPI tx as noted above if placed on DAPT    Case discussed with [facility] attending and primary team.     Thank you for allowing us to participate in the care of this patient.  GI will sign off. Please call back with any questions or concerns.     Evita Calix,   Gastroenterology Fellow  Pager: [phone]

## 2022-04-30 NOTE — PROGRESS NOTE ADULT - ASSESSMENT
89 yo F, statin intolerance, w/ PMHx HTN, HLD, DM, CAD (known severe mRCA dz), moderate AS, ?HFpEF (EF 60%), hx GI bleed (June 2021 @ [facility], found to have stomach mass) hx multiple vasovagal episodes, hypothyroidism, Asthma/COPD (last hospitalization years ago, denies intubations), PMR (on MTX and Prednisone) presented to outpatient cardiologist, Dr Mariscal, c/o worsening dyspnea on minimal exertion w/ associated dizziness and fatigue x last 7 weeks. Pt also endorses intermittent LE edema. Pt has known severe mRCA lesion as above, so was sent in to r/o ACS w/ plan for [facility] Monday w/ Dr Saldana.      89 yo F, statin intolerance, w/ PMHx HTN, HLD, DM, CAD (known severe mRCA dz), moderate AS, ?HFpEF (EF 60%), hx GI bleed (June 2021 @ [facility], found to have stomach mass) hx multiple vasovagal episodes, hypothyroidism, Asthma/COPD (last hospitalization years ago, denies intubations), PMR (on MTX and Prednisone) presented to outpatient cardiologist, Dr Mariscal, c/o worsening dyspnea on minimal exertion w/ associated dizziness and fatigue x last 7 weeks. Pt also endorses intermittent LE edema. Pt has known severe mRCA lesion as above, so was sent in to r/o ACS.

## 2022-04-30 NOTE — PROGRESS NOTE ADULT - PROBLEM SELECTOR PLAN 4
-pt reports acute UTI and prescribed augmentin 500-150 w/ unknown regimen on 4/28  -call pharmacy in am and order regimen as prescribed Patient recently diagnosed with a UTI - was placed on Augmentin 500-125 mg BID on 4/28/22 x 15 days  - Continue Augmentin

## 2022-04-30 NOTE — PROGRESS NOTE ADULT - PROBLEM SELECTOR PLAN 1
Patient presenting w/ hx dyspnea, acutely worsening w/ minimal exertion x past 7 weeks  - troponin negative x 3   - EKG sinus @ 75bpm w/ 1st degree av block and nonspecific STT changes  - CXR clear;   - Pt w/ hx of HFpEF, w/ mild JVD and LE edema s/p lasix 20mg IVP x 1 in ED. no standing IV lasix at this time  - [facility] 9/2020 @ [facility] w/ severe midLAD dz  - tentative plan for [facility] Monday w/ Dr Saldana  - consented (in 5u office) and email sent to schedulers  - Discuss case w/ IC team on monday regarding hx of GI bleed (H/H stable at this time)  - Old note w/ concern for ASA allergy. Pt states no allergy and that she was told not to take it because of the GI bleed Patient presenting w/ hx dyspnea, acutely worsening w/ minimal exertion x past 7 weeks  - troponin negative x 3   - EKG sinus @ 75bpm w/ 1st degree av block and nonspecific STT changes  - CXR clear;   - Pt w/ hx of HFpEF, w/ mild JVD and LE edema s/p lasix 20mg IVP x 1 in ED. no standing IV lasix at this time  - [facility] 9/2020 @ [facility] w/ severe midLAD dz  - Repeat echo ordered  - Patient with hx of GI bleed - will obtain collateral and GI clearance prior to possibly getting cath   - If cath - patient consented (in 5u office) and email sent to schedulers  - Old note w/ concern for ASA allergy. Pt states no allergy and that she was told not to take it because of the GI bleed Patient presenting w/ hx dyspnea, acutely worsening w/ minimal exertion x past 7 weeks  -Troponin negative x 3   - EKG sinus @ 75bpm w/ 1st degree av block and nonspecific STT changes  - CXR clear;   - Pt w/ hx of HFpEF, w/ mild JVD and LE edema s/p lasix 20mg IVP x 1 in ED. no standing IV lasix at this time  - [facility] 9/2020 @ [facility] w/ severe midLAD dz  - Repeat echo ordered  - Patient with hx of GI bleed - will obtain collateral and GI clearance prior to possibly getting cath   - If cath - patient consented (in 5u office) and email sent to schedulers  - Old note w/ concern for ASA allergy. Pt states no allergy and that she was told not to take it because of the GI bleed

## 2022-04-30 NOTE — PROGRESS NOTE ADULT - PROBLEM SELECTOR PLAN 3
Hx GI bleed Jun 2021 @ [facility] requiring 3 units PRBCs  - s/p EGD at the time revealing stomach mass - no intervention at that time  - Hgb in fall 2021 ~10; H/H: 9.9/30.5  - No signs of bleeding at this time  - GI consulted for DAPT clearance Hx GI bleed Jun 2021 @ [facility] requiring 3 units PRBCs  - s/p EGD at the time revealing stomach mass - no intervention at that time  - Will obtain collateral from outpatient GI doc -  _____  - GI consult - to obtain clearance for DAPT   - Will make decision for cath once clearance/collateral made Hx GI bleed Jun 2021 @ [facility] requiring 3 units PRBCs  - s/p EGD at the time revealing stomach mass - no intervention at that time  - GI consult - to obtain clearance for DAPT   - Will make decision for cath once clearance/collateral made

## 2022-05-01 LAB
ALBUMIN SERPL ELPH-MCNC: 3.1 G/DL — LOW (ref 3.3–5)
ALP SERPL-CCNC: 52 U/L — SIGNIFICANT CHANGE UP (ref 40–120)
ALT FLD-CCNC: 12 U/L — SIGNIFICANT CHANGE UP (ref 10–45)
ANION GAP SERPL CALC-SCNC: 10 MMOL/L — SIGNIFICANT CHANGE UP (ref 5–17)
AST SERPL-CCNC: 15 U/L — SIGNIFICANT CHANGE UP (ref 10–40)
BASOPHILS # BLD AUTO: 0.03 K/UL — SIGNIFICANT CHANGE UP (ref 0–0.2)
BASOPHILS NFR BLD AUTO: 0.5 % — SIGNIFICANT CHANGE UP (ref 0–2)
BILIRUB SERPL-MCNC: 0.4 MG/DL — SIGNIFICANT CHANGE UP (ref 0.2–1.2)
BUN SERPL-MCNC: 13 MG/DL — SIGNIFICANT CHANGE UP (ref 7–23)
CALCIUM SERPL-MCNC: 7.9 MG/DL — LOW (ref 8.4–10.5)
CHLORIDE SERPL-SCNC: 107 MMOL/L — SIGNIFICANT CHANGE UP (ref 96–108)
CO2 SERPL-SCNC: 23 MMOL/L — SIGNIFICANT CHANGE UP (ref 22–31)
CREAT SERPL-MCNC: 0.71 MG/DL — SIGNIFICANT CHANGE UP (ref 0.5–1.3)
EGFR: 79 ML/MIN/1.73M2 — SIGNIFICANT CHANGE UP
EOSINOPHIL # BLD AUTO: 0.18 K/UL — SIGNIFICANT CHANGE UP (ref 0–0.5)
EOSINOPHIL NFR BLD AUTO: 3 % — SIGNIFICANT CHANGE UP (ref 0–6)
GLUCOSE SERPL-MCNC: 77 MG/DL — SIGNIFICANT CHANGE UP (ref 70–99)
HCT VFR BLD CALC: 30.8 % — LOW (ref 34.5–45)
HGB BLD-MCNC: 9.9 G/DL — LOW (ref 11.5–15.5)
IMM GRANULOCYTES NFR BLD AUTO: 0.2 % — SIGNIFICANT CHANGE UP (ref 0–1.5)
LYMPHOCYTES # BLD AUTO: 2.48 K/UL — SIGNIFICANT CHANGE UP (ref 1–3.3)
LYMPHOCYTES # BLD AUTO: 40.8 % — SIGNIFICANT CHANGE UP (ref 13–44)
MAGNESIUM SERPL-MCNC: 2 MG/DL — SIGNIFICANT CHANGE UP (ref 1.6–2.6)
MCHC RBC-ENTMCNC: 30.7 PG — SIGNIFICANT CHANGE UP (ref 27–34)
MCHC RBC-ENTMCNC: 32.1 GM/DL — SIGNIFICANT CHANGE UP (ref 32–36)
MCV RBC AUTO: 95.7 FL — SIGNIFICANT CHANGE UP (ref 80–100)
MONOCYTES # BLD AUTO: 0.36 K/UL — SIGNIFICANT CHANGE UP (ref 0–0.9)
MONOCYTES NFR BLD AUTO: 5.9 % — SIGNIFICANT CHANGE UP (ref 2–14)
NEUTROPHILS # BLD AUTO: 3.02 K/UL — SIGNIFICANT CHANGE UP (ref 1.8–7.4)
NEUTROPHILS NFR BLD AUTO: 49.6 % — SIGNIFICANT CHANGE UP (ref 43–77)
NRBC # BLD: 0 /100 WBCS — SIGNIFICANT CHANGE UP (ref 0–0)
PLATELET # BLD AUTO: 220 K/UL — SIGNIFICANT CHANGE UP (ref 150–400)
POTASSIUM SERPL-MCNC: 3.7 MMOL/L — SIGNIFICANT CHANGE UP (ref 3.5–5.3)
POTASSIUM SERPL-SCNC: 3.7 MMOL/L — SIGNIFICANT CHANGE UP (ref 3.5–5.3)
PROT SERPL-MCNC: 6.2 G/DL — SIGNIFICANT CHANGE UP (ref 6–8.3)
RBC # BLD: 3.22 M/UL — LOW (ref 3.8–5.2)
RBC # FLD: 16 % — HIGH (ref 10.3–14.5)
SODIUM SERPL-SCNC: 140 MMOL/L — SIGNIFICANT CHANGE UP (ref 135–145)
WBC # BLD: 6.08 K/UL — SIGNIFICANT CHANGE UP (ref 3.8–10.5)
WBC # FLD AUTO: 6.08 K/UL — SIGNIFICANT CHANGE UP (ref 3.8–10.5)

## 2022-05-01 PROCEDURE — 99233 SBSQ HOSP IP/OBS HIGH 50: CPT

## 2022-05-01 PROCEDURE — 93010 ELECTROCARDIOGRAM REPORT: CPT

## 2022-05-01 RX ORDER — OXYBUTYNIN CHLORIDE 5 MG
5 TABLET ORAL DAILY
Refills: 0 | Status: DISCONTINUED | OUTPATIENT
Start: 2022-05-01 | End: 2022-05-01

## 2022-05-01 RX ORDER — LEVOTHYROXINE SODIUM 125 MCG
100 TABLET ORAL DAILY
Refills: 0 | Status: DISCONTINUED | OUTPATIENT
Start: 2022-05-01 | End: 2022-05-01

## 2022-05-01 RX ORDER — GABAPENTIN 400 MG/1
100 CAPSULE ORAL ONCE
Refills: 0 | Status: COMPLETED | OUTPATIENT
Start: 2022-05-01 | End: 2022-05-01

## 2022-05-01 RX ORDER — POTASSIUM CHLORIDE 20 MEQ
40 PACKET (EA) ORAL ONCE
Refills: 0 | Status: COMPLETED | OUTPATIENT
Start: 2022-05-01 | End: 2022-05-01

## 2022-05-01 RX ORDER — OXYBUTYNIN CHLORIDE 5 MG
2.5 TABLET ORAL
Refills: 0 | Status: DISCONTINUED | OUTPATIENT
Start: 2022-05-01 | End: 2022-05-03

## 2022-05-01 RX ORDER — LEVOTHYROXINE SODIUM 125 MCG
88 TABLET ORAL DAILY
Refills: 0 | Status: DISCONTINUED | OUTPATIENT
Start: 2022-05-01 | End: 2022-05-01

## 2022-05-01 RX ORDER — GABAPENTIN 400 MG/1
100 CAPSULE ORAL DAILY
Refills: 0 | Status: DISCONTINUED | OUTPATIENT
Start: 2022-05-02 | End: 2022-05-03

## 2022-05-01 RX ORDER — LEVOTHYROXINE SODIUM 125 MCG
88 TABLET ORAL DAILY
Refills: 0 | Status: DISCONTINUED | OUTPATIENT
Start: 2022-05-02 | End: 2022-05-03

## 2022-05-01 RX ADMIN — Medication 40 MILLIEQUIVALENT(S): at 10:28

## 2022-05-01 RX ADMIN — Medication 20 MILLIGRAM(S): at 10:28

## 2022-05-01 RX ADMIN — Medication 100 MILLIGRAM(S): at 11:36

## 2022-05-01 RX ADMIN — Medication 1 TABLET(S): at 21:29

## 2022-05-01 RX ADMIN — Medication 1 TABLET(S): at 10:34

## 2022-05-01 RX ADMIN — Medication 100 MICROGRAM(S): at 09:16

## 2022-05-01 RX ADMIN — Medication 1 MILLIGRAM(S): at 11:36

## 2022-05-01 RX ADMIN — RALOXIFENE HYDROCHLORIDE 60 MILLIGRAM(S): 60 TABLET, COATED ORAL at 11:38

## 2022-05-01 RX ADMIN — Medication 2.5 MILLIGRAM(S): at 18:32

## 2022-05-01 RX ADMIN — AMLODIPINE BESYLATE 5 MILLIGRAM(S): 2.5 TABLET ORAL at 21:18

## 2022-05-01 RX ADMIN — GABAPENTIN 400 MILLIGRAM(S): 400 CAPSULE ORAL at 21:18

## 2022-05-01 RX ADMIN — GABAPENTIN 100 MILLIGRAM(S): 400 CAPSULE ORAL at 13:53

## 2022-05-01 RX ADMIN — ENOXAPARIN SODIUM 40 MILLIGRAM(S): 100 INJECTION SUBCUTANEOUS at 21:18

## 2022-05-01 NOTE — PROGRESS NOTE ADULT - SUBJECTIVE AND OBJECTIVE BOX
Interventional Cardiology PA Adult Progress Note    CC: JOHNSON, dizziness, fatigue  Subjective Assessment: Patient seen and examined at bedside. Patient reports feeling well. She denies C/P, SOB, palpitations, dizziness, diaphoresis, N/V, BRBPR, melena, hematemesis.  ROS otherwise negative unless otherwise stated except per HPI and Subjective  	  MEDICATIONS:  amLODIPine   Tablet 5 milliGRAM(s) Oral at bedtime  furosemide    Tablet 20 milliGRAM(s) Oral daily    amoxicillin  500 milliGRAM(s)/clavulanate 1 Tablet(s) Oral two times a day    albuterol/ipratropium for Nebulization 3 milliLiter(s) Nebulizer every 6 hours PRN    gabapentin 100 milliGRAM(s) Oral <User Schedule>  gabapentin 400 milliGRAM(s) Oral at bedtime      allopurinol 100 milliGRAM(s) Oral daily  dextrose 50% Injectable 25 Gram(s) IV Push once  glucagon  Injectable 1 milliGRAM(s) IntraMuscular once  levothyroxine 100 MICROGram(s) Oral daily    enoxaparin Injectable 40 milliGRAM(s) SubCutaneous every 24 hours  folic acid 1 milliGRAM(s) Oral daily  methotrexate 2.5 milliGRAM(s) Oral every week  oxybutynin 2.5 milliGRAM(s) Oral two times a day  raloxifene 60 milliGRAM(s) Oral daily      	    [PHYSICAL EXAM:  TELEMETRY:  T(C): 37.4 (05-01-22 @ 10:00), Max: 37.4 (05-01-22 @ 10:00)  HR: 70 (05-01-22 @ 08:40) (64 - 94)  BP: 136/81 (05-01-22 @ 08:40) (104/64 - 139/78)  RR: 18 (05-01-22 @ 08:40) (16 - 18)  SpO2: 95% (05-01-22 @ 08:40) (93% - 97%)  Wt(kg): --  I&O's Summary    30 Apr 2022 07:01  -  01 May 2022 07:00  --------------------------------------------------------  IN: 460 mL / OUT: 975 mL / NET: -515 mL    01 May 2022 07:01  -  01 May 2022 10:50  --------------------------------------------------------  IN: 420 mL / OUT: 0 mL / NET: 420 mL        Baird:  Central/PICC/Mid Line:                                         Appearance: Normal	  HEENT:   Normal oral mucosa, PERRL, EOMI	  Neck: Supple, + JVD/ - JVD; Carotid Bruit   Cardiovascular: Normal S1 S2, No JVD, No murmurs,   Respiratory: Lungs clear to auscultation/Decreased Breath Sounds/No Rales, Rhonchi, Wheezing	  Gastrointestinal:  Soft, Non-tender, + BS	  Skin: No rashes, No ecchymoses, No cyanosis  Extremities: Normal range of motion, No clubbing, cyanosis or edema  Vascular: Peripheral pulses palpable 2+ bilaterally  Neurologic: Non-focal  Psychiatry: A & O x 3, Mood & affect appropriate      	    ECG:  	  RADIOLOGY:   DIAGNOSTIC TESTING:  [ ] Echocardiogram:  [ ]  Catheterization:  [ ] Stress Test:    [ ] GLORIA  OTHER: 	    LABS:	 	  CARDIAC MARKERS:                                  9.9    6.08  )-----------( 220      ( 01 May 2022 07:08 )             30.8     05-01    140  |  107  |  13  ----------------------------<  77  3.7   |  23  |  0.71    Ca    7.9<L>      01 May 2022 07:08  Mg     2.0     05-01    TPro  6.2  /  Alb  3.1<L>  /  TBili  0.4  /  DBili  x   /  AST  15  /  ALT  12  /  AlkPhos  52  05-01    proBNP:   Lipid Profile:   HgA1c:   TSH:   PT/INR - ( 30 Apr 2022 06:18 )   PT: 14.0 sec;   INR: 1.17          PTT - ( 30 Apr 2022 06:18 )  PTT:26.3 sec    ASSESSMENT/PLAN: 	           Interventional Cardiology PA Adult Progress Note    CC: JOHNSON, dizziness, fatigue  Subjective Assessment: Patient seen and examined at bedside. Patient reports feeling well. She denies C/P, SOB, palpitations, dizziness, diaphoresis, N/V, BRBPR, melena, hematemesis.  ROS otherwise negative unless otherwise stated except per HPI and Subjective  	  MEDICATIONS:  amLODIPine   Tablet 5 milliGRAM(s) Oral at bedtime  furosemide    Tablet 20 milliGRAM(s) Oral daily  amoxicillin  500 milliGRAM(s)/clavulanate 1 Tablet(s) Oral two times a day  albuterol/ipratropium for Nebulization 3 milliLiter(s) Nebulizer every 6 hours PRN  gabapentin 100 milliGRAM(s) Oral <User Schedule>  gabapentin 400 milliGRAM(s) Oral at bedtime  allopurinol 100 milliGRAM(s) Oral daily  dextrose 50% Injectable 25 Gram(s) IV Push once  glucagon  Injectable 1 milliGRAM(s) IntraMuscular once  levothyroxine 100 MICROGram(s) Oral daily  enoxaparin Injectable 40 milliGRAM(s) SubCutaneous every 24 hours  folic acid 1 milliGRAM(s) Oral daily  methotrexate 2.5 milliGRAM(s) Oral every week  oxybutynin 2.5 milliGRAM(s) Oral two times a day  raloxifene 60 milliGRAM(s) Oral daily    [PHYSICAL EXAM:  TELEMETRY:  T(C): 37.4 (05-01-22 @ 10:00), Max: 37.4 (05-01-22 @ 10:00)  HR: 70 (05-01-22 @ 08:40) (64 - 94)  BP: 136/81 (05-01-22 @ 08:40) (104/64 - 139/78)  RR: 18 (05-01-22 @ 08:40) (16 - 18)  SpO2: 95% (05-01-22 @ 08:40) (93% - 97%)  Wt(kg): --  I&O's Summary    30 Apr 2022 07:01  -  01 May 2022 07:00  --------------------------------------------------------  IN: 460 mL / OUT: 975 mL / NET: -515 mL    01 May 2022 07:01  -  01 May 2022 10:50  --------------------------------------------------------  IN: 420 mL / OUT: 0 mL / NET: 420 mL    Baird: No  Central/PICC/Mid Line: No                                        Appearance: Elderly female in NAD.	  HEENT:  Normal oral mucosa, PERRL, EOMI	  Neck: Supple. Mild JVD  Cardiovascular: + S1 S2. RRR. 2/6 BUSTER RUSB.   Respiratory: CTA Bilaterally. No rhonchi, wheezes, rales	  Gastrointestinal:  BS X 4. Soft NT/ND  Skin: No rashes, No ecchymoses, No cyanosis  Extremities: Normal range of motion, No clubbing, cyanosis or edema BLE. No calf tenderness BLE  Neurologic: Non-focal  Psychiatry: A & O x 3, Mood & affect appropriate    LABS:	 	  CARDIAC MARKERS:                       9.9    6.08  )-----------( 220      ( 01 May 2022 07:08 )             30.8     05-01    140  |  107  |  13  ----------------------------<  77  3.7   |  23  |  0.71    Ca    7.9<L>      01 May 2022 07:08  Mg     2.0     05-01    TPro  6.2  /  Alb  3.1<L>  /  TBili  0.4  /  DBili  x   /  AST  15  /  ALT  12  /  AlkPhos  52  05-01  PT/INR - ( 30 Apr 2022 06:18 )   PT: 14.0 sec;   INR: 1.17     PTT - ( 30 Apr 2022 06:18 )  PTT:26.3 sec

## 2022-05-01 NOTE — PROGRESS NOTE ADULT - PROBLEM SELECTOR PLAN 10
- a1c: 5.1       F: none  E: K >4, Mg > 2  N: DASH/TLC  VTE PPx: Lovenox  Dispo: pending [facility]    Lives at home w/ son and has HHA 5x weekly for 4h/day  walks w/ rollator/cane  f/u PT eval    Case discussed with Dr. Ballard

## 2022-05-01 NOTE — PROGRESS NOTE ADULT - PROBLEM SELECTOR PLAN 4
Patient recently diagnosed with a UTI - was placed on Augmentin 500-125 mg BID on 4/28/22 x 15 days  - Continue Augmentin  -Patient afebrile without leukocytosis on CBC

## 2022-05-01 NOTE — PROGRESS NOTE ADULT - PROBLEM SELECTOR PLAN 2
mild JVD, Otherwise euvolemic. Lungs CTA and no LE edema.   - CXR clear;   - Echo 3/7/22 @ Dr Burton office w/ Normal LV/RV systolic fx LVEF 60%. Mild LVH. Unspecified severity of LV diastolic dysfunction. Moderate AS.   - S/p lasix 20mg IVP x 1 in ED  - C/w home lasix 20mg PO daily  - Core measures, Strict I&O's, daily weight, fluid restriction mild JVD, Otherwise euvolemic.   - CXR clear;   - Echo 3/7/22 @ Dr Burton office w/ Normal LV/RV systolic fx LVEF 60%. Mild LVH. Unspecified severity of LV diastolic dysfunction. Moderate AS.   - S/p lasix 20mg IVP x 1 in ED  - C/w home lasix 20mg PO daily  - Core measures, Strict I&O's, daily weight, fluid restriction mild JVD, Otherwise euvolemic.   - CXR clear;   - Echo 3/7/22 @ Dr Burton office w/ Normal LV/RV systolic fx LVEF 60%. Mild LVH. Unspecified severity of LV diastolic dysfunction. Moderate AS.   - S/p lasix 20mg IVP x 1 in ED  - C/w home lasix 20mg PO daily  - Core measures, Strict I&O's, daily weight, fluid restriction  -IVF pre-cath to be ordered in AM after assessment given patient with moderate AS and required IV Lasix on admission.

## 2022-05-01 NOTE — PROGRESS NOTE ADULT - PROBLEM SELECTOR PLAN 7
Patient with statin intolerance; on Zetia 10 mg daily at home   - Lipids: Total chol: 146, HDL: 62, LDL: 74

## 2022-05-01 NOTE — PHYSICAL THERAPY INITIAL EVALUATION ADULT - PERTINENT HX OF CURRENT PROBLEM, REHAB EVAL
89 yo F, statin intolerance, w/ PMHx HTN, HLD, DM, CAD (known severe mRCA dz), moderate AS, ?HFpEF (EF 60%), hx GI bleed (June 2021 @ [facility], found to have stomach mass) hx multiple vasovagal episodes, hypothyroidism, Asthma/COPD (last hospitalization years ago, denies intubations), PMR (on MTX and Prednisone) presented to outpatient cardiologist, Dr Mariscal, c/o worsening dyspnea on minimal exertion w/ associated dizziness and fatigue.

## 2022-05-01 NOTE — PROGRESS NOTE ADULT - PROBLEM SELECTOR PLAN 1
Patient presenting w/ hx dyspnea, acutely worsening w/ minimal exertion x past 7 weeks  -Troponin negative x 3   - EKG sinus @ 75bpm w/ 1st degree av block and nonspecific STT changes  - CXR clear;   - Pt w/ hx of HFpEF, w/ mild JVD and LE edema s/p lasix 20mg IVP x 1 in ED. no standing IV lasix at this time  - [facility] 9/2020 @ [facility] w/ severe midLAD dz  - Repeat echo ordered. Follow-up results.   - Patient with hx of GI bleed - per GI cleared for DAPT as Hgb remains stable; would recommend PPI if patient on DAPT  - If cath - patient consented (in 5u office) and email sent to schedulers.   - Old note w/ concern for ASA allergy. Pt states no allergy and that she was told not to take it because of the GI bleed Patient presenting w/ hx dyspnea, acutely worsening w/ minimal exertion x past 7 weeks  -Troponin negative x 3   - EKG sinus @ 75bpm w/ 1st degree av block and nonspecific STT changes  - CXR clear;   - Pt w/ hx of HFpEF, w/ mild JVD and LE edema s/p lasix 20mg IVP x 1 in ED. no standing IV lasix at this time  - [facility] 9/2020 @ [facility] w/ severe midLAD dz  - Repeat echo ordered. Follow-up results.   - Patient with hx of GI bleed - per GI cleared for DAPT as Hgb remains stable; would recommend PPI if patient on DAPT  - Patient for cath with Dr. Saldana-patient prefers to have cath done- patient consented (in 5u office) and email sent to schedulers. NPO after breakfast   - Old note w/ concern for ASA allergy. Pt states no allergy and that she was told not to take it because of the GI bleed Patient presenting w/ hx dyspnea, acutely worsening w/ minimal exertion x past 7 weeks  -Troponin negative x 3   - EKG sinus @ 75bpm w/ 1st degree av block and nonspecific STT changes  - CXR clear;   - Pt w/ hx of HFpEF, w/ mild JVD and LE edema s/p lasix 20mg IVP x 1 in ED. no standing IV lasix at this time  - [facility] 9/2020 @ [facility] w/ severe midLAD dz  - Repeat echo ordered. Follow-up results.   - Patient with hx of GI bleed - per GI cleared for DAPT as Hgb remains stable; would recommend PPI if patient on DAPT  - Patient for cath with Dr. Saldana-patient prefers to have cath done- patient consented (in 5u office) and email sent to schedulers. NPO after breakfast   - Old note w/ concern for ASA allergy. Pt states no allergy and that she was told not to take it because of the GI bleed. ASA/Plavix load on table if needed given Anemia, history of GIB, GIST and elderly frail patient-increased risk of bleed Patient presenting w/ hx dyspnea, acutely worsening w/ minimal exertion x past 7 weeks  -Troponin negative x 3   - EKG sinus @ 75bpm w/ 1st degree av block and nonspecific STT changes  - CXR clear;   - Pt w/ hx of HFpEF, w/ mild JVD and LE edema s/p lasix 20mg IVP x 1 in ED. no standing IV lasix at this time  - [facility] 9/2020 @ [facility] w/ severe midLAD dz  - Repeat echo ordered. Follow-up results.   - Patient with hx of GI bleed - per GI cleared for DAPT as Hgb remains stable; would recommend PPI if patient on DAPT  - Patient for cath with Dr. Saldana-patient prefers to have cath done instead of stress- - patient consented (in 5u office) and email sent to schedulers. NPO after breakfast   - Old note w/ concern for ASA allergy. Pt states no allergy and that she was told not to take it because of the GI bleed. ASA/Plavix load on table if needed given Anemia, history of GIB, GIST and elderly frail patient-increased risk of bleed

## 2022-05-01 NOTE — PHYSICAL THERAPY INITIAL EVALUATION ADULT - ADDITIONAL COMMENTS
Patient lives with grandson in an elevator building. Ambulated with cane independently indoors and with rollator outdoors. HHA assists in outdoor ambulation and ADLs. Has a HHA mon-Fri x 4 hours each day.

## 2022-05-01 NOTE — PROGRESS NOTE ADULT - ASSESSMENT
89 yo F, statin intolerance, w/ PMHx HTN, HLD, CAD (known severe mRCA dz), moderate AS, ?HFpEF (EF 60%), hx GI bleed (June 2021 @ [facility], found to have stomach mass) hx multiple vasovagal episodes, hypothyroidism, Asthma/COPD (last hospitalization years ago, denies intubations), Polymyalgia Rheumatica (on MTX and Prednisone) presented to outpatient cardiologist, Dr Mariscal, c/o worsening dyspnea on minimal exertion w/ associated dizziness and fatigue x last 7 weeks. Pt also endorses intermittent LE edema. Pt has known severe mRCA lesion as above, so was sent in to r/o ACS and Cardiac enzymes negative x 3.      89 yo F, statin intolerance, w/ PMHx HTN, HLD, CAD (known severe mRCA dz), moderate AS, ?HFpEF (EF 60%), hx GI bleed (June 2021 @ [facility], found to have stomach mass) hx multiple vasovagal episodes, hypothyroidism, Asthma/COPD (last hospitalization years ago, denies intubations), Polymyalgia Rheumatica (on MTX and Prednisone) presented to outpatient cardiologist, Dr Mariscal, c/o worsening dyspnea on minimal exertion w/ associated dizziness and fatigue x last 7 weeks. Pt also endorses intermittent LE edema. Pt has known severe mRCA lesion as above, so was sent in to r/o ACS and Cardiac enzymes negative x 3.     Of note: Patient with DM in her history however patient denies and is not currently on DM medications. Hgb A1C 5.1 and patient refused Fingersticks. Fingersticks discontinued

## 2022-05-01 NOTE — PROGRESS NOTE ADULT - PROBLEM SELECTOR PLAN 9
-hx COPD/asthma w/ no chronic home medications  -duonebs prn    F: none  E: K >4, Mg > 2  N: DASH/TLC  VTE PPx: Lovenox  Dispo: pending [facility]    Lives at home w/ son and has HHA 5x weekly for 4h/day  walks w/ rollator/cane  f/u PT eval    Case discussed with Dr. Ballard

## 2022-05-01 NOTE — PROGRESS NOTE ADULT - PROBLEM SELECTOR PLAN 3
Hx GI bleed Jun 2021 @ [facility] requiring 3 units PRBCs  - s/p EGD at the time revealing stomach mass - no intervention at that time  - GI consulted -patient cleared for DAPT as Hgb has remained stable. If patient to be placed on DAPT recommended PPI 40 mg daily.   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 Hx GI bleed Jun 2021 @ [facility] requiring 3 units PRBCs  - s/p EGD at the time revealing stomach mass - no intervention at that time  - GI consulted -patient cleared for DAPT as Hgb has remained stable. If patient to be placed on DAPT recommended PPI 40 mg daily.   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  -Hgb stable at 9.9. No signs of bleeding. Continue to monitor CBC. Maintain Active Type and Screen (most recent 4/30) Hx GI bleed Jun 2021 @ [facility] requiring 3 units PRBCs  - s/p EGD at the time revealing stomach mass - no intervention at that time  - GI consulted -patient cleared for DAPT as Hgb has remained stable. If patient to be placed on DAPT recommended PPI 40 mg daily.   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 No signs of bleeding. Continue to monitor CBC. Maintain Active Type and Screen (most recent 4/30)

## 2022-05-02 ENCOUNTER — TRANSCRIPTION ENCOUNTER (OUTPATIENT)
Age: 87
End: 2022-05-02

## 2022-05-02 LAB
ANION GAP SERPL CALC-SCNC: 9 MMOL/L — SIGNIFICANT CHANGE UP (ref 5–17)
BASOPHILS # BLD AUTO: 0.02 K/UL — SIGNIFICANT CHANGE UP (ref 0–0.2)
BASOPHILS NFR BLD AUTO: 0.4 % — SIGNIFICANT CHANGE UP (ref 0–2)
BUN SERPL-MCNC: 15 MG/DL — SIGNIFICANT CHANGE UP (ref 7–23)
CALCIUM SERPL-MCNC: 8.3 MG/DL — LOW (ref 8.4–10.5)
CHLORIDE SERPL-SCNC: 109 MMOL/L — HIGH (ref 96–108)
CO2 SERPL-SCNC: 23 MMOL/L — SIGNIFICANT CHANGE UP (ref 22–31)
CREAT SERPL-MCNC: 0.77 MG/DL — SIGNIFICANT CHANGE UP (ref 0.5–1.3)
EGFR: 71 ML/MIN/1.73M2 — SIGNIFICANT CHANGE UP
EOSINOPHIL # BLD AUTO: 0.17 K/UL — SIGNIFICANT CHANGE UP (ref 0–0.5)
EOSINOPHIL NFR BLD AUTO: 3.4 % — SIGNIFICANT CHANGE UP (ref 0–6)
GLUCOSE SERPL-MCNC: 77 MG/DL — SIGNIFICANT CHANGE UP (ref 70–99)
HCT VFR BLD CALC: 31 % — LOW (ref 34.5–45)
HGB BLD-MCNC: 9.9 G/DL — LOW (ref 11.5–15.5)
IMM GRANULOCYTES NFR BLD AUTO: 0.4 % — SIGNIFICANT CHANGE UP (ref 0–1.5)
LYMPHOCYTES # BLD AUTO: 1.8 K/UL — SIGNIFICANT CHANGE UP (ref 1–3.3)
LYMPHOCYTES # BLD AUTO: 36.2 % — SIGNIFICANT CHANGE UP (ref 13–44)
MAGNESIUM SERPL-MCNC: 2.1 MG/DL — SIGNIFICANT CHANGE UP (ref 1.6–2.6)
MCHC RBC-ENTMCNC: 30.9 PG — SIGNIFICANT CHANGE UP (ref 27–34)
MCHC RBC-ENTMCNC: 31.9 GM/DL — LOW (ref 32–36)
MCV RBC AUTO: 96.9 FL — SIGNIFICANT CHANGE UP (ref 80–100)
MONOCYTES # BLD AUTO: 0.39 K/UL — SIGNIFICANT CHANGE UP (ref 0–0.9)
MONOCYTES NFR BLD AUTO: 7.8 % — SIGNIFICANT CHANGE UP (ref 2–14)
NEUTROPHILS # BLD AUTO: 2.57 K/UL — SIGNIFICANT CHANGE UP (ref 1.8–7.4)
NEUTROPHILS NFR BLD AUTO: 51.8 % — SIGNIFICANT CHANGE UP (ref 43–77)
NRBC # BLD: 0 /100 WBCS — SIGNIFICANT CHANGE UP (ref 0–0)
PLATELET # BLD AUTO: 219 K/UL — SIGNIFICANT CHANGE UP (ref 150–400)
POTASSIUM SERPL-MCNC: 4.1 MMOL/L — SIGNIFICANT CHANGE UP (ref 3.5–5.3)
POTASSIUM SERPL-SCNC: 4.1 MMOL/L — SIGNIFICANT CHANGE UP (ref 3.5–5.3)
RBC # BLD: 3.2 M/UL — LOW (ref 3.8–5.2)
RBC # FLD: 15.9 % — HIGH (ref 10.3–14.5)
SODIUM SERPL-SCNC: 141 MMOL/L — SIGNIFICANT CHANGE UP (ref 135–145)
WBC # BLD: 4.97 K/UL — SIGNIFICANT CHANGE UP (ref 3.8–10.5)
WBC # FLD AUTO: 4.97 K/UL — SIGNIFICANT CHANGE UP (ref 3.8–10.5)

## 2022-05-02 PROCEDURE — 92928 PRQ TCAT PLMT NTRAC ST 1 LES: CPT | Mod: RC

## 2022-05-02 PROCEDURE — 93458 L HRT ARTERY/VENTRICLE ANGIO: CPT | Mod: 26,59

## 2022-05-02 PROCEDURE — 99152 MOD SED SAME PHYS/QHP 5/>YRS: CPT

## 2022-05-02 PROCEDURE — 99233 SBSQ HOSP IP/OBS HIGH 50: CPT

## 2022-05-02 PROCEDURE — 93306 TTE W/DOPPLER COMPLETE: CPT | Mod: 26

## 2022-05-02 RX ORDER — CLOPIDOGREL BISULFATE 75 MG/1
75 TABLET, FILM COATED ORAL DAILY
Refills: 0 | Status: DISCONTINUED | OUTPATIENT
Start: 2022-05-03 | End: 2022-05-03

## 2022-05-02 RX ORDER — CLOPIDOGREL BISULFATE 75 MG/1
600 TABLET, FILM COATED ORAL ONCE
Refills: 0 | Status: COMPLETED | OUTPATIENT
Start: 2022-05-02 | End: 2022-05-02

## 2022-05-02 RX ORDER — SODIUM CHLORIDE 9 MG/ML
500 INJECTION INTRAMUSCULAR; INTRAVENOUS; SUBCUTANEOUS
Refills: 0 | Status: DISCONTINUED | OUTPATIENT
Start: 2022-05-02 | End: 2022-05-03

## 2022-05-02 RX ORDER — SODIUM CHLORIDE 9 MG/ML
500 INJECTION INTRAMUSCULAR; INTRAVENOUS; SUBCUTANEOUS
Refills: 0 | Status: DISCONTINUED | OUTPATIENT
Start: 2022-05-02 | End: 2022-05-02

## 2022-05-02 RX ORDER — PANTOPRAZOLE SODIUM 20 MG/1
40 TABLET, DELAYED RELEASE ORAL
Refills: 0 | Status: DISCONTINUED | OUTPATIENT
Start: 2022-05-03 | End: 2022-05-03

## 2022-05-02 RX ORDER — ASPIRIN/CALCIUM CARB/MAGNESIUM 324 MG
81 TABLET ORAL DAILY
Refills: 0 | Status: DISCONTINUED | OUTPATIENT
Start: 2022-05-03 | End: 2022-05-03

## 2022-05-02 RX ORDER — ASPIRIN/CALCIUM CARB/MAGNESIUM 324 MG
325 TABLET ORAL ONCE
Refills: 0 | Status: COMPLETED | OUTPATIENT
Start: 2022-05-02 | End: 2022-05-02

## 2022-05-02 RX ADMIN — Medication 88 MICROGRAM(S): at 06:11

## 2022-05-02 RX ADMIN — Medication 100 MILLIGRAM(S): at 11:39

## 2022-05-02 RX ADMIN — SODIUM CHLORIDE 100 MILLILITER(S): 9 INJECTION INTRAMUSCULAR; INTRAVENOUS; SUBCUTANEOUS at 18:21

## 2022-05-02 RX ADMIN — SODIUM CHLORIDE 75 MILLILITER(S): 9 INJECTION INTRAMUSCULAR; INTRAVENOUS; SUBCUTANEOUS at 11:17

## 2022-05-02 RX ADMIN — Medication 2.5 MILLIGRAM(S): at 18:47

## 2022-05-02 RX ADMIN — GABAPENTIN 400 MILLIGRAM(S): 400 CAPSULE ORAL at 21:02

## 2022-05-02 RX ADMIN — RALOXIFENE HYDROCHLORIDE 60 MILLIGRAM(S): 60 TABLET, COATED ORAL at 11:40

## 2022-05-02 RX ADMIN — SODIUM CHLORIDE 100 MILLILITER(S): 9 INJECTION INTRAMUSCULAR; INTRAVENOUS; SUBCUTANEOUS at 18:47

## 2022-05-02 RX ADMIN — Medication 1 TABLET(S): at 21:02

## 2022-05-02 RX ADMIN — AMLODIPINE BESYLATE 5 MILLIGRAM(S): 2.5 TABLET ORAL at 21:01

## 2022-05-02 RX ADMIN — CLOPIDOGREL BISULFATE 600 MILLIGRAM(S): 75 TABLET, FILM COATED ORAL at 18:20

## 2022-05-02 RX ADMIN — ENOXAPARIN SODIUM 40 MILLIGRAM(S): 100 INJECTION SUBCUTANEOUS at 21:01

## 2022-05-02 RX ADMIN — GABAPENTIN 100 MILLIGRAM(S): 400 CAPSULE ORAL at 11:45

## 2022-05-02 RX ADMIN — Medication 325 MILLIGRAM(S): at 18:20

## 2022-05-02 RX ADMIN — Medication 1 MILLIGRAM(S): at 11:40

## 2022-05-02 RX ADMIN — Medication 2.5 MILLIGRAM(S): at 09:25

## 2022-05-02 RX ADMIN — Medication 1 TABLET(S): at 09:25

## 2022-05-02 RX ADMIN — Medication 20 MILLIGRAM(S): at 09:25

## 2022-05-02 NOTE — DISCHARGE NOTE PROVIDER - HOSPITAL COURSE
INCOMPLETE    89 yo F, statin intolerance, w/ PMHx HTN, HLD, CAD (known severe mRCA dz), moderate AS, ?HFpEF (EF 60%), hx GI bleed (June 2021 @ [facility], found to have stomach mass) hx multiple vasovagal episodes, hypothyroidism, Asthma/COPD (last hospitalization years ago, denies intubations), Polymyalgia Rheumatica (on MTX and Prednisone) presented to outpatient cardiologist, Dr Mariscal, c/o worsening dyspnea on minimal exertion w/ associated dizziness and fatigue, sent to ER and r/o ACS. Pt ruled out and Dr. Mariscal/pt requesting cath over NST. GI consulted for DAPT clearance prior to cath, and cleared for DAPT as Hgb has remained stable, should remain on PPI 40 mg QD if continued on DAPT. GIST on EGD is low risk of bleeding, however would benefit from endoscopic evaluation with EGD/EUS as an outpatient with Dr Liao to further elucidate lesion. S/p cardiac catheterization 5/2/22: ______________. Echocardiogram 5/2/22: Normal left ventricular size and systolic function. Moderate AS (peak transvalvular velocity 3.58 m/s, mean transvalvular gradient 30.00 mmHg, aortic valve area 1.26 cm²).    Patient seen and examined at the bedside. No complaints at this time. AM labs WNL. No overnight events on tele, HD stable, AM ECG stable. _____ access site stable with distal pulses to baseline. As per Dr. Ballard, patient is stable for discharge home on _______ with instruction to follow up with Dr. Angel Mariscal in 1-2 weeks.            Cardiac Rehab (STEMI/NSTEMI/ACS/Unstable Angina/CHF/Post PCI):            *Education on benefits of Cardiac Rehab provided to patient: Yes/No         *Referral and Prescription Given for Cardiac Rehab : Yes/No.  If No, Why Not?         *Pt given list of locations & instructed to contact their insurance company to review list of participating providers    Statin Prescribed (STEMI/NSTEMI/UA &/OR PCI this admission):  Yes/No; If No, Why Not?  DAPT: Prescriptions for Aspirin/Plavix/Brilinta/Effient e-prescribed to patient's pharmacy Yes/No__.                *No Aspirin/Plavix/Brilinta/Effient prescribed due to ___.       89 yo F, statin intolerance, w/ PMHx HTN, HLD, CAD (known severe mRCA dz), moderate AS, ?HFpEF (EF 60%), hx GI bleed (June 2021 @ [facility], found to have stomach mass) hx multiple vasovagal episodes, hypothyroidism, Asthma/COPD (last hospitalization years ago, denies intubations), Polymyalgia Rheumatica (on MTX and Prednisone) presented to outpatient cardiologist, Dr Mariscal, c/o worsening dyspnea on minimal exertion w/ associated dizziness and fatigue, sent to ER and r/o ACS. Pt ruled out and Dr. Mariscal/pt requesting cath over NST. GI consulted for DAPT clearance prior to cath, and cleared for DAPT as Hgb has remained stable, should remain on PPI 40 mg QD if continued on DAPT. GIST on EGD is low risk of bleeding, however would benefit from endoscopic evaluation with EGD/EUS as an outpatient with Dr. Liao to further elucidate lesion. S/p cardiac catheterization 5/2/22: CHETAN x 1 mRCA (90%), LM: luminal irregularities, mLAD: 40% calcified, pLCx: Luminal irregularities, EDP: 0. EF as per echo. L TR due at 8 PM. Echocardiogram 5/2/22: Normal left ventricular size and systolic function. Moderate AS (peak transvalvular velocity 3.58 m/s, mean transvalvular gradient 30.00 mmHg, aortic valve area 1.26 cm²).    Patient seen and examined at the bedside. No complaints at this time. AM labs s/f K 3.8 s/p 20meq KCl x1. No overnight events on tele, HD stable, AM ECG stable. L radial access site stable with distal pulses to baseline. As per Dr. Ballard, patient is stable for discharge home on Aspirin 81mg QD, Plavix 75mg QD, Amlodipine 5mg QD, Lasix 20mg QD, Zetia 10mg QD, Pantoprazole 40mg QD with instruction to follow up with Dr. Angel Mariscal in 1-2 weeks.     Reasons for No Cardiac Rehab Referral Rx (Must select 1 or more options):                Patient Refused            Medical Reason: ex needs Home Care, Home PT            Patient lacks medical coverage for Cardiac Rehab            Pt discharged to Nursing Care/[facility]/Long term Care Facility            Patient Lacks Transportation or no cardiac rehab within 60 minutes driving range            Patient already participates in Cardiac Rehab            Other: (provide details) ex: Hospice patient                        89 yo F, statin intolerance, w/ PMHx HTN, HLD, CAD (known severe mRCA dz), moderate AS, ?HFpEF (EF 60%), hx GI bleed (June 2021 @ [facility], found to have stomach mass) hx multiple vasovagal episodes, hypothyroidism, Asthma/COPD (last hospitalization years ago, denies intubations), Polymyalgia Rheumatica (on MTX and Prednisone) presented to outpatient cardiologist, Dr Mariscal, c/o worsening dyspnea on minimal exertion w/ associated dizziness and fatigue, sent to ER and r/o ACS. Pt ruled out and Dr. Mariscal/pt requesting cath over NST. GI consulted for DAPT clearance prior to cath, and cleared for DAPT as Hgb has remained stable, should remain on PPI 40 mg QD if continued on DAPT. GIST on EGD is low risk of bleeding, however would benefit from endoscopic evaluation with EGD/EUS as an outpatient with Dr. Liao to further elucidate lesion. S/p cardiac catheterization 5/2/22: CHETAN x 1 mRCA (90%), LM: luminal irregularities, mLAD: 40% calcified, pLCx: Luminal irregularities, EDP: 0. EF as per echo. L TR due at 8 PM. Echocardiogram 5/2/22: Normal left ventricular size and systolic function. Moderate AS (peak transvalvular velocity 3.58 m/s, mean transvalvular gradient 30.00 mmHg, aortic valve area 1.26 cm²).      Patient seen and examined at the bedside. No complaints at this time. AM labs s/f K 3.8 s/p 20meq KCl x1. No overnight events on tele, HD stable, AM ECG stable. L radial access site stable with distal pulses to baseline. As per Dr. Ballard, patient is stable for discharge home on Aspirin 81mg QD, Plavix 75mg QD, Amlodipine 5mg QD, Lasix 20mg QD, Zetia 10mg QD, Pantoprazole 40mg QD with instruction to follow up with Dr. Angel Mariscal in 1-2 weeks. PATIENT WAS ADMITTED FOR SHORTNESS OF BREATH DUE TO CORONARY ARTERY SEVERE DISEASE IN THE RCA.    Reasons for No Cardiac Rehab Referral Rx (Must select 1 or more options):                Patient Refused            Medical Reason: ex needs Home Care, Home PT            Patient lacks medical coverage for Cardiac Rehab            Pt discharged to Nursing Care/[facility]/Long term Care Facility            Patient Lacks Transportation or no cardiac rehab within 60 minutes driving range            Patient already participates in Cardiac Rehab            Other: (provide details) ex: Hospice patient

## 2022-05-02 NOTE — DISCHARGE NOTE PROVIDER - NSDCMRMEDTOKEN_GEN_ALL_CORE_FT
allopurinol 100 mg oral tablet: 1 tab(s) orally once a day  amLODIPine 5 mg oral tablet: 1 tab(s) orally once a day (in the evening)  Augmentin 500 mg-125 mg oral tablet: 1 tab(s) orally 2 times a day x 15 days   folic acid 1 mg oral tablet: 1 tab(s) orally once a day  furosemide 20 mg oral tablet: 1 tab(s) orally once a day  gabapentin 100 mg oral capsule: 1 tab(s) orally once a day (in the morning)  gabapentin 100 mg oral capsule: 1 tab(s) orally once a day (in the afternoon)  gabapentin 400 mg oral capsule: 1 tab(s) orally once a day (in the evening)  levothyroxine 100 mcg (0.1 mg) oral tablet: 1 tab(s) orally once a day  methotrexate 2.5 mg oral tablet: 1 tab(s) orally once a week on wednesdays  oxybutynin 5 mg oral tablet: 1 tab(s) orally once a day  potassium chloride 20 mEq oral tablet, extended release: 1 tab(s) orally once a day  raloxifene 60 mg oral tablet: 1 tab(s) orally once a day  tiZANidine 2 mg oral tablet: 1 tab(s) orally 2 times a day  Zetia 10 mg oral tablet: 1 tab(s) orally once a day   allopurinol 100 mg oral tablet: 1 tab(s) orally once a day  amLODIPine 5 mg oral tablet: 1 tab(s) orally once a day (in the evening)  Aspirin Enteric Coated 81 mg oral delayed release tablet: 1 tab(s) orally once a day  Augmentin 500 mg-125 mg oral tablet: 1 tab(s) orally 2 times a day x 15 days   clopidogrel 75 mg oral tablet: 1 tab(s) orally once a day  folic acid 1 mg oral tablet: 1 tab(s) orally once a day  furosemide 20 mg oral tablet: 1 tab(s) orally once a day  gabapentin 100 mg oral capsule: 1 tab(s) orally once a day (in the morning)  gabapentin 100 mg oral capsule: 1 tab(s) orally once a day (in the afternoon)  gabapentin 400 mg oral capsule: 1 tab(s) orally once a day (in the evening)  levothyroxine 100 mcg (0.1 mg) oral tablet: 1 tab(s) orally once a day  methotrexate 2.5 mg oral tablet: 1 tab(s) orally once a week on wednesdays  oxybutynin 5 mg oral tablet: 1 tab(s) orally once a day  pantoprazole 40 mg oral delayed release tablet: 1 tab(s) orally once a day (before a meal)  raloxifene 60 mg oral tablet: 1 tab(s) orally once a day  tiZANidine 2 mg oral tablet: 1 tab(s) orally 2 times a day  Zetia 10 mg oral tablet: 1 tab(s) orally once a day

## 2022-05-02 NOTE — DISCHARGE NOTE PROVIDER - NSDCFUSCHEDAPPT_GEN_ALL_CORE_FT
Angel Mariscal Physician [facility]  [facility] [address] [place] [address] En  Scheduled Appointment: 06/02/2022     Angel Mariscal Physician On license of [facility]  [facility] [address]  Scheduled Appointment: 05/16/2022

## 2022-05-02 NOTE — PROGRESS NOTE ADULT - NS ATTEND AMEND GEN_ALL_CORE FT
cp and sob  await echo  on gmdt  then cardiac catheterization
sob and dizzness  echo and cath in am   continue current medication regimen  suspect symptomatic valve disease
sob and dizziness  no jvd  clear lungs  heri throughout precordium  no edema  plan for echo  and cath per pcp

## 2022-05-02 NOTE — PROGRESS NOTE ADULT - PROBLEM SELECTOR PLAN 1
Patient presenting w/ hx dyspnea, acutely worsening w/ minimal exertion x past 7 weeks  -Troponin negative x 3   - EKG sinus @ 75bpm w/ 1st degree av block and nonspecific STT changes  - CXR clear;   - Pt w/ hx of HFpEF, w/ mild JVD and LE edema s/p lasix 20mg IVP x 1 in ED. no standing IV lasix at this time  - [facility] 9/2020 @ [facility] w/ severe midLAD dz  - Repeat echo ordered. Follow-up results.   - Patient with hx of GI bleed - per GI cleared for DAPT as Hgb remains stable; would recommend PPI if patient on DAPT  - Patient for cath with Dr. Saldana-patient prefers to have cath done instead of stress- - patient consented (in 5u office) and email sent to schedulers. NPO after breakfast   - Old note w/ concern for ASA allergy. Pt states no allergy and that she was told not to take it because of the GI bleed. ASA/Plavix load on table if needed given Anemia, history of GIB, GIST and elderly frail patient-increased risk of bleed Currently ASX, satting well on RA, euvolemic  - LHC 9/2020 @ [facility] w/ severe midRCA dz, moderate AS   -Echo 08/04/2020: EF 60%, heavily calcified AV with moderate AS (mean 30mmHg, peak 50mmHg, FANG 0.9cm2)  -Repeat echo ordered, f/u results   -GI consulted cleared for DAPT as Hgb remains stable; would recommend PPI if patient on DAPT  - Patient for cath with Dr. Saldana-patient prefers to have cath done instead of stress- - patient consented (in 5u office) and email sent to schedulers. NPO after breakfast   - Old note w/ concern for ASA allergy. Pt states no allergy and that she was told not to take it because of the GI bleed. ASA/Plavix load on table if needed given Anemia, history of GIB, GIST and elderly frail patient-increased risk of bleed Currently ASX, satting well on RA, euvolemic  -LHC 9/2020 @ [facility] w/ severe mRCA dz, moderate AS   -Echo 3/7/22 @ Dr Burton office w/ Normal LV/RV systolic fx LVEF 60%, Moderate AS  -Repeat echo ordered, f/u results   -NPO with plan for cath 5/2/22 with Dr. Saldana, f/u results Currently ASX, satting well on RA, euvolemic  -LHC 9/2020 @ [facility] w/ severe mRCA dz, moderate AS   -Echo 3/7/22 @ Dr Burton office w/ Normal LV/RV systolic fx LVEF 60%, Moderate AS  -Echocardiogram 5/2/22: Normal left ventricular size and systolic function. Moderate AS (peak transvalvular velocity 3.58 m/s, mean transvalvular gradient 30.00 mmHg, aortic valve area 1.26 cm²)  -NPO with plan for cath 5/2/22 with Dr. Saldana, f/u results

## 2022-05-02 NOTE — PROGRESS NOTE ADULT - PROBLEM SELECTOR PLAN 9
-hx COPD/asthma w/ no chronic home medications  -duonebs prn    F: none  E: K >4, Mg > 2  N: DASH/TLC  VTE PPx: Lovenox  Dispo: pending LHC    Lives at home w/ son and has HHA 5x weekly for 4h/day  walks w/ rollator/cane  f/u PT eval    Case discussed with Dr. Ballard -Duonebs prn    DVT PPX: Lovenox q 24hrs  Dispo: pending cath results  -- Lives at home w/ son and has HHA 5x weekly for 4h/day  Case d/w Dr. Ballard

## 2022-05-02 NOTE — DISCHARGE NOTE PROVIDER - NSDCCPCAREPLAN_GEN_ALL_CORE_FT
PRINCIPAL DISCHARGE DIAGNOSIS  Diagnosis: Dyspnea on exertion  Assessment and Plan of Treatment: You presented to the hospital for shortness of breath with ambulation. We ruled you out from a heart attack. Your heart ultrasound (echocardiogram) showed you still have moderate aortic stenosis. We preformed a cardiac catheterization which showed _____      SECONDARY DISCHARGE DIAGNOSES  Diagnosis: Hyperlipidemia  Assessment and Plan of Treatment: Please continue your home Zetia 10mg daily, there have been no changes    Diagnosis: History of GI bleed  Assessment and Plan of Treatment: Your blood count was stable on this admission. Our stomach doctors (Gastroenterologist) saw you and cleared you for aspirin and plavix  -We started you on a stomach coating medication called Pantoprazole 40mg daily which you should continue on discharge. This will help you avoid things like ulcers in the stomach.  -They also recommend you follow up with your GI doctor for further workup and repeat EGD    Diagnosis: HTN (hypertension)  Assessment and Plan of Treatment: Please continue your home Amlodipine 5mg daily and Lasix 20mg daily, there have been no changes.     PRINCIPAL DISCHARGE DIAGNOSIS  Diagnosis: Dyspnea on exertion  Assessment and Plan of Treatment: You presented to the hospital for shortness of breath with ambulation. We ruled you out from a heart attack. Your heart ultrasound (echocardiogram) showed you still have moderate aortic stenosis. We preformed a cardiac catheterization where we placed a stent in the middle Right Coronary Artery (RCA)  - You should continue taking aspirin 81mg daily and Plavix 75 mg daily.  -NEVER MISS A DOSE OF ASPIRIN OR PLAVIX; IF YOU DO, YOU ARE AT RISK OF YOUR STENT CLOSING AND HAVING A HEART ATTACK. DO NOT STOP THESE TWO MEDICATIONS UNLESS INSTRUCTED TO DO SO BY YOUR CARDIOLOGIST.  Aspirin and Plavix can put you at increased risk of bleeding; please avoid NSAIDS (such as Motrin, Advil, Ibuprofen, Naproxen, or Aleve, as these medications may further your risk of bleeding. Additionally, continue taking Pantoprazole 40mg daily to protect your stomach lining.   -Your procedure was done through your wrist  -You do not need to keep this area covered and you may shower  -Please avoid any heavy lifting  (no more than 3 to 5 lbs) or strenuous activity for five days  -If you develop any swelling, bleeding, hardening of the skin (hematoma formation), acute pain, numbness/tingling  in your arm or leg please contact your doctor immediately or call our 24/7 line: [phone]  -Please follow up with Dr. Mariscal in 1-2 weeks  Please return to the hospital or seek immediate medical attention if you have symptoms including, but not limited to, persistent chest pain or shortness of breath, especially if it’s associated with nausea, vomiting, sweating, passing out, or pain radiating to your jaw, shoulder, or arm.      SECONDARY DISCHARGE DIAGNOSES  Diagnosis: Hyperlipidemia  Assessment and Plan of Treatment: Please continue your home Zetia 10mg daily, there have been no changes    Diagnosis: History of GI bleed  Assessment and Plan of Treatment: Your blood count was stable on this admission. Our stomach doctors (Gastroenterologist) saw you and cleared you for aspirin and plavix  -We started you on a stomach coating medication called Pantoprazole 40mg daily which you should continue on discharge. This will help you avoid things like ulcers in the stomach.  -They also recommend you follow up with your GI doctor for further workup and repeat EGD    Diagnosis: HTN (hypertension)  Assessment and Plan of Treatment: Please continue your home Amlodipine 5mg daily and Lasix 20mg daily, there have been no changes.

## 2022-05-02 NOTE — PROGRESS NOTE ADULT - SUBJECTIVE AND OBJECTIVE BOX
Interventional Cardiology PA Adult Progress Note    Subjective Assessment:  	  MEDICATIONS:  amLODIPine   Tablet 5 milliGRAM(s) Oral at bedtime  furosemide    Tablet 20 milliGRAM(s) Oral daily  amoxicillin  500 milliGRAM(s)/clavulanate 1 Tablet(s) Oral two times a day  albuterol/ipratropium for Nebulization 3 milliLiter(s) Nebulizer every 6 hours PRN  gabapentin 400 milliGRAM(s) Oral at bedtime  gabapentin 100 milliGRAM(s) Oral daily  allopurinol 100 milliGRAM(s) Oral daily  dextrose 50% Injectable 25 Gram(s) IV Push once  glucagon  Injectable 1 milliGRAM(s) IntraMuscular once  levothyroxine 88 MICROGram(s) Oral daily  enoxaparin Injectable 40 milliGRAM(s) SubCutaneous every 24 hours  folic acid 1 milliGRAM(s) Oral daily  methotrexate 2.5 milliGRAM(s) Oral every week  oxybutynin 2.5 milliGRAM(s) Oral two times a day  raloxifene 60 milliGRAM(s) Oral daily    [PHYSICAL EXAM:  TELEMETRY:  T(C): 36.5 (05-02-22 @ 06:53), Max: 37 (05-01-22 @ 18:16)  HR: 88 (05-02-22 @ 08:46) (61 - 88)  BP: 139/78 (05-02-22 @ 08:46) (117/64 - 145/76)  RR: 16 (05-02-22 @ 08:46) (16 - 18)  SpO2: 96% (05-02-22 @ 08:46) (94% - 99%)  Wt(kg): --  I&O's Summary    01 May 2022 07:01  -  02 May 2022 07:00  --------------------------------------------------------  IN: 840 mL / OUT: 1125 mL / NET: -285 mL        Baird:  Central/PICC/Mid Line:                                         Appearance: Normal	  HEENT:   Normal oral mucosa, PERRL, EOMI	  Neck: Supple, + JVD/ - JVD; Carotid Bruit   Cardiovascular: Normal S1 S2, No JVD, No murmurs,   Respiratory: Lungs clear to auscultation/Decreased Breath Sounds/No Rales, Rhonchi, Wheezing	  Gastrointestinal:  Soft, Non-tender, + BS	  Skin: No rashes, No ecchymoses, No cyanosis  Extremities: Normal range of motion, No clubbing, cyanosis or edema  Vascular: Peripheral pulses palpable 2+ bilaterally  Neurologic: Non-focal  Psychiatry: A & O x 3, Mood & affect appropriate      	    ECG:  	  RADIOLOGY:   DIAGNOSTIC TESTING:  [ ] Echocardiogram:  [ ]  Catheterization:  [ ] Stress Test:    [ ] GLROIA  OTHER: 	    LABS:	 	  CARDIAC MARKERS:                                  9.9    4.97  )-----------( 219      ( 02 May 2022 05:43 )             31.0     05-02    141  |  109<H>  |  15  ----------------------------<  77  4.1   |  23  |  0.77    Ca    8.3<L>      02 May 2022 05:43  Mg     2.1     05-02    TPro  6.2  /  Alb  3.1<L>  /  TBili  0.4  /  DBili  x   /  AST  15  /  ALT  12  /  AlkPhos  52  05-01    proBNP:   Lipid Profile:   HgA1c:   TSH:       ASSESSMENT/PLAN: 	        DVT ppx:  Dispo:     Interventional Cardiology PA Adult Progress Note    Subjective Assessment: Patient seen and examined at the bedside. No complaints at this time. Aware of plan for cardiac catheterization today with Dr. Saldana. Denies any symptoms of chest pain, SOB, palpitations' All other ROS negative except those listed in subjective assessment.   	  MEDICATIONS:  amLODIPine   Tablet 5 milliGRAM(s) Oral at bedtime  furosemide    Tablet 20 milliGRAM(s) Oral daily  amoxicillin  500 milliGRAM(s)/clavulanate 1 Tablet(s) Oral two times a day  albuterol/ipratropium for Nebulization 3 milliLiter(s) Nebulizer every 6 hours PRN  gabapentin 400 milliGRAM(s) Oral at bedtime  gabapentin 100 milliGRAM(s) Oral daily  allopurinol 100 milliGRAM(s) Oral daily  dextrose 50% Injectable 25 Gram(s) IV Push once  glucagon  Injectable 1 milliGRAM(s) IntraMuscular once  levothyroxine 88 MICROGram(s) Oral daily  enoxaparin Injectable 40 milliGRAM(s) SubCutaneous every 24 hours  folic acid 1 milliGRAM(s) Oral daily  methotrexate 2.5 milliGRAM(s) Oral every week  oxybutynin 2.5 milliGRAM(s) Oral two times a day  raloxifene 60 milliGRAM(s) Oral daily    [PHYSICAL EXAM:  TELEMETRY:  T(C): 36.5 (05-02-22 @ 06:53), Max: 37 (05-01-22 @ 18:16)  HR: 88 (05-02-22 @ 08:46) (61 - 88)  BP: 139/78 (05-02-22 @ 08:46) (117/64 - 145/76)  RR: 16 (05-02-22 @ 08:46) (16 - 18)  SpO2: 96% (05-02-22 @ 08:46) (94% - 99%)  I&O's Summary    01 May 2022 07:01  -  02 May 2022 07:00  --------------------------------------------------------  IN: 840 mL / OUT: 1125 mL / NET: -285 mL    Appearance: Normal	  Neck: Supple, - JVD; no Carotid Bruit b/l  Cardiovascular: Normal S1 S2, No murmurs, rubs, gallops  Respiratory: Lungs clear to auscultation/No Rales, Rhonchi, Wheezing	  Gastrointestinal:  Soft, Non-tender, ND, + BS x4	  Extremities: Normal range of motion, No clubbing, cyanosis or edema b/l LE  Vascular: Peripheral pulses palpable 2+ bilaterally carotid, femoral, DP/PT. No carotids/femoral bruits.   Neurologic: Non-focal  Psychiatry: A & O x 3, Mood & affect appropriate      	    ECG:  	  RADIOLOGY:   DIAGNOSTIC TESTING:  [ ] Echocardiogram:  [ ]  Catheterization:  [ ] Stress Test:    [ ] GLORIA  OTHER: 	    LABS:	 	  CARDIAC MARKERS:                                  9.9    4.97  )-----------( 219      ( 02 May 2022 05:43 )             31.0     05-02    141  |  109<H>  |  15  ----------------------------<  77  4.1   |  23  |  0.77    Ca    8.3<L>      02 May 2022 05:43  Mg     2.1     05-02    TPro  6.2  /  Alb  3.1<L>  /  TBili  0.4  /  DBili  x   /  AST  15  /  ALT  12  /  AlkPhos  52  05-01    proBNP:   Lipid Profile:   HgA1c:   TSH:       ASSESSMENT/PLAN: 	        DVT ppx:  Dispo:     Interventional Cardiology PA Adult Progress Note    Subjective Assessment: Patient seen and examined at the bedside. No complaints at this time. Aware of plan for cardiac catheterization today with Dr. Saldana. Denies any symptoms of chest pain, SOB, palpitations' All other ROS negative except those listed in subjective assessment.   	  MEDICATIONS:  amLODIPine   Tablet 5 milliGRAM(s) Oral at bedtime  furosemide    Tablet 20 milliGRAM(s) Oral daily  amoxicillin  500 milliGRAM(s)/clavulanate 1 Tablet(s) Oral two times a day  albuterol/ipratropium for Nebulization 3 milliLiter(s) Nebulizer every 6 hours PRN  gabapentin 400 milliGRAM(s) Oral at bedtime  gabapentin 100 milliGRAM(s) Oral daily  allopurinol 100 milliGRAM(s) Oral daily  dextrose 50% Injectable 25 Gram(s) IV Push once  glucagon  Injectable 1 milliGRAM(s) IntraMuscular once  levothyroxine 88 MICROGram(s) Oral daily  enoxaparin Injectable 40 milliGRAM(s) SubCutaneous every 24 hours  folic acid 1 milliGRAM(s) Oral daily  methotrexate 2.5 milliGRAM(s) Oral every week  oxybutynin 2.5 milliGRAM(s) Oral two times a day  raloxifene 60 milliGRAM(s) Oral daily    [PHYSICAL EXAM:  TELEMETRY:  T(C): 36.5 (05-02-22 @ 06:53), Max: 37 (05-01-22 @ 18:16)  HR: 88 (05-02-22 @ 08:46) (61 - 88)  BP: 139/78 (05-02-22 @ 08:46) (117/64 - 145/76)  RR: 16 (05-02-22 @ 08:46) (16 - 18)  SpO2: 96% (05-02-22 @ 08:46) (94% - 99%)  I&O's Summary    01 May 2022 07:01  -  02 May 2022 07:00  --------------------------------------------------------  IN: 840 mL / OUT: 1125 mL / NET: -285 mL    Appearance: Normal	  Neck: Supple, - JVD; no Carotid Bruit b/l  Cardiovascular: Normal S1 S2, No murmurs, rubs, gallops  Respiratory: Lungs clear to auscultation/No Rales, Rhonchi, Wheezing	  Gastrointestinal:  Soft, Non-tender, ND, + BS x4	  Extremities: Normal range of motion, No clubbing, cyanosis or edema b/l LE  Vascular: Peripheral pulses palpable 2+ bilaterally carotid, femoral, DP/PT. No carotids/femoral bruits. R RADIAL NON-DOPPLERABLE. L radial 2+  Neurologic: Non-focal, A & O x 3, Mood & affect appropriate      	    ECG:  	  RADIOLOGY:   DIAGNOSTIC TESTING:  [ ] Echocardiogram:  [ ]  Catheterization:  [ ] Stress Test:    [ ] GLORIA  OTHER: 	    LABS:	 	  CARDIAC MARKERS:                      9.9    4.97  )-----------( 219      ( 02 May 2022 05:43 )             31.0     05-02    141  |  109<H>  |  15  ----------------------------<  77  4.1   |  23  |  0.77    Ca    8.3<L>      02 May 2022 05:43  Mg     2.1     05-02    TPro  6.2  /  Alb  3.1<L>  /  TBili  0.4  /  DBili  x   /  AST  15  /  ALT  12  /  AlkPhos  52  05-01       Interventional Cardiology PA Adult Progress Note    Subjective Assessment: Patient seen and examined at the bedside. No complaints at this time. Aware of plan for cardiac catheterization today with Dr. Saldana. Denies any symptoms of chest pain, SOB, palpitations' All other ROS negative except those listed in subjective assessment.   	  MEDICATIONS:  amLODIPine   Tablet 5 milliGRAM(s) Oral at bedtime  furosemide    Tablet 20 milliGRAM(s) Oral daily  amoxicillin  500 milliGRAM(s)/clavulanate 1 Tablet(s) Oral two times a day  albuterol/ipratropium for Nebulization 3 milliLiter(s) Nebulizer every 6 hours PRN  gabapentin 400 milliGRAM(s) Oral at bedtime  gabapentin 100 milliGRAM(s) Oral daily  allopurinol 100 milliGRAM(s) Oral daily  dextrose 50% Injectable 25 Gram(s) IV Push once  glucagon  Injectable 1 milliGRAM(s) IntraMuscular once  levothyroxine 88 MICROGram(s) Oral daily  enoxaparin Injectable 40 milliGRAM(s) SubCutaneous every 24 hours  folic acid 1 milliGRAM(s) Oral daily  methotrexate 2.5 milliGRAM(s) Oral every week  oxybutynin 2.5 milliGRAM(s) Oral two times a day  raloxifene 60 milliGRAM(s) Oral daily    [PHYSICAL EXAM:  TELEMETRY:  T(C): 36.5 (05-02-22 @ 06:53), Max: 37 (05-01-22 @ 18:16)  HR: 88 (05-02-22 @ 08:46) (61 - 88)  BP: 139/78 (05-02-22 @ 08:46) (117/64 - 145/76)  RR: 16 (05-02-22 @ 08:46) (16 - 18)  SpO2: 96% (05-02-22 @ 08:46) (94% - 99%)  I&O's Summary    01 May 2022 07:01  -  02 May 2022 07:00  --------------------------------------------------------  IN: 840 mL / OUT: 1125 mL / NET: -285 mL    Appearance: Normal	  Neck: Supple, - JVD; no Carotid Bruit b/l  Cardiovascular: Normal S1 S2, No rubs, gallops. Crescendo - decrescendo systolic ejection murmur   Respiratory: Lungs clear to auscultation/No Rales, Rhonchi, Wheezing	  Gastrointestinal:  Soft, Non-tender, ND, + BS x4	  Extremities: Normal range of motion, No clubbing, cyanosis or edema b/l LE  Vascular: Peripheral pulses palpable 2+ bilaterally carotid, femoral, DP/PT. No carotids/femoral bruits. R RADIAL NON-DOPPLERABLE. L radial 2+  Neurologic: Non-focal, A & O x 3, Mood & affect appropriate      	    ECG:  	  RADIOLOGY:   DIAGNOSTIC TESTING:  [ ] Echocardiogram:  [ ]  Catheterization:  [ ] Stress Test:    [ ] GLORIA  OTHER: 	    LABS:	 	  CARDIAC MARKERS:                      9.9    4.97  )-----------( 219      ( 02 May 2022 05:43 )             31.0     05-02    141  |  109<H>  |  15  ----------------------------<  77  4.1   |  23  |  0.77    Ca    8.3<L>      02 May 2022 05:43  Mg     2.1     05-02    TPro  6.2  /  Alb  3.1<L>  /  TBili  0.4  /  DBili  x   /  AST  15  /  ALT  12  /  AlkPhos  52  05-01

## 2022-05-02 NOTE — PROGRESS NOTE ADULT - PROBLEM SELECTOR PLAN 2
mild JVD, Otherwise euvolemic.   - CXR clear;   - Echo 3/7/22 @ Dr Burton office w/ Normal LV/RV systolic fx LVEF 60%. Mild LVH. Unspecified severity of LV diastolic dysfunction. Moderate AS.   - S/p lasix 20mg IVP x 1 in ED  - C/w home lasix 20mg PO daily  - Core measures, Strict I&O's, daily weight, fluid restriction  -IVF pre-cath to be ordered in AM after assessment given patient with moderate AS and required IV Lasix on admission. Currently euvolemic - ordered for pre cath IVF w/ frequent lung checks   - S/p lasix 20mg IVP x 1 in ED  - C/w home lasix 20mg PO daily  - Core measures, Strict I&O's, daily weight, fluid restriction

## 2022-05-02 NOTE — PROGRESS NOTE ADULT - ASSESSMENT
89 yo F, statin intolerance, w/ PMHx HTN, HLD, CAD (known severe mRCA dz), moderate AS, ?HFpEF (EF 60%), hx GI bleed (June 2021 @ [facility], found to have stomach mass) hx multiple vasovagal episodes, hypothyroidism, Asthma/COPD (last hospitalization years ago, denies intubations), Polymyalgia Rheumatica (on MTX and Prednisone) presented to outpatient cardiologist, Dr Mariscal, c/o worsening dyspnea on minimal exertion w/ associated dizziness and fatigue x last 7 weeks. Pt also endorses intermittent LE edema. Pt has known severe mRCA lesion as above, so was sent in to r/o ACS and Cardiac enzymes negative x 3.     Of note: Patient with DM in her history however patient denies and is not currently on DM medications. Hgb A1C 5.1 and patient refused Fingersticks. Fingersticks discontinued  89 yo F, statin intolerance, w/ PMHx HTN, HLD, CAD (known severe mRCA dz), moderate AS, ?HFpEF (EF 60%), hx GI bleed (June 2021 @ [facility], found to have stomach mass) hx multiple vasovagal episodes, hypothyroidism, Asthma/COPD (last hospitalization years ago, denies intubations), Polymyalgia Rheumatica (on MTX and Prednisone) presented to outpatient cardiologist, Dr Mariscal, c/o worsening dyspnea on minimal exertion w/ associated dizziness and fatigue, sent to ER and r/o ACS. Pt requesting cath over NST, now NPO for cath 5/2/22 with Dr. Saldana.

## 2022-05-02 NOTE — PROGRESS NOTE ADULT - PROBLEM SELECTOR PLAN 4
Patient recently diagnosed with a UTI - was placed on Augmentin 500-125 mg BID on 4/28/22 x 15 days  - Continue Augmentin  -Patient afebrile without leukocytosis on CBC Patient recently diagnosed with a UTI - was placed on Augmentin  -Continue Augmentin 500-125 mg BID on 4/28/22 x 15 daily

## 2022-05-02 NOTE — PROGRESS NOTE ADULT - TIME BILLING
cp due to CAD
shortness of breath and exertional dizziness
sob and dizziness will do echo   cleared by GI for DAPT   cath in am

## 2022-05-02 NOTE — DISCHARGE NOTE PROVIDER - CARE PROVIDERS DIRECT ADDRESSES
[email or web address]

## 2022-05-02 NOTE — DISCHARGE NOTE PROVIDER - PROVIDER TOKENS
PROVIDER:[TOKEN:[[id number]],FOLLOWUP:[2 weeks],ESTABLISHEDPATIENT:[T]] PROVIDER:[TOKEN:[[id number]],FOLLOWUP:[2 weeks],ESTABLISHEDPATIENT:[T]],PROVIDER:[TOKEN:[[id number]],FOLLOWUP:[2 weeks],ESTABLISHEDPATIENT:[T]]

## 2022-05-02 NOTE — DISCHARGE NOTE PROVIDER - CARE PROVIDER_API CALL
Angel Mariscal  CARDIOVASCULAR DISEASE  [address] [facility] [place] [address] [place] [address]  Phone: [phone]  Fax: [phone]  Established Patient  Follow Up Time: 2 weeks   Angel Mariscal  CARDIOVASCULAR DISEASE  [address] [facility] [place] [address] [place] [address]  Phone: [phone]  Fax: [phone]  Established Patient  Follow Up Time: 2 weeks    Tarah Liao  GASTROENTEROLOGY  [address], [place] [address]  Phone: [phone]  Fax: [phone]  Established Patient  Follow Up Time: 2 weeks

## 2022-05-02 NOTE — PROGRESS NOTE ADULT - PROBLEM SELECTOR PLAN 3
Hx GI bleed Jun 2021 @ [facility] requiring 3 units PRBCs  - s/p EGD at the time revealing stomach mass - no intervention at that time  - GI consulted -patient cleared for DAPT as Hgb has remained stable. If patient to be placed on DAPT recommended PPI 40 mg daily.   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 No signs of bleeding. Continue to monitor CBC. 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 GIST on EGD is low risk of bleeding, however would benefit from endoscopic evaluation with EGD/EUS as an outpatient with Dr Liao to further elucidate lesion   -Hgb stable at 9.9. No signs of bleeding. Maintain Active Type and Screen

## 2022-05-03 ENCOUNTER — TRANSCRIPTION ENCOUNTER (OUTPATIENT)
Age: 87
End: 2022-05-03

## 2022-05-03 VITALS — TEMPERATURE: 98 F

## 2022-05-03 LAB
ANION GAP SERPL CALC-SCNC: 11 MMOL/L — SIGNIFICANT CHANGE UP (ref 5–17)
BLD GP AB SCN SERPL QL: NEGATIVE — SIGNIFICANT CHANGE UP
BUN SERPL-MCNC: 11 MG/DL — SIGNIFICANT CHANGE UP (ref 7–23)
CALCIUM SERPL-MCNC: 8.4 MG/DL — SIGNIFICANT CHANGE UP (ref 8.4–10.5)
CHLORIDE SERPL-SCNC: 108 MMOL/L — SIGNIFICANT CHANGE UP (ref 96–108)
CO2 SERPL-SCNC: 20 MMOL/L — LOW (ref 22–31)
CREAT SERPL-MCNC: 0.71 MG/DL — SIGNIFICANT CHANGE UP (ref 0.5–1.3)
EGFR: 79 ML/MIN/1.73M2 — SIGNIFICANT CHANGE UP
GLUCOSE SERPL-MCNC: 77 MG/DL — SIGNIFICANT CHANGE UP (ref 70–99)
HCT VFR BLD CALC: 34.9 % — SIGNIFICANT CHANGE UP (ref 34.5–45)
HGB BLD-MCNC: 11.1 G/DL — LOW (ref 11.5–15.5)
MAGNESIUM SERPL-MCNC: 2.2 MG/DL — SIGNIFICANT CHANGE UP (ref 1.6–2.6)
MCHC RBC-ENTMCNC: 30.3 PG — SIGNIFICANT CHANGE UP (ref 27–34)
MCHC RBC-ENTMCNC: 31.8 GM/DL — LOW (ref 32–36)
MCV RBC AUTO: 95.4 FL — SIGNIFICANT CHANGE UP (ref 80–100)
NRBC # BLD: 0 /100 WBCS — SIGNIFICANT CHANGE UP (ref 0–0)
PLATELET # BLD AUTO: 195 K/UL — SIGNIFICANT CHANGE UP (ref 150–400)
POTASSIUM SERPL-MCNC: 3.8 MMOL/L — SIGNIFICANT CHANGE UP (ref 3.5–5.3)
POTASSIUM SERPL-SCNC: 3.8 MMOL/L — SIGNIFICANT CHANGE UP (ref 3.5–5.3)
RBC # BLD: 3.66 M/UL — LOW (ref 3.8–5.2)
RBC # FLD: 15.8 % — HIGH (ref 10.3–14.5)
RH IG SCN BLD-IMP: POSITIVE — SIGNIFICANT CHANGE UP
SODIUM SERPL-SCNC: 139 MMOL/L — SIGNIFICANT CHANGE UP (ref 135–145)
WBC # BLD: 4.31 K/UL — SIGNIFICANT CHANGE UP (ref 3.8–10.5)
WBC # FLD AUTO: 4.31 K/UL — SIGNIFICANT CHANGE UP (ref 3.8–10.5)

## 2022-05-03 PROCEDURE — 84484 ASSAY OF TROPONIN QUANT: CPT

## 2022-05-03 PROCEDURE — 87635 SARS-COV-2 COVID-19 AMP PRB: CPT

## 2022-05-03 PROCEDURE — 84443 ASSAY THYROID STIM HORMONE: CPT

## 2022-05-03 PROCEDURE — 85025 COMPLETE CBC W/AUTO DIFF WBC: CPT

## 2022-05-03 PROCEDURE — 85730 THROMBOPLASTIN TIME PARTIAL: CPT

## 2022-05-03 PROCEDURE — 84480 ASSAY TRIIODOTHYRONINE (T3): CPT

## 2022-05-03 PROCEDURE — 80048 BASIC METABOLIC PNL TOTAL CA: CPT

## 2022-05-03 PROCEDURE — 36415 COLL VENOUS BLD VENIPUNCTURE: CPT

## 2022-05-03 PROCEDURE — 83880 ASSAY OF NATRIURETIC PEPTIDE: CPT

## 2022-05-03 PROCEDURE — 85610 PROTHROMBIN TIME: CPT

## 2022-05-03 PROCEDURE — 99285 EMERGENCY DEPT VISIT HI MDM: CPT | Mod: 25

## 2022-05-03 PROCEDURE — 84439 ASSAY OF FREE THYROXINE: CPT

## 2022-05-03 PROCEDURE — 83036 HEMOGLOBIN GLYCOSYLATED A1C: CPT

## 2022-05-03 PROCEDURE — C1874: CPT

## 2022-05-03 PROCEDURE — C1887: CPT

## 2022-05-03 PROCEDURE — 80053 COMPREHEN METABOLIC PANEL: CPT

## 2022-05-03 PROCEDURE — 93005 ELECTROCARDIOGRAM TRACING: CPT

## 2022-05-03 PROCEDURE — 86900 BLOOD TYPING SEROLOGIC ABO: CPT

## 2022-05-03 PROCEDURE — 82553 CREATINE MB FRACTION: CPT

## 2022-05-03 PROCEDURE — 82550 ASSAY OF CK (CPK): CPT

## 2022-05-03 PROCEDURE — 83735 ASSAY OF MAGNESIUM: CPT

## 2022-05-03 PROCEDURE — 86850 RBC ANTIBODY SCREEN: CPT

## 2022-05-03 PROCEDURE — 86901 BLOOD TYPING SEROLOGIC RH(D): CPT

## 2022-05-03 PROCEDURE — C1894: CPT

## 2022-05-03 PROCEDURE — 85027 COMPLETE CBC AUTOMATED: CPT

## 2022-05-03 PROCEDURE — 97161 PT EVAL LOW COMPLEX 20 MIN: CPT

## 2022-05-03 PROCEDURE — 93306 TTE W/DOPPLER COMPLETE: CPT

## 2022-05-03 PROCEDURE — 71045 X-RAY EXAM CHEST 1 VIEW: CPT

## 2022-05-03 PROCEDURE — 80061 LIPID PANEL: CPT

## 2022-05-03 PROCEDURE — C1769: CPT

## 2022-05-03 PROCEDURE — 99233 SBSQ HOSP IP/OBS HIGH 50: CPT

## 2022-05-03 PROCEDURE — C1725: CPT

## 2022-05-03 PROCEDURE — 82962 GLUCOSE BLOOD TEST: CPT

## 2022-05-03 RX ORDER — POTASSIUM CHLORIDE 20 MEQ
20 PACKET (EA) ORAL ONCE
Refills: 0 | Status: COMPLETED | OUTPATIENT
Start: 2022-05-03 | End: 2022-05-03

## 2022-05-03 RX ORDER — ASPIRIN/CALCIUM CARB/MAGNESIUM 324 MG
1 TABLET ORAL
Qty: 30 | Refills: 11
Start: 2022-05-03 | End: 2023-04-27

## 2022-05-03 RX ORDER — CLOPIDOGREL BISULFATE 75 MG/1
1 TABLET, FILM COATED ORAL
Qty: 30 | Refills: 11
Start: 2022-05-03 | End: 2023-04-27

## 2022-05-03 RX ORDER — POTASSIUM CHLORIDE 20 MEQ
1 PACKET (EA) ORAL
Qty: 0 | Refills: 0 | DISCHARGE

## 2022-05-03 RX ORDER — PANTOPRAZOLE SODIUM 20 MG/1
1 TABLET, DELAYED RELEASE ORAL
Qty: 30 | Refills: 11
Start: 2022-05-03 | End: 2023-04-27

## 2022-05-03 RX ADMIN — Medication 1 TABLET(S): at 10:06

## 2022-05-03 RX ADMIN — Medication 20 MILLIEQUIVALENT(S): at 10:38

## 2022-05-03 RX ADMIN — Medication 100 MILLIGRAM(S): at 12:19

## 2022-05-03 RX ADMIN — Medication 88 MICROGRAM(S): at 06:14

## 2022-05-03 RX ADMIN — Medication 2.5 MILLIGRAM(S): at 10:07

## 2022-05-03 RX ADMIN — RALOXIFENE HYDROCHLORIDE 60 MILLIGRAM(S): 60 TABLET, COATED ORAL at 12:19

## 2022-05-03 RX ADMIN — GABAPENTIN 100 MILLIGRAM(S): 400 CAPSULE ORAL at 12:19

## 2022-05-03 RX ADMIN — PANTOPRAZOLE SODIUM 40 MILLIGRAM(S): 20 TABLET, DELAYED RELEASE ORAL at 06:15

## 2022-05-03 RX ADMIN — CLOPIDOGREL BISULFATE 75 MILLIGRAM(S): 75 TABLET, FILM COATED ORAL at 13:43

## 2022-05-03 RX ADMIN — Medication 1 MILLIGRAM(S): at 12:19

## 2022-05-03 RX ADMIN — Medication 81 MILLIGRAM(S): at 13:43

## 2022-05-03 RX ADMIN — Medication 20 MILLIGRAM(S): at 10:07

## 2022-05-03 NOTE — DISCHARGE NOTE NURSING/CASE MANAGEMENT/SOCIAL WORK - PATIENT PORTAL LINK FT
You can access the FollowMyHealth Patient Portal offered by [facility] by registering at the following website: [email or web address]. By joining [facility]’s FollowMyHealth portal, you will also be able to view your health information using other applications (apps) compatible with our system.

## 2022-05-03 NOTE — DISCHARGE NOTE NURSING/CASE MANAGEMENT/SOCIAL WORK - NSDCPEFALRISK_GEN_ALL_CORE
For information on Fall & Injury Prevention, visit: [email or web address] OR  [email or web address] OR  https://www.cdc.gov/steadi/patient.html

## 2022-05-15 DIAGNOSIS — I11.0 HYPERTENSIVE HEART DISEASE WITH HEART FAILURE: ICD-10-CM

## 2022-05-15 DIAGNOSIS — K29.70 GASTRITIS, UNSPECIFIED, WITHOUT BLEEDING: ICD-10-CM

## 2022-05-15 DIAGNOSIS — I25.84 CORONARY ATHEROSCLEROSIS DUE TO CALCIFIED CORONARY LESION: ICD-10-CM

## 2022-05-15 DIAGNOSIS — I35.0 NONRHEUMATIC AORTIC (VALVE) STENOSIS: ICD-10-CM

## 2022-05-15 DIAGNOSIS — Z79.52 LONG TERM (CURRENT) USE OF SYSTEMIC STEROIDS: ICD-10-CM

## 2022-05-15 DIAGNOSIS — R06.00 DYSPNEA, UNSPECIFIED: ICD-10-CM

## 2022-05-15 DIAGNOSIS — J44.9 CHRONIC OBSTRUCTIVE PULMONARY DISEASE, UNSPECIFIED: ICD-10-CM

## 2022-05-15 DIAGNOSIS — E03.9 HYPOTHYROIDISM, UNSPECIFIED: ICD-10-CM

## 2022-05-15 DIAGNOSIS — I50.32 CHRONIC DIASTOLIC (CONGESTIVE) HEART FAILURE: ICD-10-CM

## 2022-05-15 DIAGNOSIS — I25.110 ATHEROSCLEROTIC HEART DISEASE OF NATIVE CORONARY ARTERY WITH UNSTABLE ANGINA PECTORIS: ICD-10-CM

## 2022-05-15 DIAGNOSIS — K57.90 DIVERTICULOSIS OF INTESTINE, PART UNSPECIFIED, WITHOUT PERFORATION OR ABSCESS WITHOUT BLEEDING: ICD-10-CM

## 2022-05-15 DIAGNOSIS — C49.A2 GASTROINTESTINAL STROMAL TUMOR OF STOMACH: ICD-10-CM

## 2022-05-15 DIAGNOSIS — N39.0 URINARY TRACT INFECTION, SITE NOT SPECIFIED: ICD-10-CM

## 2022-05-15 DIAGNOSIS — E78.5 HYPERLIPIDEMIA, UNSPECIFIED: ICD-10-CM

## 2022-05-15 DIAGNOSIS — M35.3 POLYMYALGIA RHEUMATICA: ICD-10-CM

## 2022-05-16 ENCOUNTER — APPOINTMENT (OUTPATIENT)
Dept: HEART AND VASCULAR | Facility: CLINIC | Age: 87
End: 2022-05-16
Payer: MEDICARE

## 2022-05-16 VITALS
HEART RATE: 70 BPM | HEIGHT: 59 IN | BODY MASS INDEX: 23.99 KG/M2 | TEMPERATURE: 98 F | SYSTOLIC BLOOD PRESSURE: 108 MMHG | DIASTOLIC BLOOD PRESSURE: 60 MMHG | WEIGHT: 119 LBS | OXYGEN SATURATION: 99 %

## 2022-05-16 DIAGNOSIS — R06.00 DYSPNEA, UNSPECIFIED: ICD-10-CM

## 2022-05-16 DIAGNOSIS — Z95.820 PERIPHERAL VASCULAR ANGIOPLASTY STATUS WITH IMPLANTS AND GRAFTS: ICD-10-CM

## 2022-05-16 DIAGNOSIS — I35.0 NONRHEUMATIC AORTIC (VALVE) STENOSIS: ICD-10-CM

## 2022-05-16 DIAGNOSIS — I25.10 ATHEROSCLEROTIC HEART DISEASE OF NATIVE CORONARY ARTERY W/OUT ANGINA PECTORIS: ICD-10-CM

## 2022-05-16 DIAGNOSIS — E78.5 HYPERLIPIDEMIA, UNSPECIFIED: ICD-10-CM

## 2022-05-16 DIAGNOSIS — R94.31 ABNORMAL ELECTROCARDIOGRAM [ECG] [EKG]: ICD-10-CM

## 2022-05-16 DIAGNOSIS — I50.30 UNSPECIFIED DIASTOLIC (CONGESTIVE) HEART FAILURE: ICD-10-CM

## 2022-05-16 PROCEDURE — 99214 OFFICE O/P EST MOD 30 MIN: CPT

## 2022-05-16 PROCEDURE — 93000 ELECTROCARDIOGRAM COMPLETE: CPT

## 2022-05-17 PROBLEM — I50.30 DIASTOLIC CONGESTIVE HEART FAILURE, UNSPECIFIED HF CHRONICITY: Status: ACTIVE | Noted: 2022-05-17

## 2022-05-17 PROBLEM — I25.10 CAD (CORONARY ARTERY DISEASE): Status: ACTIVE | Noted: 2021-07-24

## 2022-05-17 PROBLEM — I35.0 AORTIC STENOSIS: Status: ACTIVE | Noted: 2017-09-06

## 2022-05-17 PROBLEM — Z95.820 S/P ANGIOPLASTY WITH STENT: Status: ACTIVE | Noted: 2022-05-17

## 2022-05-17 PROBLEM — R94.31 ABNORMAL ECG: Status: ACTIVE | Noted: 2021-07-24

## 2022-05-17 PROBLEM — I35.0 AORTIC STENOSIS, MODERATE: Status: ACTIVE | Noted: 2022-03-08

## 2022-05-17 NOTE — REVIEW OF SYSTEMS
[Weight Gain (___ Lbs)] : [unfilled] ~Ulb weight gain [Feeling Fatigued] : not feeling fatigued [Dyspnea on exertion] : dyspnea during exertion [Lower Ext Edema] : lower extremity edema [Dizziness] : dizziness [Negative] : Heme/Lymph

## 2022-05-17 NOTE — HISTORY OF PRESENT ILLNESS
[FreeTextEntry1] : patient returns one week post  PTCA /CHETAN of mRCA  with improvement in her energy and breathing already\par ready\par \par cathed via left radial  approach\par \par Still has  modest  dyspnea \par \par No bleeding from any sites and no GI distress  on PPI \par \par EKG today shows  NSR 62 bpm  with nonspecific T wave flattening  and 1st degree AVB  without ectopy, ischemia or LVH  \par \par BP has been low according to the caretaker who monitors her BP -  sometimes SBP in the high 80s

## 2022-05-17 NOTE — PHYSICAL EXAM
[Well Developed] : well developed [Well Nourished] : well nourished [No Acute Distress] : no acute distress [Normal Conjunctiva] : normal conjunctiva [No Xanthelasma] : no xanthelasma [Normal Venous Pressure] : normal venous pressure [No Carotid Bruit] : no carotid bruit [Normal S1, S2] : normal S1, S2 [No Rub] : no rub [No Gallop] : no gallop [Clear Lung Fields] : clear lung fields [Good Air Entry] : good air entry [No Respiratory Distress] : no respiratory distress  [Soft] : abdomen soft [Non Tender] : non-tender [No Masses/organomegaly] : no masses/organomegaly [Normal Bowel Sounds] : normal bowel sounds [Abnormal Gait] : abnormal gait [No Cyanosis] : no cyanosis [No Clubbing] : no clubbing [No Varicosities] : no varicosities [Edema ___] : edema [unfilled] [No Rash] : no rash [No Skin Lesions] : no skin lesions [Moves all extremities] : moves all extremities [No Focal Deficits] : no focal deficits [Normal Speech] : normal speech [Alert and Oriented] : alert and oriented [Normal memory] : normal memory [de-identified] : anicteric  [de-identified] : 2/6 sm base  [de-identified] : walks with walker

## 2022-05-17 NOTE — REASON FOR VISIT
[Symptom and Test Evaluation] : symptom and test evaluation [Structural Heart and Valve Disease] : structural heart and valve disease [Hyperlipidemia] : hyperlipidemia [Hypertension] : hypertension [Coronary Artery Disease] : coronary artery disease [Formal Caregiver] : formal caregiver [FreeTextEntry1] : 89   year old -American female with increasing dyspnea on exertion,   far back as March has past hx of  multiple vasovagal  syncopal episodes. \par \par She has moderate aortic stenosis identified by echocardiogram  with normal LV function  and  severe  mRCA stenosis identified prior to Covid epidemic \par \par \par She is statin intolerant

## 2022-05-17 NOTE — DISCUSSION/SUMMARY
[Moderate Aortic Stenosis] : moderate aortic stenosis [None] : There are no changes in medication management [Coronary Artery Disease] : coronary artery disease [Stable] : stable [Responding to Treatment] : responding to treatment [With Me] : with me [___ Month(s)] : in [unfilled] month(s) [de-identified] : stop amlodipine  [FreeTextEntry1] : stop amlodipine \par \par monitor BP  and assess response of edema to cessation of amlodipine\par \par Advised to walk daily with assistance \par \par Continue PPI  for GI protection

## 2022-05-17 NOTE — ASSESSMENT
[FreeTextEntry1] : stable  s/p PTCA/CHETAN of mRCA\par \par moderate aortic stenosis \par \par Low BP

## 2022-05-24 ENCOUNTER — NON-APPOINTMENT (OUTPATIENT)
Age: 87
End: 2022-05-24

## 2022-09-24 ENCOUNTER — INPATIENT (INPATIENT)
Facility: HOSPITAL | Age: 87
LOS: 5 days | Discharge: HOME | DRG: 377 | End: 2022-09-30
Attending: STUDENT IN AN ORGANIZED HEALTH CARE EDUCATION/TRAINING PROGRAM | Admitting: SURGERY
Payer: MEDICARE

## 2022-09-24 VITALS
TEMPERATURE: 98 F | DIASTOLIC BLOOD PRESSURE: 84 MMHG | HEIGHT: 60 IN | OXYGEN SATURATION: 97 % | RESPIRATION RATE: 16 BRPM | WEIGHT: 113.98 LBS | HEART RATE: 91 BPM | SYSTOLIC BLOOD PRESSURE: 146 MMHG

## 2022-09-24 DIAGNOSIS — Z98.890 OTHER SPECIFIED POSTPROCEDURAL STATES: Chronic | ICD-10-CM

## 2022-09-24 LAB
ALBUMIN SERPL ELPH-MCNC: 3.8 G/DL — SIGNIFICANT CHANGE UP (ref 3.3–5)
ALP SERPL-CCNC: 59 U/L — SIGNIFICANT CHANGE UP (ref 40–120)
ALT FLD-CCNC: 9 U/L — LOW (ref 10–45)
ANION GAP SERPL CALC-SCNC: 10 MMOL/L — SIGNIFICANT CHANGE UP (ref 5–17)
ANION GAP SERPL CALC-SCNC: 8 MMOL/L — SIGNIFICANT CHANGE UP (ref 5–17)
APPEARANCE UR: CLEAR — SIGNIFICANT CHANGE UP
APTT BLD: 26.1 SEC — LOW (ref 27.5–35.5)
AST SERPL-CCNC: 17 U/L — SIGNIFICANT CHANGE UP (ref 10–40)
BACTERIA # UR AUTO: PRESENT /HPF
BASOPHILS # BLD AUTO: 0.03 K/UL — SIGNIFICANT CHANGE UP (ref 0–0.2)
BASOPHILS NFR BLD AUTO: 0.6 % — SIGNIFICANT CHANGE UP (ref 0–2)
BILIRUB SERPL-MCNC: 0.7 MG/DL — SIGNIFICANT CHANGE UP (ref 0.2–1.2)
BILIRUB UR-MCNC: NEGATIVE — SIGNIFICANT CHANGE UP
BLD GP AB SCN SERPL QL: NEGATIVE — SIGNIFICANT CHANGE UP
BUN SERPL-MCNC: 13 MG/DL — SIGNIFICANT CHANGE UP (ref 7–23)
BUN SERPL-MCNC: 15 MG/DL — SIGNIFICANT CHANGE UP (ref 7–23)
CALCIUM SERPL-MCNC: 7.5 MG/DL — LOW (ref 8.4–10.5)
CALCIUM SERPL-MCNC: 8.2 MG/DL — LOW (ref 8.4–10.5)
CHLORIDE SERPL-SCNC: 108 MMOL/L — SIGNIFICANT CHANGE UP (ref 96–108)
CHLORIDE SERPL-SCNC: 112 MMOL/L — HIGH (ref 96–108)
CO2 SERPL-SCNC: 21 MMOL/L — LOW (ref 22–31)
CO2 SERPL-SCNC: 26 MMOL/L — SIGNIFICANT CHANGE UP (ref 22–31)
COLOR SPEC: YELLOW — SIGNIFICANT CHANGE UP
CREAT SERPL-MCNC: 0.7 MG/DL — SIGNIFICANT CHANGE UP (ref 0.5–1.3)
CREAT SERPL-MCNC: 0.74 MG/DL — SIGNIFICANT CHANGE UP (ref 0.5–1.3)
DIFF PNL FLD: ABNORMAL
EGFR: 75 ML/MIN/1.73M2 — SIGNIFICANT CHANGE UP
EGFR: 80 ML/MIN/1.73M2 — SIGNIFICANT CHANGE UP
EOSINOPHIL # BLD AUTO: 0.28 K/UL — SIGNIFICANT CHANGE UP (ref 0–0.5)
EOSINOPHIL NFR BLD AUTO: 5.5 % — SIGNIFICANT CHANGE UP (ref 0–6)
EPI CELLS # UR: SIGNIFICANT CHANGE UP /HPF (ref 0–5)
GLUCOSE BLDC GLUCOMTR-MCNC: 108 MG/DL — HIGH (ref 70–99)
GLUCOSE BLDC GLUCOMTR-MCNC: 89 MG/DL — SIGNIFICANT CHANGE UP (ref 70–99)
GLUCOSE SERPL-MCNC: 101 MG/DL — HIGH (ref 70–99)
GLUCOSE SERPL-MCNC: 83 MG/DL — SIGNIFICANT CHANGE UP (ref 70–99)
GLUCOSE UR QL: NEGATIVE — SIGNIFICANT CHANGE UP
HCT VFR BLD CALC: 15.2 % — CRITICAL LOW (ref 34.5–45)
HCT VFR BLD CALC: 20 % — CRITICAL LOW (ref 34.5–45)
HCT VFR BLD CALC: 27.4 % — LOW (ref 34.5–45)
HGB BLD-MCNC: 4.6 G/DL — CRITICAL LOW (ref 11.5–15.5)
HGB BLD-MCNC: 6.5 G/DL — CRITICAL LOW (ref 11.5–15.5)
HGB BLD-MCNC: 8.8 G/DL — LOW (ref 11.5–15.5)
IMM GRANULOCYTES NFR BLD AUTO: 0.4 % — SIGNIFICANT CHANGE UP (ref 0–0.9)
INR BLD: 1.12 — SIGNIFICANT CHANGE UP (ref 0.88–1.16)
KETONES UR-MCNC: NEGATIVE — SIGNIFICANT CHANGE UP
LEUKOCYTE ESTERASE UR-ACNC: ABNORMAL
LIDOCAIN IGE QN: 16 U/L — SIGNIFICANT CHANGE UP (ref 7–60)
LYMPHOCYTES # BLD AUTO: 1.41 K/UL — SIGNIFICANT CHANGE UP (ref 1–3.3)
LYMPHOCYTES # BLD AUTO: 27.9 % — SIGNIFICANT CHANGE UP (ref 13–44)
MCHC RBC-ENTMCNC: 29.9 PG — SIGNIFICANT CHANGE UP (ref 27–34)
MCHC RBC-ENTMCNC: 30.1 PG — SIGNIFICANT CHANGE UP (ref 27–34)
MCHC RBC-ENTMCNC: 30.2 PG — SIGNIFICANT CHANGE UP (ref 27–34)
MCHC RBC-ENTMCNC: 30.3 GM/DL — LOW (ref 32–36)
MCHC RBC-ENTMCNC: 32.1 GM/DL — SIGNIFICANT CHANGE UP (ref 32–36)
MCHC RBC-ENTMCNC: 32.5 GM/DL — SIGNIFICANT CHANGE UP (ref 32–36)
MCV RBC AUTO: 93 FL — SIGNIFICANT CHANGE UP (ref 80–100)
MCV RBC AUTO: 93.8 FL — SIGNIFICANT CHANGE UP (ref 80–100)
MCV RBC AUTO: 98.7 FL — SIGNIFICANT CHANGE UP (ref 80–100)
MONOCYTES # BLD AUTO: 0.39 K/UL — SIGNIFICANT CHANGE UP (ref 0–0.9)
MONOCYTES NFR BLD AUTO: 7.7 % — SIGNIFICANT CHANGE UP (ref 2–14)
NEUTROPHILS # BLD AUTO: 2.92 K/UL — SIGNIFICANT CHANGE UP (ref 1.8–7.4)
NEUTROPHILS NFR BLD AUTO: 57.9 % — SIGNIFICANT CHANGE UP (ref 43–77)
NITRITE UR-MCNC: NEGATIVE — SIGNIFICANT CHANGE UP
NRBC # BLD: 0 /100 WBCS — SIGNIFICANT CHANGE UP (ref 0–0)
PH UR: 7.5 — SIGNIFICANT CHANGE UP (ref 5–8)
PLATELET # BLD AUTO: 121 K/UL — LOW (ref 150–400)
PLATELET # BLD AUTO: 164 K/UL — SIGNIFICANT CHANGE UP (ref 150–400)
PLATELET # BLD AUTO: 190 K/UL — SIGNIFICANT CHANGE UP (ref 150–400)
POTASSIUM SERPL-MCNC: 4.2 MMOL/L — SIGNIFICANT CHANGE UP (ref 3.5–5.3)
POTASSIUM SERPL-MCNC: 4.3 MMOL/L — SIGNIFICANT CHANGE UP (ref 3.5–5.3)
POTASSIUM SERPL-SCNC: 4.2 MMOL/L — SIGNIFICANT CHANGE UP (ref 3.5–5.3)
POTASSIUM SERPL-SCNC: 4.3 MMOL/L — SIGNIFICANT CHANGE UP (ref 3.5–5.3)
PROT SERPL-MCNC: 6.6 G/DL — SIGNIFICANT CHANGE UP (ref 6–8.3)
PROT UR-MCNC: NEGATIVE MG/DL — SIGNIFICANT CHANGE UP
PROTHROM AB SERPL-ACNC: 13.3 SEC — SIGNIFICANT CHANGE UP (ref 10.5–13.4)
RBC # BLD: 1.54 M/UL — LOW (ref 3.8–5.2)
RBC # BLD: 2.15 M/UL — LOW (ref 3.8–5.2)
RBC # BLD: 2.92 M/UL — LOW (ref 3.8–5.2)
RBC # FLD: 16 % — HIGH (ref 10.3–14.5)
RBC # FLD: 16.4 % — HIGH (ref 10.3–14.5)
RBC # FLD: 16.9 % — HIGH (ref 10.3–14.5)
RBC CASTS # UR COMP ASSIST: < 5 /HPF — SIGNIFICANT CHANGE UP
RH IG SCN BLD-IMP: POSITIVE — SIGNIFICANT CHANGE UP
SARS-COV-2 RNA SPEC QL NAA+PROBE: NEGATIVE — SIGNIFICANT CHANGE UP
SODIUM SERPL-SCNC: 142 MMOL/L — SIGNIFICANT CHANGE UP (ref 135–145)
SODIUM SERPL-SCNC: 143 MMOL/L — SIGNIFICANT CHANGE UP (ref 135–145)
SP GR SPEC: 1.01 — SIGNIFICANT CHANGE UP (ref 1–1.03)
UROBILINOGEN FLD QL: 0.2 E.U./DL — SIGNIFICANT CHANGE UP
WBC # BLD: 4.19 K/UL — SIGNIFICANT CHANGE UP (ref 3.8–10.5)
WBC # BLD: 5.05 K/UL — SIGNIFICANT CHANGE UP (ref 3.8–10.5)
WBC # BLD: 5.5 K/UL — SIGNIFICANT CHANGE UP (ref 3.8–10.5)
WBC # FLD AUTO: 4.19 K/UL — SIGNIFICANT CHANGE UP (ref 3.8–10.5)
WBC # FLD AUTO: 5.05 K/UL — SIGNIFICANT CHANGE UP (ref 3.8–10.5)
WBC # FLD AUTO: 5.5 K/UL — SIGNIFICANT CHANGE UP (ref 3.8–10.5)
WBC UR QL: > 10 /HPF

## 2022-09-24 PROCEDURE — 99223 1ST HOSP IP/OBS HIGH 75: CPT

## 2022-09-24 PROCEDURE — 71045 X-RAY EXAM CHEST 1 VIEW: CPT | Mod: 26

## 2022-09-24 PROCEDURE — 99285 EMERGENCY DEPT VISIT HI MDM: CPT

## 2022-09-24 PROCEDURE — 99221 1ST HOSP IP/OBS SF/LOW 40: CPT

## 2022-09-24 RX ORDER — GLUCAGON INJECTION, SOLUTION 0.5 MG/.1ML
1 INJECTION, SOLUTION SUBCUTANEOUS ONCE
Refills: 0 | Status: DISCONTINUED | OUTPATIENT
Start: 2022-09-24 | End: 2022-09-30

## 2022-09-24 RX ORDER — PANTOPRAZOLE SODIUM 20 MG/1
40 TABLET, DELAYED RELEASE ORAL
Refills: 0 | Status: DISCONTINUED | OUTPATIENT
Start: 2022-09-24 | End: 2022-09-30

## 2022-09-24 RX ORDER — INSULIN LISPRO 100/ML
VIAL (ML) SUBCUTANEOUS AT BEDTIME
Refills: 0 | Status: DISCONTINUED | OUTPATIENT
Start: 2022-09-24 | End: 2022-09-30

## 2022-09-24 RX ORDER — PANTOPRAZOLE SODIUM 20 MG/1
40 TABLET, DELAYED RELEASE ORAL EVERY 12 HOURS
Refills: 0 | Status: DISCONTINUED | OUTPATIENT
Start: 2022-09-24 | End: 2022-09-24

## 2022-09-24 RX ORDER — SODIUM CHLORIDE 9 MG/ML
1000 INJECTION, SOLUTION INTRAVENOUS
Refills: 0 | Status: DISCONTINUED | OUTPATIENT
Start: 2022-09-24 | End: 2022-09-24

## 2022-09-24 RX ORDER — PANTOPRAZOLE SODIUM 20 MG/1
8 TABLET, DELAYED RELEASE ORAL
Qty: 80 | Refills: 0 | Status: DISCONTINUED | OUTPATIENT
Start: 2022-09-24 | End: 2022-09-24

## 2022-09-24 RX ORDER — PANTOPRAZOLE SODIUM 20 MG/1
80 TABLET, DELAYED RELEASE ORAL ONCE
Refills: 0 | Status: COMPLETED | OUTPATIENT
Start: 2022-09-24 | End: 2022-09-24

## 2022-09-24 RX ORDER — DEXTROSE 50 % IN WATER 50 %
12.5 SYRINGE (ML) INTRAVENOUS ONCE
Refills: 0 | Status: DISCONTINUED | OUTPATIENT
Start: 2022-09-24 | End: 2022-09-30

## 2022-09-24 RX ORDER — SOD SULF/SODIUM/NAHCO3/KCL/PEG
4000 SOLUTION, RECONSTITUTED, ORAL ORAL ONCE
Refills: 0 | Status: COMPLETED | OUTPATIENT
Start: 2022-09-24 | End: 2022-09-25

## 2022-09-24 RX ORDER — DEXTROSE MONOHYDRATE, SODIUM CHLORIDE, AND POTASSIUM CHLORIDE 50; .745; 4.5 G/1000ML; G/1000ML; G/1000ML
1000 INJECTION, SOLUTION INTRAVENOUS
Refills: 0 | Status: DISCONTINUED | OUTPATIENT
Start: 2022-09-24 | End: 2022-09-24

## 2022-09-24 RX ORDER — CEFTRIAXONE 500 MG/1
1000 INJECTION, POWDER, FOR SOLUTION INTRAMUSCULAR; INTRAVENOUS ONCE
Refills: 0 | Status: COMPLETED | OUTPATIENT
Start: 2022-09-24 | End: 2022-09-24

## 2022-09-24 RX ORDER — DEXTROSE 50 % IN WATER 50 %
25 SYRINGE (ML) INTRAVENOUS ONCE
Refills: 0 | Status: DISCONTINUED | OUTPATIENT
Start: 2022-09-24 | End: 2022-09-30

## 2022-09-24 RX ORDER — LEVOTHYROXINE SODIUM 125 MCG
100 TABLET ORAL DAILY
Refills: 0 | Status: DISCONTINUED | OUTPATIENT
Start: 2022-09-24 | End: 2022-09-24

## 2022-09-24 RX ORDER — INSULIN LISPRO 100/ML
VIAL (ML) SUBCUTANEOUS
Refills: 0 | Status: DISCONTINUED | OUTPATIENT
Start: 2022-09-24 | End: 2022-09-27

## 2022-09-24 RX ORDER — IPRATROPIUM/ALBUTEROL SULFATE 18-103MCG
3 AEROSOL WITH ADAPTER (GRAM) INHALATION EVERY 6 HOURS
Refills: 0 | Status: DISCONTINUED | OUTPATIENT
Start: 2022-09-24 | End: 2022-09-30

## 2022-09-24 RX ORDER — DEXTROSE 50 % IN WATER 50 %
15 SYRINGE (ML) INTRAVENOUS ONCE
Refills: 0 | Status: DISCONTINUED | OUTPATIENT
Start: 2022-09-24 | End: 2022-09-30

## 2022-09-24 RX ORDER — CEFTRIAXONE 500 MG/1
1000 INJECTION, POWDER, FOR SOLUTION INTRAMUSCULAR; INTRAVENOUS EVERY 24 HOURS
Refills: 0 | Status: DISCONTINUED | OUTPATIENT
Start: 2022-09-25 | End: 2022-09-26

## 2022-09-24 RX ORDER — LEVOTHYROXINE SODIUM 125 MCG
80 TABLET ORAL AT BEDTIME
Refills: 0 | Status: DISCONTINUED | OUTPATIENT
Start: 2022-09-25 | End: 2022-09-27

## 2022-09-24 RX ADMIN — DEXTROSE MONOHYDRATE, SODIUM CHLORIDE, AND POTASSIUM CHLORIDE 80 MILLILITER(S): 50; .745; 4.5 INJECTION, SOLUTION INTRAVENOUS at 18:27

## 2022-09-24 RX ADMIN — PANTOPRAZOLE SODIUM 10 MG/HR: 20 TABLET, DELAYED RELEASE ORAL at 14:45

## 2022-09-24 RX ADMIN — PANTOPRAZOLE SODIUM 80 MILLIGRAM(S): 20 TABLET, DELAYED RELEASE ORAL at 13:21

## 2022-09-24 RX ADMIN — CEFTRIAXONE 100 MILLIGRAM(S): 500 INJECTION, POWDER, FOR SOLUTION INTRAMUSCULAR; INTRAVENOUS at 14:36

## 2022-09-24 NOTE — ED PROVIDER NOTE - NS ED ATTENDING STATEMENT MOD
This was a shared visit with the PATRICK. I reviewed and verified the documentation and independently performed the documented:

## 2022-09-24 NOTE — H&P ADULT - ASSESSMENT
89 y/o F with PMHx HTN, HLD, DM, CAD s/p cath in May 2022, moderate AS, HFpEF (EF 65% May 2022), hx GI bleed (June 2021 @ 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 Presented to [facility] today for bloody bowel movements since this morning. Patient accompanied with grandson who contributed to history. Patient reported that this morning she woke up and went to the bathroom and noted black and bright red mixed stools, this is the first time that this happens since prior admission on 06/2021. She indicated that she had 2 additional BM described as the one mentioned before. She mentioned feeling lightheaded after the episodes, but didn't fall or LOC. She denied any other symptoms like abdominal pain, nausea, vomit, diarrhea, constipation. After being discharged from prior GI bleed admission, patient followed up with GI doctor, but she didn't have any other workup because "she was too old to have a colonoscopy". Most recent admission on May 2022 for cardiac catheterization. In the ED: Accompanied by alba, resting comfortably in bed, patient HD stable, labs significant for Hb 8.8 from 11 (05/2022), and + UTI.     Plan:    -Admit to telemetry under Dr. Ruiz  -NPO  -IVF  -Ceftriaxone for UTI  -Sliding scale  -PPI  -q6 CBC  -Maintain active t/s  -Maintain 2 large bore IVs  -GI consult  -Cardiolog consult  -Hold asa/plavix

## 2022-09-24 NOTE — ED PROVIDER NOTE - PHYSICAL EXAMINATION
Vitals reviewed  Gen: well appearing elderly F, nad, speaking in full sentences  Skin: wwp, no rash/lesions  HEENT: ncat, eomi, mmm  CV: +s1/2, no audible m/r/g  Resp: symmetrical expansion, ctab, no w/r/r  Abd: nondistended, soft, mild suprapubic ttp, no rebound/guarding, no cvat  CHUY: + maroon blood noted on digit, no active bleeding, no mass or ttp   Ext: FROM throughout, no peripheral edema  Neuro: alert/oriented x3, no focal deficits, steady gait

## 2022-09-24 NOTE — CONSULT NOTE ADULT - ATTENDING COMMENTS
89yo W with PMH of HTN, HLD, DM, CAD s/p cath in May 2022, moderate AS, HFpEF (EF 65% May 2022), hx GI bleed (June 2021 @ [facility], found to have stomach polyp likely GIST, and multiple diverticulosis without bleeding) hx multiple vasovagal episodes, hypothyroidism, Asthma/COPD (last hospitalization years ago, denies intubations), PMR (on MTX and Prednisone). Presented to [facility] today for bloody bowel movements since this morning x 4.    Given recent colonoscopy with diverticular disease, suspect diverticular bleeding. Will observe course, consider colonoscopy if bleeding persists.     Agree with plan as outlined above.    ANGELY Rain MD  GI attending

## 2022-09-24 NOTE — CONSULT NOTE ADULT - SUBJECTIVE AND OBJECTIVE BOX
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 Presented to [facility] today for bloody bowel movements since this morning. Patient accompanied with grandson who contributed to history. Patient reported that this morning she woke up and went to the bathroom and noted black and bright red mixed stools, this is the first time that this happens since prior admission on 2021. She indicated that she had 2 additional BM described as the one mentioned before. She mentioned feeling lightheaded after the episodes, but didn't fall or LOC. She denied any other symptoms like abdominal pain, nausea, vomit, diarrhea, constipation. After being discharged from prior GI bleed admission, patient followed up with GI doctor, but she didn't have any other workup because "she was too old to have a colonoscopy". Most recent admission on May 2022 for cardiac catheterization.    In the ED: Accompanied by alba, resting comfortably in bed, patient HD stable, labs significant for Hb 8.8 from 11 (2022), and + UTI.     PMH: As above  PSHx: Hysterectomy, oophorectomy, cardiac catheterization May 2022  Meds: Losartan, aspirin, gabapentin, raloxifene, folic acid, allopurinol, furosemide, oxybutynin, ezetimibe, tizanidine, plavix  Allergies: ACEi, sulfa      PAST MEDICAL HISTORY:  HTN (hypertension)    COPD (chronic obstructive pulmonary disease)    Arthritis    Polymyalgia rheumatica    Hyperlipidemia    Chronic diastolic heart failure    Coronary artery disease    Diabetes    Moderate aortic stenosis        PAST SURGICAL HISTORY:  H/O bilateral oophorectomy    H/O total hysterectomy      ALLERGIES:  Fosamax (Angioedema)  lisinopril (Angioedema)  statins (Unknown)  sulfa drugs (Hives)  tetanus immune globulin (Hives)      Vitals:  Vital Signs Last 24 Hrs  T(C): 36.8 (24 Sep 2022 11:16), Max: 36.8 (24 Sep 2022 11:16)  T(F): 98.3 (24 Sep 2022 11:16), Max: 98.3 (24 Sep 2022 11:16)  HR: 91 (24 Sep 2022 11:16) (91 - 91)  BP: 146/84 (24 Sep 2022 11:16) (146/84 - 146/84)  BP(mean): --  RR: 16 (24 Sep 2022 11:16) (16 - 16)  SpO2: 97% (24 Sep 2022 11:16) (97% - 97%)    Parameters below as of 24 Sep 2022 11:16  Patient On (Oxygen Delivery Method): room air          PHYSICAL EXAM:    General: NAD, resting comfortably in bed  Pulm: Nonlabored breathing, no respiratory distress, saturating 94% on RA  Abd: Soft, NTND  Rectal: Dark blood noted around anus, on rectal exam dark blood noted, no active bleeding, no hemorrhoids palpated, no stool noted on glove  Extrem: WWP  Neuro: A/O x 3  Pulses: Palpable distal pulses     I&o's:  I&O's Summary      LABS:                        8.8    5.05  )-----------( 190      ( 24 Sep 2022 11:56 )             27.4     09-24    142  |  108  |  13  ----------------------------<  83  4.3   |  26  |  0.74    Ca    8.2<L>      24 Sep 2022 11:56    TPro  6.6  /  Alb  3.8  /  TBili  0.7  /  DBili  x   /  AST  17  /  ALT  9<L>  /  AlkPhos  59      Lactate:    PT/INR - ( 24 Sep 2022 11:56 )   PT: 13.3 sec;   INR: 1.12          PTT - ( 24 Sep 2022 11:56 )  PTT:26.1 sec          Urinalysis Basic - ( 24 Sep 2022 11:56 )    Color: Yellow / Appearance: Clear / S.015 / pH: x  Gluc: x / Ketone: NEGATIVE  / Bili: Negative / Urobili: 0.2 E.U./dL   Blood: x / Protein: NEGATIVE mg/dL / Nitrite: NEGATIVE   Leuk Esterase: Trace / RBC: < 5 /HPF / WBC > 10 /HPF   Sq Epi: x / Non Sq Epi: 0-5 /HPF / Bacteria: Present /HPF        MICRO:     IMAGING:       (24 Sep 2022 13:54)      SICU ADDENDUM:    PMH:     MEDS:     Allergies    Fosamax (Angioedema)  lisinopril (Angioedema)  sulfa drugs (Hives)  tetanus immune globulin (Hives)    Intolerances    statins (Unknown)      ICU Vital Signs Last 24 Hrs  T(F): 98.3 (22 @ 11:16), Max: 98.3 (22 @ 11:16)  HR: 78 (22 @ 20:39) (78 - 91)  BP: 125/67 (22 @ 20:39) (125/67 - 150/67)  BP(mean): 91 (22 @ 20:39) (91 - 96)  ABP: --  RR: 17 (22 @ 20:39) (16 - 17)  SpO2: 100% (22 @ 20:39) (97% - 100%)    General: NAD, resting comfortably in bed. Well developed, well groomed  NEURO: AAOx3, CN II-XII grossly intact, EOMI, follows commands  HEENT: PERRL, MMM  CV: RRR, no MRG  PULM: CTAB, nonlabored breathing, no respiratory distress  ABD: soft, NT/ND. No rebound, no guarding, no tympany. Incisions CDI.   : Baird in place, normal genitalia  RECTAL: intact sphincter tone, no evidence of hemorrhoids, no blood, stool in vault  EXTREM: WWP, no edema, no calf tenderness  VASC: No cyanosis, pallor. Palpable radial and PT bilaterally  SKIN: No rashes noted  PSYCH: Appropriate affect, answers questions appropriately  LABS:        143  |  112<H>  |  15  ----------------------------<  101<H>  4.2   |  21<L>  |  0.70    Ca    7.5<L>      24 Sep 2022 20:55    TPro  6.6  /  Alb  3.8  /  TBili  0.7  /  DBili  x   /  AST  17  /  ALT  9<L>  /  AlkPhos  59    LIVER FUNCTIONS - ( 24 Sep 2022 11:56 )  Alb: 3.8 g/dL / Pro: 6.6 g/dL / ALK PHOS: 59 U/L / ALT: 9 U/L / AST: 17 U/L / GGT: x                               6.5    5.50  )-----------( 164      ( 24 Sep 2022 20:55 )             20.0   PT/INR - ( 24 Sep 2022 11:56 )   PT: 13.3 sec;   INR: 1.12          PTT - ( 24 Sep 2022 11:56 )  PTT:26.1 sec  Urinalysis Basic - ( 24 Sep 2022 11:56 )    Color: Yellow / Appearance: Clear / S.015 / pH: x  Gluc: x / Ketone: NEGATIVE  / Bili: Negative / Urobili: 0.2 E.U./dL   Blood: x / Protein: NEGATIVE mg/dL / Nitrite: NEGATIVE   Leuk Esterase: Trace / RBC: < 5 /HPF / WBC > 10 /HPF   Sq Epi: x / Non Sq Epi: 0-5 /HPF / Bacteria: Present /HPF    CAPILLARY BLOOD GLUCOSE      POCT Blood Glucose.: 108 mg/dL (24 Sep 2022 21:22)  POCT Blood Glucose.: 89 mg/dL (24 Sep 2022 18:02)    Baird:	  [ ] None	[ ] Daily Baird Order Placed	   Indication:	  [ ] Strict I and O's    [ ] Obstruction     [ ] Incontinence + Stage 3 or 4 Decubitus  Central Line:  [ ] None	   [ ]  Medication / TPN Administration     [ ] No Peripheral IV     A/P:    NEURO:  CV:  PULM:   GI/FEN:  :  ENDO: ISS  ID:  PPX: SCDs   LINES: PIVs,   WOUNDS/DRAINS:            HPI: 89 y/o F with PMHx HTN, HLD, DM, CAD s/p cath in May 2022, moderate AS, HFpEF (EF 65% May 2022), hx GI bleed (2021 @ [facility], 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 Presented to [facility] today for bloody bowel movements since this morning. Patient accompanied with grandson who contributed to history. Patient reported that this morning she woke up and went to the bathroom and noted black and bright red mixed stools, this is the first time that this happens since prior admission on 2021. She indicated that she had 2 additional BM described as the one mentioned before. She mentioned feeling lightheaded after the episodes, but didn't fall or LOC. She denied any other symptoms like abdominal pain, nausea, vomit, diarrhea, constipation. After being discharged from prior GI bleed admission, patient followed up with GI doctor, but she didn't have any other workup because "she was too old to have a colonoscopy". Most recent admission on May 2022 for cardiac catheterization.    In the ED: Accompanied by alba, resting comfortably in bed, patient HD stable, labs significant for Hb 8.8 from 11 (2022), and + UTI.       SICU ADDENDUM: HPI as above. Pt initially placed on telemetry unit with plan for q6 CBCs, however with multiple episodes of blood per rectum (6 since presentation in ED) and repeat hb at 9PM noted to be 6.5 from 8.8. Transferred to the SICU in setting of LGIB and advanced age with multiple comorbidities. Of note, is full code status.     PMH: As above  PSHx: Hysterectomy, oophorectomy, cardiac catheterization May 2022  Meds: Losartan, aspirin, gabapentin, raloxifene, folic acid, allopurinol, furosemide, oxybutynin, ezetimibe, tizanidine, plavix  Allergies: ACEi, sulfa      PAST MEDICAL HISTORY:  HTN (hypertension)  COPD (chronic obstructive pulmonary disease)  Arthritis  Polymyalgia rheumatica  Hyperlipidemia  Chronic diastolic heart failure  Coronary artery disease  Diabetes  Moderate aortic stenosis    PAST SURGICAL HISTORY:  H/O bilateral oophorectomy  H/O total hysterectomy    ALLERGIES:  Fosamax (Angioedema)  lisinopril (Angioedema)  statins (Unknown)  sulfa drugs (Hives)  tetanus immune globulin (Hives)      I&o's:  I&O's Summary      LABS:                        8.8    5.05  )-----------( 190      ( 24 Sep 2022 11:56 )             27.4         142  |  108  |  13  ----------------------------<  83  4.3   |  26  |  0.74    Ca    8.2<L>      24 Sep 2022 11:56    TPro  6.6  /  Alb  3.8  /  TBili  0.7  /  DBili  x   /  AST  17  /  ALT  9<L>  /  AlkPhos  59      Lactate:    PT/INR - ( 24 Sep 2022 11:56 )   PT: 13.3 sec;   INR: 1.12          PTT - ( 24 Sep 2022 11:56 )  PTT:26.1 sec          Urinalysis Basic - ( 24 Sep 2022 11:56 )    Color: Yellow / Appearance: Clear / S.015 / pH: x  Gluc: x / Ketone: NEGATIVE  / Bili: Negative / Urobili: 0.2 E.U./dL   Blood: x / Protein: NEGATIVE mg/dL / Nitrite: NEGATIVE   Leuk Esterase: Trace / RBC: < 5 /HPF / WBC > 10 /HPF   Sq Epi: x / Non Sq Epi: 0-5 /HPF / Bacteria: Present /HPF        ICU Vital Signs Last 24 Hrs  T(F): 98.3 (22 @ 11:16), Max: 98.3 (22 @ 11:16)  HR: 78 (22 @ 20:39) (78 - 91)  BP: 125/67 (22 @ 20:39) (125/67 - 150/67)  BP(mean): 91 (22 @ 20:39) (91 - 96)  ABP: --  RR: 17 (22 @ 20:39) (16 - 17)  SpO2: 100% (22 @ 20:39) (97% - 100%)    General: NAD, resting comfortably in bed. Well developed, well groomed  NEURO: AAOx3, CN II-XII grossly intact, EOMI, follows commands  HEENT: PERRL, MMM  CV: RRR, no MRG  PULM: CTAB, nonlabored breathing, no respiratory distress  ABD: soft, NT/ND. No rebound, no guarding, no tympany.   : normal genitalia  RECTAL: Dark blood noted around anus, on rectal exam dark blood noted, no active bleeding, no hemorrhoids palpated, no stool noted on glove  EXTREM: WWP, no edema, no calf tenderness  VASC: No cyanosis, pallor. Palpable radial and PT bilaterally  SKIN: No rashes noted  PSYCH: Appropriate affect, answers questions appropriately    LABS:        143  |  112<H>  |  15  ----------------------------<  101<H>  4.2   |  21<L>  |  0.70    Ca    7.5<L>      24 Sep 2022 20:55    TPro  6.6  /  Alb  3.8  /  TBili  0.7  /  DBili  x   /  AST  17  /  ALT  9<L>  /  AlkPhos  59    LIVER FUNCTIONS - ( 24 Sep 2022 11:56 )  Alb: 3.8 g/dL / Pro: 6.6 g/dL / ALK PHOS: 59 U/L / ALT: 9 U/L / AST: 17 U/L / GGT: x                               6.5    5.50  )-----------( 164      ( 24 Sep 2022 20:55 )             20.0   PT/INR - ( 24 Sep 2022 11:56 )   PT: 13.3 sec;   INR: 1.12          PTT - ( 24 Sep 2022 11:56 )  PTT:26.1 sec  Urinalysis Basic - ( 24 Sep 2022 11:56 )    Color: Yellow / Appearance: Clear / S.015 / pH: x  Gluc: x / Ketone: NEGATIVE  / Bili: Negative / Urobili: 0.2 E.U./dL   Blood: x / Protein: NEGATIVE mg/dL / Nitrite: NEGATIVE   Leuk Esterase: Trace / RBC: < 5 /HPF / WBC > 10 /HPF   Sq Epi: x / Non Sq Epi: 0-5 /HPF / Bacteria: Present /HPF    CAPILLARY BLOOD GLUCOSE      POCT Blood Glucose.: 108 mg/dL (24 Sep 2022 21:22)  POCT Blood Glucose.: 89 mg/dL (24 Sep 2022 18:02)

## 2022-09-24 NOTE — H&P ADULT - NSHPLABSRESULTS_GEN_ALL_CORE
.  LABS:                         8.8    5.05  )-----------( 190      ( 24 Sep 2022 11:56 )             27.4         142  |  108  |  13  ----------------------------<  83  4.3   |  26  |  0.74    Ca    8.2<L>      24 Sep 2022 11:56    TPro  6.6  /  Alb  3.8  /  TBili  0.7  /  DBili  x   /  AST  17  /  ALT  9<L>  /  AlkPhos  59  24    PT/INR - ( 24 Sep 2022 11:56 )   PT: 13.3 sec;   INR: 1.12          PTT - ( 24 Sep 2022 11:56 )  PTT:26.1 sec  Urinalysis Basic - ( 24 Sep 2022 11:56 )    Color: Yellow / Appearance: Clear / S.015 / pH: x  Gluc: x / Ketone: NEGATIVE  / Bili: Negative / Urobili: 0.2 E.U./dL   Blood: x / Protein: NEGATIVE mg/dL / Nitrite: NEGATIVE   Leuk Esterase: Trace / RBC: < 5 /HPF / WBC > 10 /HPF   Sq Epi: x / Non Sq Epi: 0-5 /HPF / Bacteria: Present /HPF            RADIOLOGY, EKG & ADDITIONAL TESTS: Reviewed.

## 2022-09-24 NOTE — ED ADULT TRIAGE NOTE - CHIEF COMPLAINT QUOTE
"I went to urinate at 4am and I pooped and it was dark and bloody". Patient take plavix and aspirin 81mg.

## 2022-09-24 NOTE — ED PROVIDER NOTE - IV ALTEPLASE EXCL REL HIDDEN
Spoke with grand daughter, she got a missed call, no message   Informed her I did not see any record who called her , informed her if she can the call again and message left , to call our office, she voiced understanding show

## 2022-09-24 NOTE — ED PROVIDER NOTE - OBJECTIVE STATEMENT
89 F pmh HTN, HLD, CAD (known severe mRCA dz- on ASA/plavix), moderate AS, ?HFpEF (EF 60%), hx GI bleed (June 2021 @ [facility], found to have GIST and diverticulosis), hx multiple vasovagal episodes, hypothyroidism, Asthma/COPD (last hospitalization years ago, denies intubations), Polymyalgia Rheumatica (on MTX and Prednisone), recent cards admit 5/2022 89 F pmh HTN, HLD, CAD (known severe mRCA dz- on ASA/plavix), moderate AS, ?HFpEF (EF 60%), hx GI bleed (June 2021 @ 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  pt reports she went to use bathroom to urinate and noted large loose blackish BM, after reports 2 more episodes.  pt reports taking ASA daily, thinks she started plavix last week (as per EMR taking since May).  denies NSAID or etoh use.  + sob for many months 2/2 COPD as per pt, no change, denies chest pain/palpitations.  denies f/c, headache, dizziness, fainting, abd pain, nvd, urinary sxs, trauma

## 2022-09-24 NOTE — CONSULT NOTE ADULT - NS ATTEST RISK PROBLEM GEN_ALL_CORE FT
acute lower GI bleeding requiring transfusion of PRBC and volume resuscitation acute blood loss anemia due to lower GI bleeding requiring transfusion of PRBC and volume resuscitation

## 2022-09-24 NOTE — ED PROVIDER NOTE - NSICDXPASTMEDICALHX_GEN_ALL_CORE_FT
PAST MEDICAL HISTORY:  Arthritis     Chronic diastolic heart failure     COPD (chronic obstructive pulmonary disease)     Coronary artery disease     Diabetes     HTN (hypertension)     Hyperlipidemia     Polymyalgia rheumatica

## 2022-09-24 NOTE — CONSULT NOTE ADULT - ASSESSMENT
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 Presented to [facility] today for bloody bowel movements since this morning x 4. GI was consulted for GIB.    #GI Bleed  Patient presenting with 1 day maroon rectal bleeding with hgb drop ~1u from baseline. She has had prior admission for hematochezia in Jun 2021, likely diverticular in nature. Most likely she is again presenting with a diverticular bleed, for which we will continue to monitor and treat conservatively for now given age, cardiac comorbidities, and recent endoscopic evaluation.    Prior evaluation  -EGD (6/9/21): non-erosive gastritis, single polyp in fundus, possible GIST  -Colonoscopy (6/9/21): severe pan-diverticulosis     Recommendations:   - Maintain active T&S, large bore IV access  - Monitor hgb and hemodynamics  - Supportive care per primary team  - PPI IV BID  - Cardiology consult to hold ASA/ plavix    case d/w svc attending and primary team    Juliette Velasco MD  Gastroenterology Fellow, PGY -5

## 2022-09-24 NOTE — H&P ADULT - HISTORY OF PRESENT ILLNESS
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 Presented to [facility] today for bloody bowel movements since this morning. Patient accompanied with grandson who contributed to history. Patient reported that this morning she woke up and went to the bathroom and noted black and bright red mixed stools, this is the first time that this happens since prior admission on 2021. She indicated that she had 2 additional BM described as the one mentioned before. She mentioned feeling lightheaded after the episodes, but didn't fall or LOC. She denied any other symptoms like abdominal pain, nausea, vomit, diarrhea, constipation. After being discharged from prior GI bleed admission, patient followed up with GI doctor, but she didn't have any other workup because "she was too old to have a colonoscopy". Most recent admission on May 2022 for cardiac catheterization.    In the ED: Accompanied by grandtrevon, resting comfortably in bed, patient HD stable, labs significant for Hb 8.8 from 11 (2022), and + UTI.     PMH: As above  PSHx: Hysterectomy, oophorectomy, cardiac catheterization May 2022  Meds: Losartan, aspirin, gabapentin, raloxifene, folic acid, allopurinol, furosemide, oxybutynin, ezetimibe, tizanidine, plavix  Allergies: ACEi, sulfa      PAST MEDICAL HISTORY:  HTN (hypertension)    COPD (chronic obstructive pulmonary disease)    Arthritis    Polymyalgia rheumatica    Hyperlipidemia    Chronic diastolic heart failure    Coronary artery disease    Diabetes    Moderate aortic stenosis        PAST SURGICAL HISTORY:  H/O bilateral oophorectomy    H/O total hysterectomy      ALLERGIES:  Fosamax (Angioedema)  lisinopril (Angioedema)  statins (Unknown)  sulfa drugs (Hives)  tetanus immune globulin (Hives)      Vitals:  Vital Signs Last 24 Hrs  T(C): 36.8 (24 Sep 2022 11:16), Max: 36.8 (24 Sep 2022 11:16)  T(F): 98.3 (24 Sep 2022 11:16), Max: 98.3 (24 Sep 2022 11:16)  HR: 91 (24 Sep 2022 11:16) (91 - 91)  BP: 146/84 (24 Sep 2022 11:16) (146/84 - 146/84)  BP(mean): --  RR: 16 (24 Sep 2022 11:16) (16 - 16)  SpO2: 97% (24 Sep 2022 11:16) (97% - 97%)    Parameters below as of 24 Sep 2022 11:16  Patient On (Oxygen Delivery Method): room air          PHYSICAL EXAM:    General: NAD, resting comfortably in bed  Pulm: Nonlabored breathing, no respiratory distress, saturating 94% on RA  Abd: Soft, NTND  Rectal: Dark blood noted around anus, on rectal exam dark blood noted, no active bleeding, no hemorrhoids palpated, no stool noted on glove  Extrem: WWP  Neuro: A/O x 3  Pulses: Palpable distal pulses     I&o's:  I&O's Summary      LABS:                        8.8    5.05  )-----------( 190      ( 24 Sep 2022 11:56 )             27.4     09-24    142  |  108  |  13  ----------------------------<  83  4.3   |  26  |  0.74    Ca    8.2<L>      24 Sep 2022 11:56    TPro  6.6  /  Alb  3.8  /  TBili  0.7  /  DBili  x   /  AST  17  /  ALT  9<L>  /  AlkPhos  59      Lactate:    PT/INR - ( 24 Sep 2022 11:56 )   PT: 13.3 sec;   INR: 1.12          PTT - ( 24 Sep 2022 11:56 )  PTT:26.1 sec          Urinalysis Basic - ( 24 Sep 2022 11:56 )    Color: Yellow / Appearance: Clear / S.015 / pH: x  Gluc: x / Ketone: NEGATIVE  / Bili: Negative / Urobili: 0.2 E.U./dL   Blood: x / Protein: NEGATIVE mg/dL / Nitrite: NEGATIVE   Leuk Esterase: Trace / RBC: < 5 /HPF / WBC > 10 /HPF   Sq Epi: x / Non Sq Epi: 0-5 /HPF / Bacteria: Present /HPF        MICRO:     IMAGING:

## 2022-09-24 NOTE — H&P ADULT - NSHPPHYSICALEXAM_GEN_ALL_CORE
Physical Exam:    General: NAD, resting comfortably in bed  Pulm: Nonlabored breathing, no respiratory distress, saturating 94% on RA  Abd: Soft, NTND  Rectal: Dark blood noted around anus, on rectal exam dark blood noted, no active bleeding, no hemorrhoids palpated, no stool noted on glove  Extrem: WWP  Neuro: A/O x 3  Pulses: Palpable distal pulses

## 2022-09-24 NOTE — ED PROVIDER NOTE - CLINICAL SUMMARY MEDICAL DECISION MAKING FREE TEXT BOX
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  denies f/c, abd pain, dizziness/fainting.  on exam VSS, comfortable appearing, Abd: nondistended, soft, mild suprapubic ttp, no rebound/guarding, no cvat;  CHUY: + maroon blood noted on digit, no active bleeding, no mass or ttp.  will obtain labs, give protonix bolus/drip and c/s surg/GI for admit

## 2022-09-24 NOTE — ED PROVIDER NOTE - PROGRESS NOTE DETAILS
labs w/ stabl h/h.  ua + infection- abx given.  GI aware of pt, will eval.  surg eval and for tele surg admit

## 2022-09-24 NOTE — ED ADULT NURSE NOTE - OBJECTIVE STATEMENT
89y F, presents to the ED c/o 3 episodes of dark bloody stool since today. Pt reports she takes aspirin and plavix. Denies chest pain abdominal pain, NVD, dizziness, lightheadedness, fever, chills, urinary symptoms. Pt A&Ox4, ambulatory with assist, speaking in clear/full sentences, vital signs stable.

## 2022-09-24 NOTE — CONSULT NOTE ADULT - ASSESSMENT
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 Presented to [facility] on 9/24 for bloody bowel movements since the morning. In the ED, pt afebrile, HDS, nontoxic appearing, initially admitted to telemetry with GI/cardiology consults and q6 CBCs. S/p multiple episodes of blood per rectum and repeat hb of 6.5 from 8.8. Transferred to the SICU in the setting of presumed LGIB.     Plan:  Neuro: Pain/nausea control PRN  CV: MAP >65, hx of CHETAN in May 2022, holding ASA/Plavix (cardiology consulted for duration), HFpEF (65%), holding home lasix/amlodipine  Pulm: Satting well on RA, hx of COPD  GI: NPO, 2U PRBC ordered, GI consulted for possible scope, started on golytely prep, q6hr CBCs  : Voids  Heme: SCDs, holding SQH in setting of active bleed  ID: UTI - ceftriaxone 1mg q24 (9/24--)  Endo: hypothyroidism - synthyroid 80mcg IV, mISS  Lines: 2 20G PIVs, A-line (9/24--_  PT: not ordered  Dispo: SICU

## 2022-09-24 NOTE — H&P ADULT - NSHPREVIEWOFSYSTEMS_GEN_ALL_CORE
CONSTITUTIONAL:  No weight loss, fever, chills, weakness or fatigue.  HEENT:  Eyes:  No visual loss, blurred vision, double vision or yellow sclerae. Ears, Nose, Throat:  No hearing loss, sneezing, congestion, runny nose or sore throat.  SKIN:  No rash or itching.  CARDIOVASCULAR:  No chest pain, chest pressure or chest discomfort. No palpitations or edema.  RESPIRATORY:  No shortness of breath, cough or sputum.  GASTROINTESTINAL:  No anorexia, nausea, vomiting or diarrhea. BLOODY BOWEL MOVEMENT.  GENITOURINARY:  No burning on urination.   NEUROLOGICAL:  No headache, dizziness, syncope, paralysis, ataxia, numbness or tingling in the extremities. No change in bowel or bladder control.  MUSCULOSKELETAL:  No muscle, back pain, joint pain or stiffness.  HEMATOLOGIC:  No anemia, bleeding or bruising.  LYMPHATICS:  No enlarged nodes. No history of splenectomy.  PSYCHIATRIC:  No history of depression or anxiety.  ENDOCRINOLOGIC:  No reports of sweating, cold or heat intolerance. No polyuria or polydipsia.  ALLERGIES:  No history of asthma, hives, eczema or rhinitis.

## 2022-09-24 NOTE — ED PROVIDER NOTE - ATTENDING APP SHARED VISIT CONTRIBUTION OF CARE
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 No abd pain, n/v, hematemesis, cp, sob, dizziness, weakness, syncope. AVSS. PE as above. + mild suprapubic tenderness. + maroon colored stools on rectal exam, no active bleeding. Hg/hct 8.8/27.4. Pt ordered for protonix bolus/ drip. GI and surgery consults obtained. [place] [facility] [place] and management of gib.

## 2022-09-24 NOTE — CONSULT NOTE ADULT - SUBJECTIVE AND OBJECTIVE BOX
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 Presented to [facility] today for bloody bowel movements since this morning. Patient accompanied with grandson who contributed to history. Patient reported that this morning she woke up and went to the bathroom and noted black and bright red mixed stools, this is the first time that this happens since prior admission on 2021. She indicated that she had 2 additional BM described as the one mentioned before. She mentioned feeling lightheaded after the episodes, but didn't fall or LOC. She denied any other symptoms like abdominal pain, nausea, vomit, diarrhea, constipation. After being discharged from prior GI bleed admission, patient followed up with GI doctor, but she didn't have any other workup because "she was too old to have a colonoscopy". Most recent admission on May 2022 for cardiac catheterization.    PMH: As above  PSHx: Hysterectomy, oophorectomy, cardiac catheterization May 2022  Meds: Losartan, aspirin, gabapentin, raloxifene, folic acid, allopurinol, furosemide, oxybutynin, ezetimibe, tizanidine, plavix  Allergies: ACEi, sulfa  NSAIDS: denies  Tob: denies  ETOH: denies      PAST MEDICAL HISTORY:  HTN (hypertension)    COPD (chronic obstructive pulmonary disease)    Arthritis    Polymyalgia rheumatica    Hyperlipidemia    Chronic diastolic heart failure    Coronary artery disease    Diabetes    Moderate aortic stenosis        PAST SURGICAL HISTORY:  H/O bilateral oophorectomy    H/O total hysterectomy      ALLERGIES:  Fosamax (Angioedema)  lisinopril (Angioedema)  statins (Unknown)  sulfa drugs (Hives)  tetanus immune globulin (Hives)      Vitals:  Vital Signs Last 24 Hrs  T(C): 36.8 (24 Sep 2022 11:16), Max: 36.8 (24 Sep 2022 11:16)  T(F): 98.3 (24 Sep 2022 11:16), Max: 98.3 (24 Sep 2022 11:16)  HR: 91 (24 Sep 2022 11:16) (91 - 91)  BP: 146/84 (24 Sep 2022 11:16) (146/84 - 146/84)  BP(mean): --  RR: 16 (24 Sep 2022 11:16) (16 - 16)  SpO2: 97% (24 Sep 2022 11:16) (97% - 97%)    Parameters below as of 24 Sep 2022 11:16  Patient On (Oxygen Delivery Method): room air          PHYSICAL EXAM:    General: NAD, resting comfortably in bed  Pulm: Nonlabored breathing, no respiratory distress, saturating 94% on RA  Abd: Soft, NTND  Rectal: Dark blood noted around anus, on rectal exam dark blood noted, no active bleeding, no hemorrhoids palpated, no stool noted on glove  Extrem: WWP  Neuro: A/O x 3  Pulses: Palpable distal pulses     I&o's:  I&O's Summary      LABS:                        8.8    5.05  )-----------( 190      ( 24 Sep 2022 11:56 )             27.4     09-24    142  |  108  |  13  ----------------------------<  83  4.3   |  26  |  0.74    Ca    8.2<L>      24 Sep 2022 11:56    TPro  6.6  /  Alb  3.8  /  TBili  0.7  /  DBili  x   /  AST  17  /  ALT  9<L>  /  AlkPhos  59  -24    Lactate:    PT/INR - ( 24 Sep 2022 11:56 )   PT: 13.3 sec;   INR: 1.12          PTT - ( 24 Sep 2022 11:56 )  PTT:26.1 sec          Urinalysis Basic - ( 24 Sep 2022 11:56 )    Color: Yellow / Appearance: Clear / S.015 / pH: x  Gluc: x / Ketone: NEGATIVE  / Bili: Negative / Urobili: 0.2 E.U./dL   Blood: x / Protein: NEGATIVE mg/dL / Nitrite: NEGATIVE   Leuk Esterase: Trace / RBC: < 5 /HPF / WBC > 10 /HPF   Sq Epi: x / Non Sq Epi: 0-5 /HPF / Bacteria: Present /HPF        MICRO:     IMAGING:       (24 Sep 2022 13:54)    Allergies    Fosamax (Angioedema)  lisinopril (Angioedema)  sulfa drugs (Hives)  tetanus immune globulin (Hives)    Intolerances    statins (Unknown)    Home Medications:  allopurinol 100 mg oral tablet: 1 tab(s) orally once a day (2022 16:14)  amLODIPine 5 mg oral tablet: 1 tab(s) orally once a day (in the evening) (2022 16:14)  Augmentin 500 mg-125 mg oral tablet: 1 tab(s) orally 2 times a day x 15 days  (2022 16:14)  folic acid 1 mg oral tablet: 1 tab(s) orally once a day (2022 16:14)  furosemide 20 mg oral tablet: 1 tab(s) orally once a day (2022 16:14)  gabapentin 100 mg oral capsule: 1 tab(s) orally once a day (in the morning) (2022 16:14)  gabapentin 100 mg oral capsule: 1 tab(s) orally once a day (in the afternoon) (2022 16:14)  gabapentin 400 mg oral capsule: 1 tab(s) orally once a day (in the evening) (2022 16:14)  levothyroxine 100 mcg (0.1 mg) oral tablet: 1 tab(s) orally once a day (2022 16:30)  methotrexate 2.5 mg oral tablet: 1 tab(s) orally once a week on  (2022 16:14)  oxybutynin 5 mg oral tablet: 1 tab(s) orally once a day (2022 16:14)  raloxifene 60 mg oral tablet: 1 tab(s) orally once a day (2022 16:14)  tiZANidine 2 mg oral tablet: 1 tab(s) orally 2 times a day (2022 16:14)  Zetia 10 mg oral tablet: 1 tab(s) orally once a day (2022 16:14)    MEDICATIONS:  MEDICATIONS  (STANDING):  cefTRIAXone   IVPB 1000 milliGRAM(s) IV Intermittent every 24 hours  dextrose 5% + sodium chloride 0.45% with potassium chloride 20 mEq/L 1000 milliLiter(s) (80 mL/Hr) IV Continuous <Continuous>  dextrose 5%. 1000 milliLiter(s) (50 mL/Hr) IV Continuous <Continuous>  dextrose 5%. 1000 milliLiter(s) (100 mL/Hr) IV Continuous <Continuous>  dextrose 50% Injectable 25 Gram(s) IV Push once  dextrose 50% Injectable 12.5 Gram(s) IV Push once  dextrose 50% Injectable 25 Gram(s) IV Push once  glucagon  Injectable 1 milliGRAM(s) IntraMuscular once  insulin lispro (ADMELOG) corrective regimen sliding scale   SubCutaneous three times a day before meals  insulin lispro (ADMELOG) corrective regimen sliding scale   SubCutaneous at bedtime  levothyroxine 100 MICROGram(s) Oral daily  pantoprazole  Injectable 40 milliGRAM(s) IV Push every 12 hours  pantoprazole Infusion 8 mG/Hr (10 mL/Hr) IV Continuous <Continuous>    MEDICATIONS  (PRN):  dextrose Oral Gel 15 Gram(s) Oral once PRN Blood Glucose LESS THAN 70 milliGRAM(s)/deciliter    PAST MEDICAL & SURGICAL HISTORY:  HTN (hypertension)      COPD (chronic obstructive pulmonary disease)      Arthritis      Polymyalgia rheumatica      Hyperlipidemia      Chronic diastolic heart failure      Coronary artery disease      Diabetes      H/O bilateral oophorectomy      H/O total hysterectomy        FAMILY HISTORY:    SOCIAL HISTORY:  Tobacco: [ ] Current, [ ] Former, [ ] Never; Pack Years:  Alcohol:  Illicit Drugs:    REVIEW OF SYSTEMS:  CONSTITUTIONAL: No weakness, fevers or chills  HEENT: No visual changes; No vertigo or throat pain   NECK: No pain or stiffness  RESPIRATORY: No cough, wheezing, hemoptysis; No shortness of breath  CARDIOVASCULAR: No chest pain or palpitations  GASTROINTESTINAL: As above.  GENITOURINARY: No dysuria, frequency or hematuria  NEUROLOGICAL: No numbness or weakness  SKIN: No itching, burning, rashes, or lesions   All other 10 review of systems is negative unless indicated above.    Vital Signs Last 24 Hrs  T(C): 36.8 (24 Sep 2022 11:16), Max: 36.8 (24 Sep 2022 11:16)  T(F): 98.3 (24 Sep 2022 11:16), Max: 98.3 (24 Sep 2022 11:16)  HR: 91 (24 Sep 2022 11:16) (91 - 91)  BP: 146/84 (24 Sep 2022 11:16) (146/84 - 146/84)  BP(mean): --  RR: 16 (24 Sep 2022 11:16) (16 - 16)  SpO2: 97% (24 Sep 2022 11:16) (97% - 97%)    Parameters below as of 24 Sep 2022 11:16  Patient On (Oxygen Delivery Method): room air          PHYSICAL EXAM:    General: in no acute distress  Eyes: Anicteric sclerae, moist conjunctivae  HENT: Moist mucous membranes  Neck: Trachea midline, supple  Lungs: Normal respiratory effort, no intercostal retractions  Cardiovascular: RRR  Abdomen: Patient declined abdominal exam  Extremities: Normal range of motion, No clubbing, cyanosis or edema  Neurological: Alert and oriented x3  Skin: Warm and dry. No obvious rash    LABS:                        8.8    5.05  )-----------( 190      ( 24 Sep 2022 11:56 )             27.4     09-24    142  |  108  |  13  ----------------------------<  83  4.3   |  26  |  0.74    Ca    8.2<L>      24 Sep 2022 11:56    TPro  6.6  /  Alb  3.8  /  TBili  0.7  /  DBili  x   /  AST  17  /  ALT  9<L>  /  AlkPhos  59  09-24        PT/INR - ( 24 Sep 2022 11:56 )   PT: 13.3 sec;   INR: 1.12          PTT - ( 24 Sep 2022 11:56 )  PTT:26.1 sec    Urinalysis with Rflx Culture (collected 24 Sep 2022 11:56)      RADIOLOGY & ADDITIONAL STUDIES:     Reviewed

## 2022-09-24 NOTE — CONSULT NOTE ADULT - ATTENDING COMMENTS
89 y/o F with PMH CAD s/p cath in May 2022 with PCI and CHETAN, moderate AS, HFpEF, hx GI bleed, hypothyroidism, Asthma/COPD, rheumatoid arthritis who presents from home for multiple episodes of hematochezia.     LGIB  - Previous imaging and colonoscopy with evidence of significant diverticulosis. Patient transferred to SICU for monitoring given drop in hemoglobin and multiple medical comorbidities. Patient at bedside is hemodynamically stable, warm and well perfused. She appears euvolemic on exam. She continues to have bloody bowel movements and is planned for colonoscopy with GI after adequate bowel prep.   - Hold pharmacologic DVT prophylaxis. SCDs to be placed   - Bowel prep   - 2 U PRBC ordered, monitor for hypervolemia & diurese as needed   - F/u GI  - F/u Cards re DAPT with recent PCI and CHETAN  - hold home antihypertensives and diuretics 89 y/o F with PMH CAD s/p cath in May 2022 with PCI and CHETAN, moderate AS, HFpEF, hx GI bleed, hypothyroidism, Asthma/COPD, Polymyalgia Rheumatica who presents from home for multiple episodes of hematochezia.     LGIB  - Previous imaging and colonoscopy with evidence of significant diverticulosis. Patient transferred to SICU for monitoring given drop in hemoglobin and multiple medical comorbidities. Patient at bedside is hemodynamically stable, warm and well perfused. She appears euvolemic on exam. She continues to have bloody bowel movements and is planned for colonoscopy with GI after adequate bowel prep.   - Hold pharmacologic DVT prophylaxis. SCDs to be placed   - Bowel prep   - 2 U PRBC ordered, monitor for hypervolemia & diurese as needed   - F/u GI  - F/u Cards re DAPT with recent PCI and CHETAN  - hold home antihypertensives and diuretics

## 2022-09-25 LAB
ANION GAP SERPL CALC-SCNC: 11 MMOL/L — SIGNIFICANT CHANGE UP (ref 5–17)
BUN SERPL-MCNC: 15 MG/DL — SIGNIFICANT CHANGE UP (ref 7–23)
CALCIUM SERPL-MCNC: 7.4 MG/DL — LOW (ref 8.4–10.5)
CHLORIDE SERPL-SCNC: 113 MMOL/L — HIGH (ref 96–108)
CO2 SERPL-SCNC: 21 MMOL/L — LOW (ref 22–31)
CREAT SERPL-MCNC: 0.72 MG/DL — SIGNIFICANT CHANGE UP (ref 0.5–1.3)
EGFR: 77 ML/MIN/1.73M2 — SIGNIFICANT CHANGE UP
GLUCOSE BLDC GLUCOMTR-MCNC: 79 MG/DL — SIGNIFICANT CHANGE UP (ref 70–99)
GLUCOSE BLDC GLUCOMTR-MCNC: 90 MG/DL — SIGNIFICANT CHANGE UP (ref 70–99)
GLUCOSE BLDC GLUCOMTR-MCNC: 91 MG/DL — SIGNIFICANT CHANGE UP (ref 70–99)
GLUCOSE BLDC GLUCOMTR-MCNC: 94 MG/DL — SIGNIFICANT CHANGE UP (ref 70–99)
GLUCOSE BLDC GLUCOMTR-MCNC: 97 MG/DL — SIGNIFICANT CHANGE UP (ref 70–99)
GLUCOSE SERPL-MCNC: 91 MG/DL — SIGNIFICANT CHANGE UP (ref 70–99)
HCT VFR BLD CALC: 22.8 % — LOW (ref 34.5–45)
HCT VFR BLD CALC: 23.5 % — LOW (ref 34.5–45)
HCT VFR BLD CALC: 25.3 % — LOW (ref 34.5–45)
HCT VFR BLD CALC: 25.7 % — LOW (ref 34.5–45)
HGB BLD-MCNC: 7.4 G/DL — LOW (ref 11.5–15.5)
HGB BLD-MCNC: 7.9 G/DL — LOW (ref 11.5–15.5)
HGB BLD-MCNC: 8.5 G/DL — LOW (ref 11.5–15.5)
HGB BLD-MCNC: 8.6 G/DL — LOW (ref 11.5–15.5)
MAGNESIUM SERPL-MCNC: 2 MG/DL — SIGNIFICANT CHANGE UP (ref 1.6–2.6)
MCHC RBC-ENTMCNC: 30 PG — SIGNIFICANT CHANGE UP (ref 27–34)
MCHC RBC-ENTMCNC: 30.3 PG — SIGNIFICANT CHANGE UP (ref 27–34)
MCHC RBC-ENTMCNC: 30.5 PG — SIGNIFICANT CHANGE UP (ref 27–34)
MCHC RBC-ENTMCNC: 30.7 PG — SIGNIFICANT CHANGE UP (ref 27–34)
MCHC RBC-ENTMCNC: 32.5 GM/DL — SIGNIFICANT CHANGE UP (ref 32–36)
MCHC RBC-ENTMCNC: 33.5 GM/DL — SIGNIFICANT CHANGE UP (ref 32–36)
MCHC RBC-ENTMCNC: 33.6 GM/DL — SIGNIFICANT CHANGE UP (ref 32–36)
MCHC RBC-ENTMCNC: 33.6 GM/DL — SIGNIFICANT CHANGE UP (ref 32–36)
MCV RBC AUTO: 89.4 FL — SIGNIFICANT CHANGE UP (ref 80–100)
MCV RBC AUTO: 90 FL — SIGNIFICANT CHANGE UP (ref 80–100)
MCV RBC AUTO: 91.1 FL — SIGNIFICANT CHANGE UP (ref 80–100)
MCV RBC AUTO: 94.6 FL — SIGNIFICANT CHANGE UP (ref 80–100)
NRBC # BLD: 0 /100 WBCS — SIGNIFICANT CHANGE UP (ref 0–0)
PHOSPHATE SERPL-MCNC: 3.3 MG/DL — SIGNIFICANT CHANGE UP (ref 2.5–4.5)
PLATELET # BLD AUTO: 115 K/UL — LOW (ref 150–400)
PLATELET # BLD AUTO: 133 K/UL — LOW (ref 150–400)
PLATELET # BLD AUTO: 144 K/UL — LOW (ref 150–400)
PLATELET # BLD AUTO: 145 K/UL — LOW (ref 150–400)
POTASSIUM SERPL-MCNC: 4.2 MMOL/L — SIGNIFICANT CHANGE UP (ref 3.5–5.3)
POTASSIUM SERPL-SCNC: 4.2 MMOL/L — SIGNIFICANT CHANGE UP (ref 3.5–5.3)
RBC # BLD: 2.41 M/UL — LOW (ref 3.8–5.2)
RBC # BLD: 2.61 M/UL — LOW (ref 3.8–5.2)
RBC # BLD: 2.82 M/UL — LOW (ref 3.8–5.2)
RBC # BLD: 2.83 M/UL — LOW (ref 3.8–5.2)
RBC # FLD: 15.6 % — HIGH (ref 10.3–14.5)
RBC # FLD: 15.7 % — HIGH (ref 10.3–14.5)
RBC # FLD: 16 % — HIGH (ref 10.3–14.5)
RBC # FLD: 16.5 % — HIGH (ref 10.3–14.5)
SODIUM SERPL-SCNC: 145 MMOL/L — SIGNIFICANT CHANGE UP (ref 135–145)
WBC # BLD: 7.62 K/UL — SIGNIFICANT CHANGE UP (ref 3.8–10.5)
WBC # BLD: 8.05 K/UL — SIGNIFICANT CHANGE UP (ref 3.8–10.5)
WBC # BLD: 9.22 K/UL — SIGNIFICANT CHANGE UP (ref 3.8–10.5)
WBC # BLD: 9.79 K/UL — SIGNIFICANT CHANGE UP (ref 3.8–10.5)
WBC # FLD AUTO: 7.62 K/UL — SIGNIFICANT CHANGE UP (ref 3.8–10.5)
WBC # FLD AUTO: 8.05 K/UL — SIGNIFICANT CHANGE UP (ref 3.8–10.5)
WBC # FLD AUTO: 9.22 K/UL — SIGNIFICANT CHANGE UP (ref 3.8–10.5)
WBC # FLD AUTO: 9.79 K/UL — SIGNIFICANT CHANGE UP (ref 3.8–10.5)

## 2022-09-25 PROCEDURE — 99232 SBSQ HOSP IP/OBS MODERATE 35: CPT

## 2022-09-25 PROCEDURE — 99233 SBSQ HOSP IP/OBS HIGH 50: CPT | Mod: GC

## 2022-09-25 RX ORDER — ONDANSETRON 8 MG/1
4 TABLET, FILM COATED ORAL EVERY 6 HOURS
Refills: 0 | Status: DISCONTINUED | OUTPATIENT
Start: 2022-09-25 | End: 2022-09-30

## 2022-09-25 RX ORDER — INFLUENZA VIRUS VACCINE 15; 15; 15; 15 UG/.5ML; UG/.5ML; UG/.5ML; UG/.5ML
0.7 SUSPENSION INTRAMUSCULAR ONCE
Refills: 0 | Status: DISCONTINUED | OUTPATIENT
Start: 2022-09-25 | End: 2022-09-30

## 2022-09-25 RX ORDER — SODIUM CHLORIDE 9 MG/ML
1000 INJECTION, SOLUTION INTRAVENOUS
Refills: 0 | Status: DISCONTINUED | OUTPATIENT
Start: 2022-09-25 | End: 2022-09-26

## 2022-09-25 RX ADMIN — PANTOPRAZOLE SODIUM 40 MILLIGRAM(S): 20 TABLET, DELAYED RELEASE ORAL at 06:31

## 2022-09-25 RX ADMIN — CEFTRIAXONE 100 MILLIGRAM(S): 500 INJECTION, POWDER, FOR SOLUTION INTRAMUSCULAR; INTRAVENOUS at 15:10

## 2022-09-25 RX ADMIN — PANTOPRAZOLE SODIUM 40 MILLIGRAM(S): 20 TABLET, DELAYED RELEASE ORAL at 18:03

## 2022-09-25 RX ADMIN — Medication 3 MILLILITER(S): at 23:32

## 2022-09-25 RX ADMIN — SODIUM CHLORIDE 60 MILLILITER(S): 9 INJECTION, SOLUTION INTRAVENOUS at 08:50

## 2022-09-25 RX ADMIN — Medication 80 MICROGRAM(S): at 21:52

## 2022-09-25 RX ADMIN — ONDANSETRON 4 MILLIGRAM(S): 8 TABLET, FILM COATED ORAL at 09:54

## 2022-09-25 RX ADMIN — Medication 4000 MILLILITER(S): at 00:43

## 2022-09-25 RX ADMIN — Medication 3 MILLILITER(S): at 06:30

## 2022-09-25 NOTE — PATIENT PROFILE ADULT - HAS THE PATIENT RECEIVED THE INFLUENZA VACCINE THIS SEASON?
no... Zygomaticofacial Flap Text: Given the location of the defect, shape of the defect and the proximity to free margins a zygomaticofacial flap was deemed most appropriate for repair.  Using a sterile surgical marker, the appropriate flap was drawn incorporating the defect and placing the expected incisions within the relaxed skin tension lines where possible. The area thus outlined was incised deep to adipose tissue with a #15 scalpel blade with preservation of a vascular pedicle.  The skin margins were undermined to an appropriate distance in all directions utilizing iris scissors.  The flap was then placed into the defect and anchored with interrupted buried subcutaneous sutures.

## 2022-09-25 NOTE — PROGRESS NOTE ADULT - SUBJECTIVE AND OBJECTIVE BOX
INTERVAL/OVERNIGHT EVENTS:  O/N: admitted with suspected LGIB, stepped up to ICU for multiple bloody BMs, ordered for 2U PRBC, GI consulted, started on golytely prep, cardiology consulted for AC recs in setting of recent stents, multiple dark red clots per rectum, hb 7.4 s/p 1U PRBC, 2nd transfusion running    SUBJECTIVE: Patient feeling well talking cellphone to family this AM. Denies N/V, tolerating some golytely with plans to drink more this AM. Per RN at bedside overnight had many bloody BMs but all old dark red blood and clot, no BRB. Denies SOB, CP.     Neurologic Medications    Respiratory Medications  albuterol/ipratropium for Nebulization 3 milliLiter(s) Nebulizer every 6 hours    Cardiovascular Medications    Gastrointestinal Medications  lactated ringers. 1000 milliLiter(s) IV Continuous <Continuous>  pantoprazole  Injectable 40 milliGRAM(s) IV Push two times a day    Genitourinary Medications    Hematologic/Oncologic Medications  influenza  Vaccine (HIGH DOSE) 0.7 milliLiter(s) IntraMuscular once    Antimicrobial/Immunologic Medications  cefTRIAXone   IVPB 1000 milliGRAM(s) IV Intermittent every 24 hours    Endocrine/Metabolic Medications  dextrose 50% Injectable 25 Gram(s) IV Push once  dextrose 50% Injectable 12.5 Gram(s) IV Push once  dextrose 50% Injectable 25 Gram(s) IV Push once  dextrose Oral Gel 15 Gram(s) Oral once PRN Blood Glucose LESS THAN 70 milliGRAM(s)/deciliter  glucagon  Injectable 1 milliGRAM(s) IntraMuscular once  insulin lispro (ADMELOG) corrective regimen sliding scale   SubCutaneous three times a day before meals  insulin lispro (ADMELOG) corrective regimen sliding scale   SubCutaneous at bedtime  levothyroxine Injectable 80 MICROGram(s) IV Push at bedtime    Topical/Other Medications      MEDICATIONS  (PRN):  dextrose Oral Gel 15 Gram(s) Oral once PRN Blood Glucose LESS THAN 70 milliGRAM(s)/deciliter      I&O's Detail    24 Sep 2022 07:01  -  25 Sep 2022 07:00  --------------------------------------------------------  IN:    dextrose 5% + sodium chloride 0.45% w/ Additives: 480 mL    Pantoprazole: 70 mL    PRBCs (Packed Red Blood Cells): 600 mL  Total IN: 1150 mL    OUT:    Voided (mL): 500 mL  Total OUT: 500 mL    Total NET: 650 mL          Vital Signs Last 24 Hrs  T(C): 37.1 (25 Sep 2022 04:30), Max: 37.1 (24 Sep 2022 22:30)  T(F): 98.8 (25 Sep 2022 04:30), Max: 98.8 (25 Sep 2022 01:30)  HR: 109 (25 Sep 2022 08:00) (78 - 109)  BP: 159/97 (25 Sep 2022 08:00) (125/67 - 182/92)  BP(mean): 123 (25 Sep 2022 08:00) (91 - 126)  RR: 29 (25 Sep 2022 08:00) (16 - 29)  SpO2: 100% (25 Sep 2022 08:00) (97% - 100%)    Parameters below as of 25 Sep 2022 08:00  Patient On (Oxygen Delivery Method): room air      GENERAL: NAD, resting comfortably in bed  HEENT: NCAT, MMM  C/V: Normal rate, normal peripheral perfusion, NSR   PULM: Nonlabored breathing, no respiratory distress, CTAB   ABD: Soft, ND, NT, no rebound tenderness, no guarding  EXTREM: WWP, no edema, SCDs in place  NEURO: no focal deficits, generalized weakness  PSYCH: affect appropriate     LABS:                        7.4    7.62  )-----------( 145      ( 25 Sep 2022 03:03 )             22.8         145  |  113<H>  |  15  ----------------------------<  91  4.2   |  21<L>  |  0.72    Ca    7.4<L>      25 Sep 2022 03:53  Phos  3.3       Mg     2.0         TPro  6.6  /  Alb  3.8  /  TBili  0.7  /  DBili  x   /  AST  17  /  ALT  9<L>  /  AlkPhos  59      PT/INR - ( 24 Sep 2022 11:56 )   PT: 13.3 sec;   INR: 1.12          PTT - ( 24 Sep 2022 11:56 )  PTT:26.1 sec  Urinalysis Basic - ( 24 Sep 2022 11:56 )    Color: Yellow / Appearance: Clear / S.015 / pH: x  Gluc: x / Ketone: NEGATIVE  / Bili: Negative / Urobili: 0.2 E.U./dL   Blood: x / Protein: NEGATIVE mg/dL / Nitrite: NEGATIVE   Leuk Esterase: Trace / RBC: < 5 /HPF / WBC > 10 /HPF   Sq Epi: x / Non Sq Epi: 0-5 /HPF / Bacteria: Present /HPF        RADIOLOGY & ADDITIONAL STUDIES:      Urinalysis with Rflx Culture (collected 22 @ 11:56)

## 2022-09-25 NOTE — CONSULT NOTE ADULT - SUBJECTIVE AND OBJECTIVE BOX
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 Presented to [facility] today for bloody bowel movements since this morning. Patient accompanied with grandson who contributed to history. Patient reported that this morning she woke up and went to the bathroom and noted black and bright red mixed stools, this is the first time that this happens since prior admission on 2021. She indicated that she had 2 additional BM described as the one mentioned before. She mentioned feeling lightheaded after the episodes, but didn't fall or LOC. She denied any other symptoms like abdominal pain, nausea, vomit, diarrhea, constipation. After being discharged from prior GI bleed admission, patient followed up with GI doctor, but she didn't have any other workup because "she was too old to have a colonoscopy". Most recent admission on May 2022 for cardiac catheterization.    In the ED: Accompanied by alba, resting comfortably in bed, patient HD stable, labs significant for Hb 8.8 from 11 (2022), and + UTI.     PMH: As above  PSHx: Hysterectomy, oophorectomy, cardiac catheterization May 2022  Meds: Losartan, aspirin, gabapentin, raloxifene, folic acid, allopurinol, furosemide, oxybutynin, ezetimibe, tizanidine, plavix  Allergies: ACEi, sulfa      PAST MEDICAL HISTORY:  HTN (hypertension)    COPD (chronic obstructive pulmonary disease)    Arthritis    Polymyalgia rheumatica    Hyperlipidemia    Chronic diastolic heart failure    Coronary artery disease    Diabetes    Moderate aortic stenosis        PAST SURGICAL HISTORY:  H/O bilateral oophorectomy    H/O total hysterectomy      ALLERGIES:  Fosamax (Angioedema)  lisinopril (Angioedema)  statins (Unknown)  sulfa drugs (Hives)  tetanus immune globulin (Hives)      Vitals:  Vital Signs Last 24 Hrs  T(C): 36.8 (24 Sep 2022 11:16), Max: 36.8 (24 Sep 2022 11:16)  T(F): 98.3 (24 Sep 2022 11:16), Max: 98.3 (24 Sep 2022 11:16)  HR: 91 (24 Sep 2022 11:16) (91 - 91)  BP: 146/84 (24 Sep 2022 11:16) (146/84 - 146/84)  BP(mean): --  RR: 16 (24 Sep 2022 11:16) (16 - 16)  SpO2: 97% (24 Sep 2022 11:16) (97% - 97%)    Parameters below as of 24 Sep 2022 11:16  Patient On (Oxygen Delivery Method): room air          PHYSICAL EXAM:    General: NAD, resting comfortably in bed  Pulm: Nonlabored breathing, no respiratory distress, saturating 94% on RA  Abd: Soft, NTND  Rectal: Dark blood noted around anus, on rectal exam dark blood noted, no active bleeding, no hemorrhoids palpated, no stool noted on glove  Extrem: WWP  Neuro: A/O x 3  Pulses: Palpable distal pulses     I&o's:  I&O's Summary      LABS:                        8.8    5.05  )-----------( 190      ( 24 Sep 2022 11:56 )             27.4     09-24    142  |  108  |  13  ----------------------------<  83  4.3   |  26  |  0.74    Ca    8.2<L>      24 Sep 2022 11:56    TPro  6.6  /  Alb  3.8  /  TBili  0.7  /  DBili  x   /  AST  17  /  ALT  9<L>  /  AlkPhos  59      Lactate:    PT/INR - ( 24 Sep 2022 11:56 )   PT: 13.3 sec;   INR: 1.12          PTT - ( 24 Sep 2022 11:56 )  PTT:26.1 sec          Urinalysis Basic - ( 24 Sep 2022 11:56 )    Color: Yellow / Appearance: Clear / S.015 / pH: x  Gluc: x / Ketone: NEGATIVE  / Bili: Negative / Urobili: 0.2 E.U./dL   Blood: x / Protein: NEGATIVE mg/dL / Nitrite: NEGATIVE   Leuk Esterase: Trace / RBC: < 5 /HPF / WBC > 10 /HPF   Sq Epi: x / Non Sq Epi: 0-5 /HPF / Bacteria: Present /HPF        MICRO:     IMAGING:       (24 Sep 2022 13:54)      ROS: A 10-point review of systems was otherwise negative.    PAST MEDICAL & SURGICAL HISTORY:  HTN (hypertension)      COPD (chronic obstructive pulmonary disease)      Arthritis      Polymyalgia rheumatica      Hyperlipidemia      Chronic diastolic heart failure      Coronary artery disease      Diabetes      H/O bilateral oophorectomy      H/O total hysterectomy          SOCIAL HISTORY:  FAMILY HISTORY:      ALLERGIES: 	  Fosamax (Angioedema)  lisinopril (Angioedema)  statins (Unknown)  sulfa drugs (Hives)  tetanus immune globulin (Hives)            MEDICATIONS:  albuterol/ipratropium for Nebulization 3 milliLiter(s) Nebulizer every 6 hours  cefTRIAXone   IVPB 1000 milliGRAM(s) IV Intermittent every 24 hours  dextrose 50% Injectable 25 Gram(s) IV Push once  dextrose 50% Injectable 12.5 Gram(s) IV Push once  dextrose 50% Injectable 25 Gram(s) IV Push once  dextrose Oral Gel 15 Gram(s) Oral once PRN  glucagon  Injectable 1 milliGRAM(s) IntraMuscular once  influenza  Vaccine (HIGH DOSE) 0.7 milliLiter(s) IntraMuscular once  insulin lispro (ADMELOG) corrective regimen sliding scale   SubCutaneous three times a day before meals  insulin lispro (ADMELOG) corrective regimen sliding scale   SubCutaneous at bedtime  lactated ringers. 1000 milliLiter(s) IV Continuous <Continuous>  levothyroxine Injectable 80 MICROGram(s) IV Push at bedtime  pantoprazole  Injectable 40 milliGRAM(s) IV Push two times a day      PHYSICAL EXAM:  T(C): 37.1 (22 @ 04:30), Max: 37.1 (22 @ 22:30)  HR: 109 (22 @ 08:00) (78 - 109)  BP: 159/97 (22 @ 08:00) (125/67 - 182/92)  RR: 29 (22 @ 08:00) (16 - 29)  SpO2: 100% (22 @ 08:00) (97% - 100%)  Wt(kg): --    GEN: Awake, comfortable. NAD.   HEENT: NCAT, PERRL, EOMI. Mucosa moist. No JVD.   RESP: CTA b/l  CV: RRR, normal s1/s2. No m/r/g.  ABD: Soft, NTND. BS+  EXT: Warm. No edema, clubbing, or cyanosis.   NEURO: AAOx3. No focal deficits.    I&O's Summary    24 Sep 2022 07:01  -  25 Sep 2022 07:00  --------------------------------------------------------  IN: 1150 mL / OUT: 500 mL / NET: 650 mL      Height (cm): 152.4 ( @ 11:16)  Weight (kg): 51.7 ( @ 11:16)  BMI (kg/m2): 22.3 ( @ 11:16)  BSA (m2): 1.47 ( @ 11:16)  	  LABS:	 	    CARDIAC MARKERS:                                  7.4    7.62  )-----------( 145      ( 25 Sep 2022 03:03 )             22.8         145  |  113<H>  |  15  ----------------------------<  91  4.2   |  21<L>  |  0.72    Ca    7.4<L>      25 Sep 2022 03:53  Phos  3.3       Mg     2.0         TPro  6.6  /  Alb  3.8  /  TBili  0.7  /  DBili  x   /  AST  17  /  ALT  9<L>  /  AlkPhos  59      proBNP:   Lipid Profile:   HgA1c:   TSH:     TELEMETRY: 	    ECG:  	  RADIOLOGY:   ECHO:  STRESS:  CATH:   HPI:  89F w/ HTN, HLD, DM, CAD s/p PCI to mRCA 22), moderate AS, HFpEF (EF 65% May 2022), hx GI bleed (2021 @ [facility], 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 On 22, pt presented w/ black and bright red mixed stools. On 22, she had an ECG/Colonoscopy, that revealed a non-erosive gastritis, single polyp in fundus, possible GIST, and severe pan-diverticulitis.  On presentation, she had a Hgb of 4.6, but hemodynamically stable. She received 2 units PRBCs. Most recent Hgb of 7.4, has remained hemodynamically stable.      PMH: As above  PSHx: Hysterectomy, oophorectomy, cardiac catheterization May 2022  Meds: Losartan, aspirin, gabapentin, raloxifene, folic acid, allopurinol, furosemide, oxybutynin, ezetimibe, tizanidine, plavix  Allergies: ACEi, sulfa      PAST MEDICAL HISTORY:  HTN (hypertension)    COPD (chronic obstructive pulmonary disease)    Arthritis    Polymyalgia rheumatica    Hyperlipidemia    Chronic diastolic heart failure    Coronary artery disease    Diabetes    Moderate aortic stenosis        PAST SURGICAL HISTORY:  H/O bilateral oophorectomy    H/O total hysterectomy      ALLERGIES:  Fosamax (Angioedema)  lisinopril (Angioedema)  statins (Unknown)  sulfa drugs (Hives)  tetanus immune globulin (Hives)      Vitals:  Vital Signs Last 24 Hrs  T(C): 36.8 (24 Sep 2022 11:16), Max: 36.8 (24 Sep 2022 11:16)  T(F): 98.3 (24 Sep 2022 11:16), Max: 98.3 (24 Sep 2022 11:16)  HR: 91 (24 Sep 2022 11:16) (91 - 91)  BP: 146/84 (24 Sep 2022 11:16) (146/84 - 146/84)  BP(mean): --  RR: 16 (24 Sep 2022 11:16) (16 - 16)  SpO2: 97% (24 Sep 2022 11:16) (97% - 97%)    Parameters below as of 24 Sep 2022 11:16  Patient On (Oxygen Delivery Method): room air          PHYSICAL EXAM:    General: NAD, resting comfortably in bed  Pulm: Nonlabored breathing,CTAB  CV: RRR, warm, well perfused  Abd: Soft, NTND  Extrem: WWP  Neuro: A/O x 3    I&o's:  I&O's Summary      LABS:                        8.8    5.05  )-----------( 190      ( 24 Sep 2022 11:56 )             27.4         142  |  108  |  13  ----------------------------<  83  4.3   |  26  |  0.74    Ca    8.2<L>      24 Sep 2022 11:56    TPro  6.6  /  Alb  3.8  /  TBili  0.7  /  DBili  x   /  AST  17  /  ALT  9<L>  /  AlkPhos  59  24    Lactate:    PT/INR - ( 24 Sep 2022 11:56 )   PT: 13.3 sec;   INR: 1.12          PTT - ( 24 Sep 2022 11:56 )  PTT:26.1 sec          Urinalysis Basic - ( 24 Sep 2022 11:56 )    Color: Yellow / Appearance: Clear / S.015 / pH: x  Gluc: x / Ketone: NEGATIVE  / Bili: Negative / Urobili: 0.2 E.U./dL   Blood: x / Protein: NEGATIVE mg/dL / Nitrite: NEGATIVE   Leuk Esterase: Trace / RBC: < 5 /HPF / WBC > 10 /HPF   Sq Epi: x / Non Sq Epi: 0-5 /HPF / Bacteria: Present /HPF        MICRO:     IMAGING:       (24 Sep 2022 13:54)      ROS: A 10-point review of systems was otherwise negative.    PAST MEDICAL & SURGICAL HISTORY:  HTN (hypertension)      COPD (chronic obstructive pulmonary disease)      Arthritis      Polymyalgia rheumatica      Hyperlipidemia      Chronic diastolic heart failure      Coronary artery disease      Diabetes      H/O bilateral oophorectomy      H/O total hysterectomy          SOCIAL HISTORY:  FAMILY HISTORY:      ALLERGIES: 	  Fosamax (Angioedema)  lisinopril (Angioedema)  statins (Unknown)  sulfa drugs (Hives)  tetanus immune globulin (Hives)            MEDICATIONS:  albuterol/ipratropium for Nebulization 3 milliLiter(s) Nebulizer every 6 hours  cefTRIAXone   IVPB 1000 milliGRAM(s) IV Intermittent every 24 hours  dextrose 50% Injectable 25 Gram(s) IV Push once  dextrose 50% Injectable 12.5 Gram(s) IV Push once  dextrose 50% Injectable 25 Gram(s) IV Push once  dextrose Oral Gel 15 Gram(s) Oral once PRN  glucagon  Injectable 1 milliGRAM(s) IntraMuscular once  influenza  Vaccine (HIGH DOSE) 0.7 milliLiter(s) IntraMuscular once  insulin lispro (ADMELOG) corrective regimen sliding scale   SubCutaneous three times a day before meals  insulin lispro (ADMELOG) corrective regimen sliding scale   SubCutaneous at bedtime  lactated ringers. 1000 milliLiter(s) IV Continuous <Continuous>  levothyroxine Injectable 80 MICROGram(s) IV Push at bedtime  pantoprazole  Injectable 40 milliGRAM(s) IV Push two times a day      GEN: Awake, comfortable. NAD.   HEENT: NCAT, PERRL, EOMI. Mucosa moist. No JVD.   RESP: CTA b/l  CV: RRR, normal s1/s2. No m/r/g.  ABD: Soft, NTND. BS+  EXT: Warm. No edema, clubbing, or cyanosis.   NEURO: AAOx3. No focal deficits.    I&O's Summary    24 Sep 2022 07:01  -  25 Sep 2022 07:00  --------------------------------------------------------  IN: 1150 mL / OUT: 500 mL / NET: 650 mL      Height (cm): 152.4 ( @ 11:16)  Weight (kg): 51.7 ( @ 11:16)  BMI (kg/m2): 22.3 ( @ 11:16)  BSA (m2): 1.47 ( @ 11:16)  	  LABS:	 	    CARDIAC MARKERS:                                  7.4    7.62  )-----------( 145      ( 25 Sep 2022 03:03 )             22.8         145  |  113<H>  |  15  ----------------------------<  91  4.2   |  21<L>  |  0.72    Ca    7.4<L>      25 Sep 2022 03:53  Phos  3.3     -  Mg     2.0     -    TPro  6.6  /  Alb  3.8  /  TBili  0.7  /  DBili  x   /  AST  17  /  ALT  9<L>  /  AlkPhos  59      proBNP:   Lipid Profile:   HgA1c:   TSH:     TELEMETRY: 	    ECG:  	  RADIOLOGY:   ECHO:  STRESS:  CATH:   HPI:  89F w/ HTN, HLD, DM, CAD s/p PCI to mRCA 22), moderate AS, HFpEF (EF 65% May 2022), hx GI bleed (2021 @ 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 On 22, pt presented w/ black and bright red mixed stools. On 22, she had an ECG/Colonoscopy, that revealed a non-erosive gastritis, single polyp in fundus, possible GIST, and severe pan-diverticulitis.  On presentation, she had a Hgb of 4.6, but hemodynamically stable. She received 2 units PRBCs. Most recent Hgb of 7.4, has remained hemodynamically stable.      PMH: As above  PSHx: Hysterectomy, oophorectomy, cardiac catheterization May 2022  Meds: Losartan, aspirin, gabapentin, raloxifene, folic acid, allopurinol, furosemide, oxybutynin, ezetimibe, tizanidine, plavix  Allergies: ACEi, sulfa      PAST MEDICAL HISTORY:  HTN (hypertension)    COPD (chronic obstructive pulmonary disease)    Arthritis    Polymyalgia rheumatica    Hyperlipidemia    Chronic diastolic heart failure    Coronary artery disease    Diabetes    Moderate aortic stenosis        PAST SURGICAL HISTORY:  H/O bilateral oophorectomy    H/O total hysterectomy      ALLERGIES:  Fosamax (Angioedema)  lisinopril (Angioedema)  statins (Unknown)  sulfa drugs (Hives)  tetanus immune globulin (Hives)      Vitals:  Vital Signs Last 24 Hrs  T(C): 36.8 (24 Sep 2022 11:16), Max: 36.8 (24 Sep 2022 11:16)  T(F): 98.3 (24 Sep 2022 11:16), Max: 98.3 (24 Sep 2022 11:16)  HR: 91 (24 Sep 2022 11:16) (91 - 91)  BP: 146/84 (24 Sep 2022 11:16) (146/84 - 146/84)  BP(mean): --  RR: 16 (24 Sep 2022 11:16) (16 - 16)  SpO2: 97% (24 Sep 2022 11:16) (97% - 97%)    Parameters below as of 24 Sep 2022 11:16  Patient On (Oxygen Delivery Method): room air          PHYSICAL EXAM:    General: NAD, resting comfortably in bed  Pulm: Nonlabored breathing,CTAB  CV: RRR, warm, well perfused  Abd: Soft, NTND  Extrem: WWP  Neuro: A/O x 3    I&o's:  I&O's Summary      LABS:                        8.8    5.05  )-----------( 190      ( 24 Sep 2022 11:56 )             27.4         142  |  108  |  13  ----------------------------<  83  4.3   |  26  |  0.74    Ca    8.2<L>      24 Sep 2022 11:56    TPro  6.6  /  Alb  3.8  /  TBili  0.7  /  DBili  x   /  AST  17  /  ALT  9<L>  /  AlkPhos  59  24    Lactate:    PT/INR - ( 24 Sep 2022 11:56 )   PT: 13.3 sec;   INR: 1.12          PTT - ( 24 Sep 2022 11:56 )  PTT:26.1 sec          Urinalysis Basic - ( 24 Sep 2022 11:56 )    Color: Yellow / Appearance: Clear / S.015 / pH: x  Gluc: x / Ketone: NEGATIVE  / Bili: Negative / Urobili: 0.2 E.U./dL   Blood: x / Protein: NEGATIVE mg/dL / Nitrite: NEGATIVE   Leuk Esterase: Trace / RBC: < 5 /HPF / WBC > 10 /HPF   Sq Epi: x / Non Sq Epi: 0-5 /HPF / Bacteria: Present /HPF        MICRO:     IMAGING:       (24 Sep 2022 13:54)      ROS: A 10-point review of systems was otherwise negative.    PAST MEDICAL & SURGICAL HISTORY:  HTN (hypertension)      COPD (chronic obstructive pulmonary disease)      Arthritis      Polymyalgia rheumatica      Hyperlipidemia      Chronic diastolic heart failure      Coronary artery disease      Diabetes      H/O bilateral oophorectomy      H/O total hysterectomy          SOCIAL HISTORY:  FAMILY HISTORY:      ALLERGIES: 	  Fosamax (Angioedema)  lisinopril (Angioedema)  statins (Unknown)  sulfa drugs (Hives)  tetanus immune globulin (Hives)            MEDICATIONS:  albuterol/ipratropium for Nebulization 3 milliLiter(s) Nebulizer every 6 hours  cefTRIAXone   IVPB 1000 milliGRAM(s) IV Intermittent every 24 hours  dextrose 50% Injectable 25 Gram(s) IV Push once  dextrose 50% Injectable 12.5 Gram(s) IV Push once  dextrose 50% Injectable 25 Gram(s) IV Push once  dextrose Oral Gel 15 Gram(s) Oral once PRN  glucagon  Injectable 1 milliGRAM(s) IntraMuscular once  influenza  Vaccine (HIGH DOSE) 0.7 milliLiter(s) IntraMuscular once  insulin lispro (ADMELOG) corrective regimen sliding scale   SubCutaneous three times a day before meals  insulin lispro (ADMELOG) corrective regimen sliding scale   SubCutaneous at bedtime  lactated ringers. 1000 milliLiter(s) IV Continuous <Continuous>  levothyroxine Injectable 80 MICROGram(s) IV Push at bedtime  pantoprazole  Injectable 40 milliGRAM(s) IV Push two times a day      GEN: Awake, comfortable. NAD.   HEENT: NCAT, PERRL, EOMI. Mucosa moist. No JVD.   RESP: CTA b/l  CV: RRR, normal s1/s2. No m/r/g.  ABD: Soft, NTND. BS+  EXT: Warm. No edema, clubbing, or cyanosis.   NEURO: AAOx3. No focal deficits.    I&O's Summary    24 Sep 2022 07:01  -  25 Sep 2022 07:00  --------------------------------------------------------  IN: 1150 mL / OUT: 500 mL / NET: 650 mL      Height (cm): 152.4 ( @ 11:16)  Weight (kg): 51.7 ( @ 11:16)  BMI (kg/m2): 22.3 ( @ 11:16)  BSA (m2): 1.47 ( @ 11:16)  	  LABS:	 	    CARDIAC MARKERS:                                  7.4    7.62  )-----------( 145      ( 25 Sep 2022 03:03 )             22.8         145  |  113<H>  |  15  ----------------------------<  91  4.2   |  21<L>  |  0.72    Ca    7.4<L>      25 Sep 2022 03:53  Phos  3.3       Mg     2.0         TPro  6.6  /  Alb  3.8  /  TBili  0.7  /  DBili  x   /  AST  17  /  ALT  9<L>  /  AlkPhos  59

## 2022-09-25 NOTE — PROVIDER CONTACT NOTE (OTHER) - SITUATION
small asymetry noted to patient right side cheek, patient lying supine calm quiet finishing blood transfusion.

## 2022-09-25 NOTE — PROGRESS NOTE ADULT - SUBJECTIVE AND OBJECTIVE BOX
GASTROENTEROLOGY PROGRESS NOTE  Patient seen and examined at bedside.  Hgb 7.9, s/p 2 u from low of 4.6.  Currently HDS    PERTINENT REVIEW OF SYSTEMS:  CONSTITUTIONAL: No weakness, fevers or chills  HEENT: No visual changes; No vertigo or throat pain   GASTROINTESTINAL: As above.  NEUROLOGICAL: No numbness or weakness  SKIN: No itching, burning, rashes, or lesions     Allergies    Fosamax (Angioedema)  lisinopril (Angioedema)  sulfa drugs (Hives)  tetanus immune globulin (Hives)    Intolerances    statins (Unknown)    MEDICATIONS:  MEDICATIONS  (STANDING):  albuterol/ipratropium for Nebulization 3 milliLiter(s) Nebulizer every 6 hours  cefTRIAXone   IVPB 1000 milliGRAM(s) IV Intermittent every 24 hours  dextrose 50% Injectable 25 Gram(s) IV Push once  dextrose 50% Injectable 12.5 Gram(s) IV Push once  dextrose 50% Injectable 25 Gram(s) IV Push once  glucagon  Injectable 1 milliGRAM(s) IntraMuscular once  influenza  Vaccine (HIGH DOSE) 0.7 milliLiter(s) IntraMuscular once  insulin lispro (ADMELOG) corrective regimen sliding scale   SubCutaneous three times a day before meals  insulin lispro (ADMELOG) corrective regimen sliding scale   SubCutaneous at bedtime  lactated ringers. 1000 milliLiter(s) (60 mL/Hr) IV Continuous <Continuous>  levothyroxine Injectable 80 MICROGram(s) IV Push at bedtime  pantoprazole  Injectable 40 milliGRAM(s) IV Push two times a day    MEDICATIONS  (PRN):  dextrose Oral Gel 15 Gram(s) Oral once PRN Blood Glucose LESS THAN 70 milliGRAM(s)/deciliter  ondansetron Injectable 4 milliGRAM(s) IV Push every 6 hours PRN Nausea and/or Vomiting    Vital Signs Last 24 Hrs  T(C): 36.9 (25 Sep 2022 13:36), Max: 37.1 (24 Sep 2022 22:30)  T(F): 98.4 (25 Sep 2022 13:36), Max: 98.8 (25 Sep 2022 01:30)  HR: 98 (25 Sep 2022 14:00) (78 - 109)  BP: 152/75 (25 Sep 2022 14:00) (109/60 - 182/92)  BP(mean): 102 (25 Sep 2022 14:00) (77 - 126)  RR: 26 (25 Sep 2022 14:00) (16 - 29)  SpO2: 99% (25 Sep 2022 14:00) (93% - 100%)    Parameters below as of 25 Sep 2022 14:00  Patient On (Oxygen Delivery Method): room air         @ 07:01  -   @ 07:00  --------------------------------------------------------  IN: 1150 mL / OUT: 500 mL / NET: 650 mL      PHYSICAL EXAM:    General: in no acute distress  HEENT: MMM, conjunctiva and sclera clear  Gastrointestinal: Soft non-tender non-distended; No rebound or guarding  Skin: Warm and dry. No obvious rash    LABS:                        7.9    9.22  )-----------( 133      ( 25 Sep 2022 14:58 )             23.5         145  |  113<H>  |  15  ----------------------------<  91  4.2   |  21<L>  |  0.72    Ca    7.4<L>      25 Sep 2022 03:53  Phos  3.3       Mg     2.0         TPro  6.6  /  Alb  3.8  /  TBili  0.7  /  DBili  x   /  AST  17  /  ALT  9<L>  /  AlkPhos  59  24    PT/INR - ( 24 Sep 2022 11:56 )   PT: 13.3 sec;   INR: 1.12          PTT - ( 24 Sep 2022 11:56 )  PTT:26.1 sec      Urinalysis Basic - ( 24 Sep 2022 11:56 )    Color: Yellow / Appearance: Clear / S.015 / pH: x  Gluc: x / Ketone: NEGATIVE  / Bili: Negative / Urobili: 0.2 E.U./dL   Blood: x / Protein: NEGATIVE mg/dL / Nitrite: NEGATIVE   Leuk Esterase: Trace / RBC: < 5 /HPF / WBC > 10 /HPF   Sq Epi: x / Non Sq Epi: 0-5 /HPF / Bacteria: Present /HPF                Culture - Urine (collected 24 Sep 2022 11:56)  Source: Clean Catch None  Preliminary Report (25 Sep 2022 11:16):    50,000 CFU/ml Streptococcus agalactiae (Group B) Susceptibility to follow.    50,000 CFU/ml Hafnia alvei Susceptibility to follow.    Urinalysis with Rflx Culture (collected 24 Sep 2022 11:56)      RADIOLOGY & ADDITIONAL STUDIES:  Reviewed

## 2022-09-25 NOTE — PATIENT PROFILE ADULT - FALL HARM RISK - HARM RISK INTERVENTIONS

## 2022-09-25 NOTE — CONSULT NOTE ADULT - ASSESSMENT
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 Cardiology has been consulted for DAPT recommendations in the setting of recurrent bleed.    GIB in the setting of stable CAD and recent PCI:  - On 5/2/22, underwent an elective PCI to [facility] w/ 2.75 x 24 mm Synergy XD. mLAD 40% calcific stenosis. Rest of vessels patent with luminal irregularities  - Known severe pan-diverticulitis, likely etiology for her presenting hematochezia  - Hold DAPT (ASA and Plavix) for 48hrs, resume plavix 75 mg qd and discontinue ASA if no evidence of recurrence of GIB and H/H stable   - If concern for re-bleed, in 48 hours, patient can be started on IV cangrelor and eventually transitioned to Plavix  - Cardiology consult team will continue to follow

## 2022-09-25 NOTE — PATIENT PROFILE ADULT - NSPROEXTENSIONSOFSELF_GEN_A_NUR
Patient Specific Counseling (Will Not Stick From Patient To Patient): Patient was given iron tabs from Dr [facility]. Detail Level: Detailed Detail Level: Zone walker

## 2022-09-25 NOTE — PROGRESS NOTE ADULT - SUBJECTIVE AND OBJECTIVE BOX
SUBJECTIVE: Pt seen and examined at bedside this am by surgery team. Patient is lying comfortably in bed with no complaints. Pain well controlled with current regimen. Patient denies fever, nausea, vomiting, chest pain, and shortness of breath. Patient is currently drinking golytely prep and has had 2 large bloody BMs.     MEDICATIONS  (STANDING):  albuterol/ipratropium for Nebulization 3 milliLiter(s) Nebulizer every 6 hours  cefTRIAXone   IVPB 1000 milliGRAM(s) IV Intermittent every 24 hours  dextrose 50% Injectable 25 Gram(s) IV Push once  dextrose 50% Injectable 12.5 Gram(s) IV Push once  dextrose 50% Injectable 25 Gram(s) IV Push once  glucagon  Injectable 1 milliGRAM(s) IntraMuscular once  influenza  Vaccine (HIGH DOSE) 0.7 milliLiter(s) IntraMuscular once  insulin lispro (ADMELOG) corrective regimen sliding scale   SubCutaneous three times a day before meals  insulin lispro (ADMELOG) corrective regimen sliding scale   SubCutaneous at bedtime  lactated ringers. 1000 milliLiter(s) (60 mL/Hr) IV Continuous <Continuous>  levothyroxine Injectable 80 MICROGram(s) IV Push at bedtime  pantoprazole  Injectable 40 milliGRAM(s) IV Push two times a day    MEDICATIONS  (PRN):  dextrose Oral Gel 15 Gram(s) Oral once PRN Blood Glucose LESS THAN 70 milliGRAM(s)/deciliter  ondansetron Injectable 4 milliGRAM(s) IV Push every 6 hours PRN Nausea and/or Vomiting      Vital Signs Last 24 Hrs  T(C): 37.1 (25 Sep 2022 18:00), Max: 37.1 (24 Sep 2022 22:30)  T(F): 98.7 (25 Sep 2022 18:00), Max: 98.8 (25 Sep 2022 01:30)  HR: 88 (25 Sep 2022 20:00) (78 - 109)  BP: 154/72 (25 Sep 2022 20:00) (109/60 - 182/92)  BP(mean): 103 (25 Sep 2022 20:00) (77 - 126)  RR: 27 (25 Sep 2022 20:00) (16 - 34)  SpO2: 100% (25 Sep 2022 20:00) (93% - 100%)    Parameters below as of 25 Sep 2022 20:00  Patient On (Oxygen Delivery Method): room air        Physical Exam  General: Patient is doing well and lying in bed comfortably  Constitutional: alert and oriented   Pulm: Nonlabored breathing, no respiratory distress  CV: Regular rate and rhythm, normal sinus rhythm  Abd:  soft, nontender, nondistended. No rebound, no guarding.   Extremities: warm, well perfused, no edema      I&O's Detail    24 Sep 2022 07:01  -  25 Sep 2022 07:00  --------------------------------------------------------  IN:    dextrose 5% + sodium chloride 0.45% w/ Additives: 480 mL    Pantoprazole: 70 mL    PRBCs (Packed Red Blood Cells): 600 mL  Total IN: 1150 mL    OUT:    Voided (mL): 500 mL  Total OUT: 500 mL    Total NET: 650 mL      25 Sep 2022 07:01  -  25 Sep 2022 20:25  --------------------------------------------------------  IN:    Lactated Ringers: 360 mL    Oral Fluid: 300 mL    PRBCs (Packed Red Blood Cells): 360 mL  Total IN: 1020 mL    OUT:  Total OUT: 0 mL    Total NET: 1020 mL        LABS:                        7.9    9.22  )-----------( 133      ( 25 Sep 2022 14:58 )             23.5         145  |  113<H>  |  15  ----------------------------<  91  4.2   |  21<L>  |  0.72    Ca    7.4<L>      25 Sep 2022 03:53  Phos  3.3       Mg     2.0         TPro  6.6  /  Alb  3.8  /  TBili  0.7  /  DBili  x   /  AST  17  /  ALT  9<L>  /  AlkPhos  59      PT/INR - ( 24 Sep 2022 11:56 )   PT: 13.3 sec;   INR: 1.12          PTT - ( 24 Sep 2022 11:56 )  PTT:26.1 sec  Urinalysis Basic - ( 24 Sep 2022 11:56 )    Color: Yellow / Appearance: Clear / S.015 / pH: x  Gluc: x / Ketone: NEGATIVE  / Bili: Negative / Urobili: 0.2 E.U./dL   Blood: x / Protein: NEGATIVE mg/dL / Nitrite: NEGATIVE   Leuk Esterase: Trace / RBC: < 5 /HPF / WBC > 10 /HPF   Sq Epi: x / Non Sq Epi: 0-5 /HPF / Bacteria: Present /HPF

## 2022-09-26 ENCOUNTER — TRANSCRIPTION ENCOUNTER (OUTPATIENT)
Age: 87
End: 2022-09-26

## 2022-09-26 LAB
-  AMPICILLIN/SULBACTAM: SIGNIFICANT CHANGE UP
-  AMPICILLIN: SIGNIFICANT CHANGE UP
-  CEFAZOLIN: SIGNIFICANT CHANGE UP
-  CEFTRIAXONE: SIGNIFICANT CHANGE UP
-  CIPROFLOXACIN: SIGNIFICANT CHANGE UP
-  CLINDAMYCIN: SIGNIFICANT CHANGE UP
-  ERTAPENEM: SIGNIFICANT CHANGE UP
-  GENTAMICIN: SIGNIFICANT CHANGE UP
-  LEVOFLOXACIN: SIGNIFICANT CHANGE UP
-  MEROPENEM: SIGNIFICANT CHANGE UP
-  NITROFURANTOIN: SIGNIFICANT CHANGE UP
-  PENICILLIN: SIGNIFICANT CHANGE UP
-  PIPERACILLIN/TAZOBACTAM: SIGNIFICANT CHANGE UP
-  TOBRAMYCIN: SIGNIFICANT CHANGE UP
-  TRIMETHOPRIM/SULFAMETHOXAZOLE: SIGNIFICANT CHANGE UP
-  VANCOMYCIN: SIGNIFICANT CHANGE UP
ANION GAP SERPL CALC-SCNC: 13 MMOL/L — SIGNIFICANT CHANGE UP (ref 5–17)
ANION GAP SERPL CALC-SCNC: 14 MMOL/L — SIGNIFICANT CHANGE UP (ref 5–17)
BUN SERPL-MCNC: 11 MG/DL — SIGNIFICANT CHANGE UP (ref 7–23)
BUN SERPL-MCNC: 12 MG/DL — SIGNIFICANT CHANGE UP (ref 7–23)
CALCIUM SERPL-MCNC: 7.5 MG/DL — LOW (ref 8.4–10.5)
CALCIUM SERPL-MCNC: 7.6 MG/DL — LOW (ref 8.4–10.5)
CHLORIDE SERPL-SCNC: 110 MMOL/L — HIGH (ref 96–108)
CHLORIDE SERPL-SCNC: 112 MMOL/L — HIGH (ref 96–108)
CO2 SERPL-SCNC: 19 MMOL/L — LOW (ref 22–31)
CO2 SERPL-SCNC: 21 MMOL/L — LOW (ref 22–31)
CREAT SERPL-MCNC: 0.7 MG/DL — SIGNIFICANT CHANGE UP (ref 0.5–1.3)
CREAT SERPL-MCNC: 0.73 MG/DL — SIGNIFICANT CHANGE UP (ref 0.5–1.3)
CULTURE RESULTS: SIGNIFICANT CHANGE UP
EGFR: 76 ML/MIN/1.73M2 — SIGNIFICANT CHANGE UP
EGFR: 80 ML/MIN/1.73M2 — SIGNIFICANT CHANGE UP
GLUCOSE BLDC GLUCOMTR-MCNC: 78 MG/DL — SIGNIFICANT CHANGE UP (ref 70–99)
GLUCOSE BLDC GLUCOMTR-MCNC: 83 MG/DL — SIGNIFICANT CHANGE UP (ref 70–99)
GLUCOSE BLDC GLUCOMTR-MCNC: 87 MG/DL — SIGNIFICANT CHANGE UP (ref 70–99)
GLUCOSE SERPL-MCNC: 76 MG/DL — SIGNIFICANT CHANGE UP (ref 70–99)
GLUCOSE SERPL-MCNC: 76 MG/DL — SIGNIFICANT CHANGE UP (ref 70–99)
HCT VFR BLD CALC: 23 % — LOW (ref 34.5–45)
HCT VFR BLD CALC: 24.1 % — LOW (ref 34.5–45)
HCT VFR BLD CALC: 24.1 % — LOW (ref 34.5–45)
HGB BLD-MCNC: 7.8 G/DL — LOW (ref 11.5–15.5)
HGB BLD-MCNC: 8.1 G/DL — LOW (ref 11.5–15.5)
HGB BLD-MCNC: 8.4 G/DL — LOW (ref 11.5–15.5)
MAGNESIUM SERPL-MCNC: 2 MG/DL — SIGNIFICANT CHANGE UP (ref 1.6–2.6)
MCHC RBC-ENTMCNC: 30.1 PG — SIGNIFICANT CHANGE UP (ref 27–34)
MCHC RBC-ENTMCNC: 30.7 PG — SIGNIFICANT CHANGE UP (ref 27–34)
MCHC RBC-ENTMCNC: 31.5 PG — SIGNIFICANT CHANGE UP (ref 27–34)
MCHC RBC-ENTMCNC: 33.6 GM/DL — SIGNIFICANT CHANGE UP (ref 32–36)
MCHC RBC-ENTMCNC: 33.9 GM/DL — SIGNIFICANT CHANGE UP (ref 32–36)
MCHC RBC-ENTMCNC: 34.9 GM/DL — SIGNIFICANT CHANGE UP (ref 32–36)
MCV RBC AUTO: 89.6 FL — SIGNIFICANT CHANGE UP (ref 80–100)
MCV RBC AUTO: 90.3 FL — SIGNIFICANT CHANGE UP (ref 80–100)
MCV RBC AUTO: 90.6 FL — SIGNIFICANT CHANGE UP (ref 80–100)
METHOD TYPE: SIGNIFICANT CHANGE UP
METHOD TYPE: SIGNIFICANT CHANGE UP
NRBC # BLD: 0 /100 WBCS — SIGNIFICANT CHANGE UP (ref 0–0)
ORGANISM # SPEC MICROSCOPIC CNT: SIGNIFICANT CHANGE UP
PHOSPHATE SERPL-MCNC: 2.9 MG/DL — SIGNIFICANT CHANGE UP (ref 2.5–4.5)
PLATELET # BLD AUTO: 110 K/UL — LOW (ref 150–400)
PLATELET # BLD AUTO: 113 K/UL — LOW (ref 150–400)
PLATELET # BLD AUTO: 124 K/UL — LOW (ref 150–400)
POTASSIUM SERPL-MCNC: 3.5 MMOL/L — SIGNIFICANT CHANGE UP (ref 3.5–5.3)
POTASSIUM SERPL-MCNC: 3.8 MMOL/L — SIGNIFICANT CHANGE UP (ref 3.5–5.3)
POTASSIUM SERPL-SCNC: 3.5 MMOL/L — SIGNIFICANT CHANGE UP (ref 3.5–5.3)
POTASSIUM SERPL-SCNC: 3.8 MMOL/L — SIGNIFICANT CHANGE UP (ref 3.5–5.3)
RBC # BLD: 2.54 M/UL — LOW (ref 3.8–5.2)
RBC # BLD: 2.67 M/UL — LOW (ref 3.8–5.2)
RBC # BLD: 2.69 M/UL — LOW (ref 3.8–5.2)
RBC # FLD: 15.8 % — HIGH (ref 10.3–14.5)
RBC # FLD: 15.9 % — HIGH (ref 10.3–14.5)
RBC # FLD: 15.9 % — HIGH (ref 10.3–14.5)
SODIUM SERPL-SCNC: 144 MMOL/L — SIGNIFICANT CHANGE UP (ref 135–145)
SODIUM SERPL-SCNC: 145 MMOL/L — SIGNIFICANT CHANGE UP (ref 135–145)
SPECIMEN SOURCE: SIGNIFICANT CHANGE UP
WBC # BLD: 6.63 K/UL — SIGNIFICANT CHANGE UP (ref 3.8–10.5)
WBC # BLD: 7.66 K/UL — SIGNIFICANT CHANGE UP (ref 3.8–10.5)
WBC # BLD: 7.8 K/UL — SIGNIFICANT CHANGE UP (ref 3.8–10.5)
WBC # FLD AUTO: 6.63 K/UL — SIGNIFICANT CHANGE UP (ref 3.8–10.5)
WBC # FLD AUTO: 7.66 K/UL — SIGNIFICANT CHANGE UP (ref 3.8–10.5)
WBC # FLD AUTO: 7.8 K/UL — SIGNIFICANT CHANGE UP (ref 3.8–10.5)

## 2022-09-26 PROCEDURE — 99233 SBSQ HOSP IP/OBS HIGH 50: CPT | Mod: GC

## 2022-09-26 PROCEDURE — 99233 SBSQ HOSP IP/OBS HIGH 50: CPT

## 2022-09-26 PROCEDURE — 99232 SBSQ HOSP IP/OBS MODERATE 35: CPT | Mod: GC

## 2022-09-26 PROCEDURE — 45378 DIAGNOSTIC COLONOSCOPY: CPT

## 2022-09-26 RX ORDER — MIDAZOLAM HYDROCHLORIDE 1 MG/ML
2 INJECTION, SOLUTION INTRAMUSCULAR; INTRAVENOUS ONCE
Refills: 0 | Status: DISCONTINUED | OUTPATIENT
Start: 2022-09-26 | End: 2022-09-26

## 2022-09-26 RX ORDER — FENTANYL CITRATE 50 UG/ML
75 INJECTION INTRAVENOUS ONCE
Refills: 0 | Status: DISCONTINUED | OUTPATIENT
Start: 2022-09-26 | End: 2022-09-26

## 2022-09-26 RX ORDER — POTASSIUM CHLORIDE 20 MEQ
20 PACKET (EA) ORAL ONCE
Refills: 0 | Status: COMPLETED | OUTPATIENT
Start: 2022-09-26 | End: 2022-09-26

## 2022-09-26 RX ORDER — MIDAZOLAM HYDROCHLORIDE 1 MG/ML
1 INJECTION, SOLUTION INTRAMUSCULAR; INTRAVENOUS ONCE
Refills: 0 | Status: DISCONTINUED | OUTPATIENT
Start: 2022-09-26 | End: 2022-09-26

## 2022-09-26 RX ORDER — FENTANYL CITRATE 50 UG/ML
100 INJECTION INTRAVENOUS ONCE
Refills: 0 | Status: DISCONTINUED | OUTPATIENT
Start: 2022-09-26 | End: 2022-09-26

## 2022-09-26 RX ORDER — POTASSIUM PHOSPHATE, MONOBASIC POTASSIUM PHOSPHATE, DIBASIC 236; 224 MG/ML; MG/ML
15 INJECTION, SOLUTION INTRAVENOUS ONCE
Refills: 0 | Status: COMPLETED | OUTPATIENT
Start: 2022-09-26 | End: 2022-09-26

## 2022-09-26 RX ADMIN — POTASSIUM PHOSPHATE, MONOBASIC POTASSIUM PHOSPHATE, DIBASIC 62.5 MILLIMOLE(S): 236; 224 INJECTION, SOLUTION INTRAVENOUS at 05:37

## 2022-09-26 RX ADMIN — MIDAZOLAM HYDROCHLORIDE 1 MILLIGRAM(S): 1 INJECTION, SOLUTION INTRAMUSCULAR; INTRAVENOUS at 17:20

## 2022-09-26 RX ADMIN — Medication 3 MILLILITER(S): at 17:26

## 2022-09-26 RX ADMIN — Medication 3 MILLILITER(S): at 06:00

## 2022-09-26 RX ADMIN — Medication 50 MILLIEQUIVALENT(S): at 05:38

## 2022-09-26 RX ADMIN — PANTOPRAZOLE SODIUM 40 MILLIGRAM(S): 20 TABLET, DELAYED RELEASE ORAL at 18:46

## 2022-09-26 RX ADMIN — FENTANYL CITRATE 75 MICROGRAM(S): 50 INJECTION INTRAVENOUS at 17:19

## 2022-09-26 RX ADMIN — PANTOPRAZOLE SODIUM 40 MILLIGRAM(S): 20 TABLET, DELAYED RELEASE ORAL at 05:37

## 2022-09-26 RX ADMIN — Medication 3 MILLILITER(S): at 13:25

## 2022-09-26 RX ADMIN — Medication 80 MICROGRAM(S): at 22:03

## 2022-09-26 RX ADMIN — MIDAZOLAM HYDROCHLORIDE 2 MILLIGRAM(S): 1 INJECTION, SOLUTION INTRAMUSCULAR; INTRAVENOUS at 17:21

## 2022-09-26 RX ADMIN — FENTANYL CITRATE 75 MICROGRAM(S): 50 INJECTION INTRAVENOUS at 17:40

## 2022-09-26 NOTE — DIETITIAN INITIAL EVALUATION ADULT - PERTINENT MEDS FT
MEDICATIONS  (STANDING):  albuterol/ipratropium for Nebulization 3 milliLiter(s) Nebulizer every 6 hours  dextrose 50% Injectable 25 Gram(s) IV Push once  dextrose 50% Injectable 12.5 Gram(s) IV Push once  dextrose 50% Injectable 25 Gram(s) IV Push once  glucagon  Injectable 1 milliGRAM(s) IntraMuscular once  influenza  Vaccine (HIGH DOSE) 0.7 milliLiter(s) IntraMuscular once  insulin lispro (ADMELOG) corrective regimen sliding scale   SubCutaneous three times a day before meals  insulin lispro (ADMELOG) corrective regimen sliding scale   SubCutaneous at bedtime  lactated ringers. 1000 milliLiter(s) (60 mL/Hr) IV Continuous <Continuous>  levothyroxine Injectable 80 MICROGram(s) IV Push at bedtime  pantoprazole  Injectable 40 milliGRAM(s) IV Push two times a day    MEDICATIONS  (PRN):  dextrose Oral Gel 15 Gram(s) Oral once PRN Blood Glucose LESS THAN 70 milliGRAM(s)/deciliter  ondansetron Injectable 4 milliGRAM(s) IV Push every 6 hours PRN Nausea and/or Vomiting

## 2022-09-26 NOTE — CHART NOTE - NSCHARTNOTEFT_GEN_A_CORE
Colonoscopy with Dr. Ramos at bedside    Diverticulosis of the whole colon but mostly concentrated on the sigmoid colon.  	No active bleeding was noted however due to severe frothing the visualization was limited for any smaller colonic lesions.  Plan:	  Monitor CBC and transfuse PRN  Can resume clopidogrel and monitor for any evidence of further bleeding  Diet as tolerated

## 2022-09-26 NOTE — PROGRESS NOTE ADULT - SUBJECTIVE AND OBJECTIVE BOX
HPI: There were no overnight events.  Patient resting comfortably and endorses abdominal discomfort in the LLQ; however, denies any nausea, vomiting, chest pain, dizziness, and SOB.    PAST MEDICAL & SURGICAL HISTORY:  HTN (hypertension)  COPD (chronic obstructive pulmonary disease)  Arthritis  Polymyalgia rheumatica  Hyperlipidemia  Chronic diastolic heart failure  Coronary artery disease  Diabetes  H/O bilateral oophorectomy  H/O total hysterectomy      MEDICATIONS  (STANDING):  albuterol/ipratropium for Nebulization 3 milliLiter(s) Nebulizer every 6 hours  dextrose 50% Injectable 25 Gram(s) IV Push once  dextrose 50% Injectable 12.5 Gram(s) IV Push once  dextrose 50% Injectable 25 Gram(s) IV Push once  glucagon  Injectable 1 milliGRAM(s) IntraMuscular once  influenza  Vaccine (HIGH DOSE) 0.7 milliLiter(s) IntraMuscular once  insulin lispro (ADMELOG) corrective regimen sliding scale   SubCutaneous three times a day before meals  insulin lispro (ADMELOG) corrective regimen sliding scale   SubCutaneous at bedtime  lactated ringers. 1000 milliLiter(s) (60 mL/Hr) IV Continuous <Continuous>  levothyroxine Injectable 80 MICROGram(s) IV Push at bedtime  pantoprazole  Injectable 40 milliGRAM(s) IV Push two times a day    MEDICATIONS  (PRN):  dextrose Oral Gel 15 Gram(s) Oral once PRN Blood Glucose LESS THAN 70 milliGRAM(s)/deciliter  ondansetron Injectable 4 milliGRAM(s) IV Push every 6 hours PRN Nausea and/or Vomiting    Allergies  - Fosamax (Angioedema)  - lisinopril (Angioedema)  - sulfa drugs (Hives)  - tetanus immune globulin (Hives)    Intolerances  - statins (Unknown)        T(C): 36.9 (09-26-22 @ 05:49), Max: 37.2 (09-26-22 @ 01:00)  HR: 98 (09-26-22 @ 07:00) (80 - 108)  BP: 193/81 (09-26-22 @ 07:00) (107/59 - 193/81)  RR: 27 (09-26-22 @ 07:00) (14 - 34)  SpO2: 99% (09-26-22 @ 07:00) (93% - 100%)    GENERAL: NAD, Resting comfortably in bed, awake, opens eyes spontaneously  HEENT: NCAT, MMM, Normal conjunctiva, PERRL  RESP: Nonlabored breathing, No respiratory distress  CARD: Normal rate, Normal peripheral perfusion  GI: Soft, ND, NT, No guarding, No rebound tenderness  EXTREM: WWP, No edema, No gross deformity of extremities  SKIN: No rashes, no lesions  NEURO: AAOx3, No focal motor or sensory deficits  PSYCH: Affect and characteristics of appearance, verbalizations, and behaviors are appropriate    LABS:                        8.1    7.66  )-----------( 113      ( 26 Sep 2022 03:20 )             24.1     09-26    145  |  112<H>  |  12  ----------------------------<  76  3.5   |  19<L>  |  0.73    Ca    7.6<L>      26 Sep 2022 03:20  Phos  2.9     09-26  Mg     2.0     09-26    TPro  6.6  /  Alb  3.8  /  TBili  0.7  /  DBili  x   /  AST  17  /  ALT  9<L>  /  AlkPhos  59  09-24    PT/INR - ( 24 Sep 2022 11:56 )   PT: 13.3 sec;   INR: 1.12          PTT - ( 24 Sep 2022 11:56 )  PTT:26.1 sec  LIVER FUNCTIONS - ( 24 Sep 2022 11:56 )  Alb: 3.8 g/dL / Pro: 6.6 g/dL / ALK PHOS: 59 U/L / ALT: 9 U/L / AST: 17 U/L / GGT: x

## 2022-09-26 NOTE — DIETITIAN INITIAL EVALUATION ADULT - OTHER INFO
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 Presented to [facility] on 9/24 for bloody bowel movements since the morning. In the ED, pt afebrile, HDS, nontoxic appearing, initially admitted to telemetry with GI/cardiology consults and q6 CBCs. S/p multiple episodes of blood per rectum and repeat hb of 6.5 from 8.8. Transferred to the SICU in the setting of presumed LGIB, pending scope, scheduled for today 9/26.    Pt seen resting in bed. Remains NPO for pending C scope. She denies any changes in wt,  lbs,  lbs. Per wt hx in EMR, weight has been steadily declining 2021, highest wt of 134 lbs in Feb 2021, which indicates wt decrease of 20 lbs/15% in >1 year. She denies changes in appetite PTA. Has Aide at home that helps with meal preps and grocery procurement. Eats 2-3 meals per day. Per diet hx, suspect small portion sizes. Takes MVI, B12, Mg at home. Drinks 2 Ensures per day. Denies swallowing/chewing issues. NFPE performed, noted moderate-severe losses. Meets ASPEN criteria for malnutrition. At this time, denies n/v. Reports still have bloody BM but less bloody than previously. Reports liquid. Per flowsheet, BMs are dark red, last BM 9/26. Deferred diet edu until diet able to be adv. No cultural, ethnic, Hinduism food preferences noted. NKFA per pt, reports allergy to split peas when she was younger, unsure about allergy now. RD to follow per protocol. Please see below for nutr recs.

## 2022-09-26 NOTE — DIETITIAN INITIAL EVALUATION ADULT - SIGNS/SYMPTOMS
complains of pain/discomfort AEB ~15% wt loss in >1 year, NFPE findings moderate-severe fat/muscle wasting

## 2022-09-26 NOTE — PROGRESS NOTE ADULT - SUBJECTIVE AND OBJECTIVE BOX
INTERVAL EVENTS:    PAST MEDICAL & SURGICAL HISTORY:  HTN (hypertension)    COPD (chronic obstructive pulmonary disease)    Arthritis    Polymyalgia rheumatica    Hyperlipidemia    Chronic diastolic heart failure    Coronary artery disease    Diabetes    Moderate aortic stenosis    H/O bilateral oophorectomy    H/O total hysterectomy        MEDICATIONS  (STANDING):  albuterol/ipratropium for Nebulization 3 milliLiter(s) Nebulizer every 6 hours  dextrose 50% Injectable 25 Gram(s) IV Push once  dextrose 50% Injectable 12.5 Gram(s) IV Push once  dextrose 50% Injectable 25 Gram(s) IV Push once  glucagon  Injectable 1 milliGRAM(s) IntraMuscular once  influenza  Vaccine (HIGH DOSE) 0.7 milliLiter(s) IntraMuscular once  insulin lispro (ADMELOG) corrective regimen sliding scale   SubCutaneous three times a day before meals  insulin lispro (ADMELOG) corrective regimen sliding scale   SubCutaneous at bedtime  levothyroxine Injectable 80 MICROGram(s) IV Push at bedtime  pantoprazole  Injectable 40 milliGRAM(s) IV Push two times a day    MEDICATIONS  (PRN):  dextrose Oral Gel 15 Gram(s) Oral once PRN Blood Glucose LESS THAN 70 milliGRAM(s)/deciliter  ondansetron Injectable 4 milliGRAM(s) IV Push every 6 hours PRN Nausea and/or Vomiting    T(F): 99.1 (09-26-22 @ 18:15), Max: 99.2 (09-26-22 @ 09:00)  HR: 84 (09-26-22 @ 18:30) (73 - 109)  BP: 116/60 (09-26-22 @ 18:30) (107/59 - 193/81)  BP(mean): 82 (09-26-22 @ 18:30) (79 - 139)  ABP: --  ABP(mean): --  RR: 26 (09-26-22 @ 18:30) (13 - 37)  SpO2: 98% (09-26-22 @ 18:30) (93% - 100%)    I/O Detail 24H    25 Sep 2022 07:01  -  26 Sep 2022 07:00  --------------------------------------------------------  IN:    IV PiggyBack: 155 mL    Lactated Ringers: 900 mL    Oral Fluid: 300 mL    PRBCs (Packed Red Blood Cells): 360 mL  Total IN: 1715 mL    OUT:    Voided (mL): 500 mL  Total OUT: 500 mL    Total NET: 1215 mL      26 Sep 2022 07:01  -  26 Sep 2022 19:10  --------------------------------------------------------  IN:    IV PiggyBack: 250 mL    Lactated Ringers: 480 mL  Total IN: 730 mL    OUT:    Voided (mL): 650 mL  Total OUT: 650 mL    Total NET: 80 mL          PHYSICAL EXAM:  GEN: NAD  HEENT: EOMI   RESP: CTA b/l  CV: RRR. Normal S1/S2. No m/r/g.  ABD: soft, non-distended  EXT: No edema   NEURO: alert and attentive    LABS:  CBC 09-26-22 @ 16:30                        7.8    6.63  )-----------( 110                   23.0       Hgb trend: 7.8 <-- , 8.4 <-- , 8.1 <-- , 8.6 <-- , 7.9 <-- , 8.5 <-- , 7.4 <-- , 6.5 <-- , 4.6 <-- , 8.8 <--   WBC trend: 6.63 <-- , 7.80 <-- , 7.66 <-- , 8.05 <-- , 9.22 <-- , 9.79 <-- , 7.62 <-- , 5.50 <-- , 4.19 <-- , 5.05 <--       CMP 09-26-22 @ 08:51    144  |  110<H>  |  11  ----------------------------<  76  3.8   |  21<L>  |  0.70    Ca    7.5<L>      09-26-22 @ 08:51  Phos  2.9     09-26  Mg     2.0     09-26        Serum Cr trend: 0.70 <-- , 0.73 <-- , 0.72 <-- , 0.70 <-- , 0.74 <--         Cardiac Markers           STUDIES:

## 2022-09-26 NOTE — PROGRESS NOTE ADULT - SUBJECTIVE AND OBJECTIVE BOX
24 hr events:  ON: 9pm CBC Hg 8.6, 3 am Hg 8.1. Papa repleted.   : started IVF 60cc/hr, BP dropped to 110's @9am with c/o nausea and feeling week after Bloody given 500cc bolus repeat CBC:8.5 repeat @2: 7.9, additional 1u PRBC  O/N: stepped up to ICU, ordered for 2U PRBC, GI consulted, started on golytely prep, cardiology consulted for AC recs in setting of recent stents, multiple dark red clots per rectum, hb 7.4 s/p 1U PRBC, 2nd transfusion running    SUBJECTIVE:  NAEON. Denies abd pain. Denies n/v. Denies lightheadedness/dizziness. Denies cp/sob. Endorses some red tinged BMs overnight w/ golytely.    MEDICATIONS  (STANDING):  albuterol/ipratropium for Nebulization 3 milliLiter(s) Nebulizer every 6 hours  dextrose 50% Injectable 25 Gram(s) IV Push once  dextrose 50% Injectable 12.5 Gram(s) IV Push once  dextrose 50% Injectable 25 Gram(s) IV Push once  glucagon  Injectable 1 milliGRAM(s) IntraMuscular once  influenza  Vaccine (HIGH DOSE) 0.7 milliLiter(s) IntraMuscular once  insulin lispro (ADMELOG) corrective regimen sliding scale   SubCutaneous three times a day before meals  insulin lispro (ADMELOG) corrective regimen sliding scale   SubCutaneous at bedtime  lactated ringers. 1000 milliLiter(s) (60 mL/Hr) IV Continuous <Continuous>  levothyroxine Injectable 80 MICROGram(s) IV Push at bedtime  pantoprazole  Injectable 40 milliGRAM(s) IV Push two times a day    MEDICATIONS  (PRN):  dextrose Oral Gel 15 Gram(s) Oral once PRN Blood Glucose LESS THAN 70 milliGRAM(s)/deciliter  ondansetron Injectable 4 milliGRAM(s) IV Push every 6 hours PRN Nausea and/or Vomiting      ICU Vital Signs Last 24 Hrs  T(C): 36.9 (26 Sep 2022 05:49), Max: 37.2 (26 Sep 2022 01:00)  T(F): 98.4 (26 Sep 2022 05:49), Max: 98.9 (26 Sep 2022 01:00)  HR: 98 (26 Sep 2022 07:00) (80 - 108)  BP: 193/81 (26 Sep 2022 07:00) (107/59 - 193/81)  BP(mean): 116 (26 Sep 2022 07:00) (77 - 139)  ABP: --  ABP(mean): --  RR: 27 (26 Sep 2022 07:00) (14 - 34)  SpO2: 99% (26 Sep 2022 07:) (93% - 100%)    O2 Parameters below as of 26 Sep 2022 07:00  Patient On (Oxygen Delivery Method): room air      Physical Exam:  General: NAD  HEENT: NC/AT, EOMI, PERRLA, normal hearing, no oral lesions, neck supple w/o LAD  Pulmonary: Nonlabored breathing, no respiratory distress, CTA-B  Cardiovascular: NSR, no murmurs  Abdominal: soft, NT/ND  Extremities: WWP, 5/5 strength x 4, no clubbing/cyanosis/edema  Neuro: A/O x3, CNs II-XII grossly intact, normal motor/sensation, no focal deficits  Pulses: palpable distal pulses    I&O's Summary    25 Sep 2022 07:01  -  26 Sep 2022 07:00  --------------------------------------------------------  IN: 1715 mL / OUT: 500 mL / NET: 1215 mL    26 Sep 2022 07:01  -  26 Sep 2022 08:31  --------------------------------------------------------  IN: 62.5 mL / OUT: 0 mL / NET: 62.5 mL        LABS:                        8.1    7.66  )-----------( 113      ( 26 Sep 2022 03:20 )             24.1     09-26    145  |  112<H>  |  12  ----------------------------<  76  3.5   |  19<L>  |  0.73    Ca    7.6<L>      26 Sep 2022 03:20  Phos  2.9       Mg     2.0         TPro  6.6  /  Alb  3.8  /  TBili  0.7  /  DBili  x   /  AST  17  /  ALT  9<L>  /  AlkPhos  59      PT/INR - ( 24 Sep 2022 11:56 )   PT: 13.3 sec;   INR: 1.12          PTT - ( 24 Sep 2022 11:56 )  PTT:26.1 sec  Urinalysis Basic - ( 24 Sep 2022 11:56 )    Color: Yellow / Appearance: Clear / S.015 / pH: x  Gluc: x / Ketone: NEGATIVE  / Bili: Negative / Urobili: 0.2 E.U./dL   Blood: x / Protein: NEGATIVE mg/dL / Nitrite: NEGATIVE   Leuk Esterase: Trace / RBC: < 5 /HPF / WBC > 10 /HPF   Sq Epi: x / Non Sq Epi: 0-5 /HPF / Bacteria: Present /HPF      CAPILLARY BLOOD GLUCOSE      POCT Blood Glucose.: 78 mg/dL (26 Sep 2022 06:58)  POCT Blood Glucose.: 90 mg/dL (25 Sep 2022 21:53)  POCT Blood Glucose.: 91 mg/dL (25 Sep 2022 16:45)  POCT Blood Glucose.: 94 mg/dL (25 Sep 2022 11:54)  POCT Blood Glucose.: 97 mg/dL (25 Sep 2022 09:14)    LIVER FUNCTIONS - ( 24 Sep 2022 11:56 )  Alb: 3.8 g/dL / Pro: 6.6 g/dL / ALK PHOS: 59 U/L / ALT: 9 U/L / AST: 17 U/L / GGT: x             Cultures:Culture Results:   50,000 CFU/ml Streptococcus agalactiae (Group B) Susceptibility to follow.  50,000 CFU/ml Hafnia alvei Susceptibility to follow. ( @ 11:56)

## 2022-09-26 NOTE — DIETITIAN NUTRITION RISK NOTIFICATION - TREATMENT: THE FOLLOWING DIET HAS BEEN RECOMMENDED
Diet, NPO (09-24-22 @ 14:19) [Active]    1. NPO, diet adv as medically feasible, rec adv to regular diet as tolerated + Ensure Enlive BID (700 kcal, 40g protein, 360 mL free H2O)   >> consider adding CST CHO pending POCT/BG trends  2. BM and pain regimen per team  3. Monitor GI, wts, skin  4. Monitor BMP, BG, POCT q6hrs, renal indices, LFTs, lytes, replete prn  5. diet edu prn

## 2022-09-26 NOTE — DIETITIAN INITIAL EVALUATION ADULT - ORAL INTAKE PTA/DIET HISTORY
Enjoys a full breakfast per pt report: eggs, toast, sausage  L: BLT sandwich, usually eats 1/2  D: Soups, variety of different foods.

## 2022-09-26 NOTE — DIETITIAN INITIAL EVALUATION ADULT - ADD RECOMMEND
1. NPO, diet adv as medically feasible, rec adv to regular diet as tolerated  >> consider adding CST CHO pending POCT/BG trends  2. BM and pain regimen per team  3. Monitor GI, wts, skin  4. Monitor BMP, BG, POCT q6hrs, renal indices, LFTs, lytes, replete prn  5. diet edu prn 1. NPO, diet adv as medically feasible, rec adv to regular diet as tolerated + Ensure Enlive BID (700 kcal, 40g protein, 360 mL free H2O)   >> consider adding CST CHO pending POCT/BG trends  2. BM and pain regimen per team  3. Monitor GI, wts, skin  4. Monitor BMP, BG, POCT q6hrs, renal indices, LFTs, lytes, replete prn  5. diet edu prn

## 2022-09-27 LAB
ANION GAP SERPL CALC-SCNC: 10 MMOL/L — SIGNIFICANT CHANGE UP (ref 5–17)
ANION GAP SERPL CALC-SCNC: 9 MMOL/L — SIGNIFICANT CHANGE UP (ref 5–17)
BLD GP AB SCN SERPL QL: NEGATIVE — SIGNIFICANT CHANGE UP
BUN SERPL-MCNC: 4 MG/DL — LOW (ref 7–23)
BUN SERPL-MCNC: 5 MG/DL — LOW (ref 7–23)
CALCIUM SERPL-MCNC: 7.6 MG/DL — LOW (ref 8.4–10.5)
CALCIUM SERPL-MCNC: 8 MG/DL — LOW (ref 8.4–10.5)
CHLORIDE SERPL-SCNC: 109 MMOL/L — HIGH (ref 96–108)
CHLORIDE SERPL-SCNC: 110 MMOL/L — HIGH (ref 96–108)
CO2 SERPL-SCNC: 23 MMOL/L — SIGNIFICANT CHANGE UP (ref 22–31)
CO2 SERPL-SCNC: 23 MMOL/L — SIGNIFICANT CHANGE UP (ref 22–31)
CREAT SERPL-MCNC: 0.61 MG/DL — SIGNIFICANT CHANGE UP (ref 0.5–1.3)
CREAT SERPL-MCNC: 0.74 MG/DL — SIGNIFICANT CHANGE UP (ref 0.5–1.3)
EGFR: 75 ML/MIN/1.73M2 — SIGNIFICANT CHANGE UP
EGFR: 83 ML/MIN/1.73M2 — SIGNIFICANT CHANGE UP
GLUCOSE BLDC GLUCOMTR-MCNC: 122 MG/DL — HIGH (ref 70–99)
GLUCOSE BLDC GLUCOMTR-MCNC: 129 MG/DL — HIGH (ref 70–99)
GLUCOSE BLDC GLUCOMTR-MCNC: 75 MG/DL — SIGNIFICANT CHANGE UP (ref 70–99)
GLUCOSE SERPL-MCNC: 112 MG/DL — HIGH (ref 70–99)
GLUCOSE SERPL-MCNC: 76 MG/DL — SIGNIFICANT CHANGE UP (ref 70–99)
HCT VFR BLD CALC: 24.8 % — LOW (ref 34.5–45)
HCT VFR BLD CALC: 26.2 % — LOW (ref 34.5–45)
HCT VFR BLD CALC: 30 % — LOW (ref 34.5–45)
HGB BLD-MCNC: 10.1 G/DL — LOW (ref 11.5–15.5)
HGB BLD-MCNC: 8.4 G/DL — LOW (ref 11.5–15.5)
HGB BLD-MCNC: 9.1 G/DL — LOW (ref 11.5–15.5)
MAGNESIUM SERPL-MCNC: 1.9 MG/DL — SIGNIFICANT CHANGE UP (ref 1.6–2.6)
MAGNESIUM SERPL-MCNC: 2.1 MG/DL — SIGNIFICANT CHANGE UP (ref 1.6–2.6)
MCHC RBC-ENTMCNC: 30.3 PG — SIGNIFICANT CHANGE UP (ref 27–34)
MCHC RBC-ENTMCNC: 30.3 PG — SIGNIFICANT CHANGE UP (ref 27–34)
MCHC RBC-ENTMCNC: 30.8 PG — SIGNIFICANT CHANGE UP (ref 27–34)
MCHC RBC-ENTMCNC: 33.7 GM/DL — SIGNIFICANT CHANGE UP (ref 32–36)
MCHC RBC-ENTMCNC: 33.9 GM/DL — SIGNIFICANT CHANGE UP (ref 32–36)
MCHC RBC-ENTMCNC: 34.7 GM/DL — SIGNIFICANT CHANGE UP (ref 32–36)
MCV RBC AUTO: 88.8 FL — SIGNIFICANT CHANGE UP (ref 80–100)
MCV RBC AUTO: 89.5 FL — SIGNIFICANT CHANGE UP (ref 80–100)
MCV RBC AUTO: 90.1 FL — SIGNIFICANT CHANGE UP (ref 80–100)
NRBC # BLD: 0 /100 WBCS — SIGNIFICANT CHANGE UP (ref 0–0)
PHOSPHATE SERPL-MCNC: 2.8 MG/DL — SIGNIFICANT CHANGE UP (ref 2.5–4.5)
PHOSPHATE SERPL-MCNC: 3.1 MG/DL — SIGNIFICANT CHANGE UP (ref 2.5–4.5)
PLATELET # BLD AUTO: 103 K/UL — LOW (ref 150–400)
PLATELET # BLD AUTO: 104 K/UL — LOW (ref 150–400)
PLATELET # BLD AUTO: 120 K/UL — LOW (ref 150–400)
POTASSIUM SERPL-MCNC: 2.9 MMOL/L — CRITICAL LOW (ref 3.5–5.3)
POTASSIUM SERPL-MCNC: 3.6 MMOL/L — SIGNIFICANT CHANGE UP (ref 3.5–5.3)
POTASSIUM SERPL-SCNC: 2.9 MMOL/L — CRITICAL LOW (ref 3.5–5.3)
POTASSIUM SERPL-SCNC: 3.6 MMOL/L — SIGNIFICANT CHANGE UP (ref 3.5–5.3)
RBC # BLD: 2.77 M/UL — LOW (ref 3.8–5.2)
RBC # BLD: 2.95 M/UL — LOW (ref 3.8–5.2)
RBC # BLD: 3.33 M/UL — LOW (ref 3.8–5.2)
RBC # FLD: 15.1 % — HIGH (ref 10.3–14.5)
RBC # FLD: 15.1 % — HIGH (ref 10.3–14.5)
RBC # FLD: 15.4 % — HIGH (ref 10.3–14.5)
RH IG SCN BLD-IMP: POSITIVE — SIGNIFICANT CHANGE UP
SODIUM SERPL-SCNC: 141 MMOL/L — SIGNIFICANT CHANGE UP (ref 135–145)
SODIUM SERPL-SCNC: 143 MMOL/L — SIGNIFICANT CHANGE UP (ref 135–145)
WBC # BLD: 5.82 K/UL — SIGNIFICANT CHANGE UP (ref 3.8–10.5)
WBC # BLD: 6.01 K/UL — SIGNIFICANT CHANGE UP (ref 3.8–10.5)
WBC # BLD: 6.76 K/UL — SIGNIFICANT CHANGE UP (ref 3.8–10.5)
WBC # FLD AUTO: 5.82 K/UL — SIGNIFICANT CHANGE UP (ref 3.8–10.5)
WBC # FLD AUTO: 6.01 K/UL — SIGNIFICANT CHANGE UP (ref 3.8–10.5)
WBC # FLD AUTO: 6.76 K/UL — SIGNIFICANT CHANGE UP (ref 3.8–10.5)

## 2022-09-27 PROCEDURE — 99233 SBSQ HOSP IP/OBS HIGH 50: CPT

## 2022-09-27 PROCEDURE — 99232 SBSQ HOSP IP/OBS MODERATE 35: CPT | Mod: GC

## 2022-09-27 PROCEDURE — 99223 1ST HOSP IP/OBS HIGH 75: CPT | Mod: GC

## 2022-09-27 PROCEDURE — 99222 1ST HOSP IP/OBS MODERATE 55: CPT

## 2022-09-27 PROCEDURE — 99232 SBSQ HOSP IP/OBS MODERATE 35: CPT

## 2022-09-27 RX ORDER — CEFTRIAXONE 500 MG/1
1000 INJECTION, POWDER, FOR SOLUTION INTRAMUSCULAR; INTRAVENOUS EVERY 24 HOURS
Refills: 0 | Status: COMPLETED | OUTPATIENT
Start: 2022-09-27 | End: 2022-09-27

## 2022-09-27 RX ORDER — POTASSIUM CHLORIDE 20 MEQ
10 PACKET (EA) ORAL
Refills: 0 | Status: DISCONTINUED | OUTPATIENT
Start: 2022-09-27 | End: 2022-09-27

## 2022-09-27 RX ORDER — NITROFURANTOIN MACROCRYSTAL 50 MG
100 CAPSULE ORAL
Refills: 0 | Status: DISCONTINUED | OUTPATIENT
Start: 2022-09-27 | End: 2022-09-30

## 2022-09-27 RX ORDER — GABAPENTIN 400 MG/1
100 CAPSULE ORAL THREE TIMES A DAY
Refills: 0 | Status: DISCONTINUED | OUTPATIENT
Start: 2022-09-27 | End: 2022-09-30

## 2022-09-27 RX ORDER — MAGNESIUM SULFATE 500 MG/ML
1 VIAL (ML) INJECTION ONCE
Refills: 0 | Status: COMPLETED | OUTPATIENT
Start: 2022-09-27 | End: 2022-09-27

## 2022-09-27 RX ORDER — LEVOTHYROXINE SODIUM 125 MCG
100 TABLET ORAL DAILY
Refills: 0 | Status: DISCONTINUED | OUTPATIENT
Start: 2022-09-27 | End: 2022-09-30

## 2022-09-27 RX ORDER — SODIUM,POTASSIUM PHOSPHATES 278-250MG
1 POWDER IN PACKET (EA) ORAL ONCE
Refills: 0 | Status: COMPLETED | OUTPATIENT
Start: 2022-09-27 | End: 2022-09-27

## 2022-09-27 RX ORDER — POTASSIUM CHLORIDE 20 MEQ
40 PACKET (EA) ORAL
Refills: 0 | Status: COMPLETED | OUTPATIENT
Start: 2022-09-27 | End: 2022-09-27

## 2022-09-27 RX ORDER — POTASSIUM CHLORIDE 20 MEQ
40 PACKET (EA) ORAL ONCE
Refills: 0 | Status: DISCONTINUED | OUTPATIENT
Start: 2022-09-27 | End: 2022-09-29

## 2022-09-27 RX ORDER — ALLOPURINOL 300 MG
100 TABLET ORAL DAILY
Refills: 0 | Status: DISCONTINUED | OUTPATIENT
Start: 2022-09-27 | End: 2022-09-30

## 2022-09-27 RX ORDER — POTASSIUM CHLORIDE 20 MEQ
20 PACKET (EA) ORAL ONCE
Refills: 0 | Status: DISCONTINUED | OUTPATIENT
Start: 2022-09-27 | End: 2022-09-29

## 2022-09-27 RX ORDER — POTASSIUM CHLORIDE 20 MEQ
20 PACKET (EA) ORAL ONCE
Refills: 0 | Status: COMPLETED | OUTPATIENT
Start: 2022-09-27 | End: 2022-09-27

## 2022-09-27 RX ORDER — AMLODIPINE BESYLATE 2.5 MG/1
5 TABLET ORAL DAILY
Refills: 0 | Status: DISCONTINUED | OUTPATIENT
Start: 2022-09-27 | End: 2022-09-29

## 2022-09-27 RX ORDER — FOLIC ACID 0.8 MG
1 TABLET ORAL DAILY
Refills: 0 | Status: DISCONTINUED | OUTPATIENT
Start: 2022-09-27 | End: 2022-09-30

## 2022-09-27 RX ADMIN — Medication 40 MILLIEQUIVALENT(S): at 14:51

## 2022-09-27 RX ADMIN — GABAPENTIN 100 MILLIGRAM(S): 400 CAPSULE ORAL at 23:29

## 2022-09-27 RX ADMIN — PANTOPRAZOLE SODIUM 40 MILLIGRAM(S): 20 TABLET, DELAYED RELEASE ORAL at 18:46

## 2022-09-27 RX ADMIN — CEFTRIAXONE 100 MILLIGRAM(S): 500 INJECTION, POWDER, FOR SOLUTION INTRAMUSCULAR; INTRAVENOUS at 14:58

## 2022-09-27 RX ADMIN — Medication 3 MILLILITER(S): at 14:23

## 2022-09-27 RX ADMIN — Medication 100 GRAM(S): at 08:00

## 2022-09-27 RX ADMIN — Medication 20 MILLIEQUIVALENT(S): at 18:47

## 2022-09-27 RX ADMIN — Medication 40 MILLIEQUIVALENT(S): at 07:50

## 2022-09-27 RX ADMIN — Medication 100 MICROGRAM(S): at 14:22

## 2022-09-27 RX ADMIN — Medication 3 MILLILITER(S): at 23:29

## 2022-09-27 RX ADMIN — Medication 1 PACKET(S): at 18:47

## 2022-09-27 RX ADMIN — Medication 3 MILLILITER(S): at 00:21

## 2022-09-27 RX ADMIN — Medication 3 MILLILITER(S): at 18:47

## 2022-09-27 RX ADMIN — Medication 1 MILLIGRAM(S): at 14:58

## 2022-09-27 RX ADMIN — Medication 3 MILLILITER(S): at 05:38

## 2022-09-27 RX ADMIN — GABAPENTIN 100 MILLIGRAM(S): 400 CAPSULE ORAL at 14:22

## 2022-09-27 RX ADMIN — PANTOPRAZOLE SODIUM 40 MILLIGRAM(S): 20 TABLET, DELAYED RELEASE ORAL at 05:38

## 2022-09-27 NOTE — PROGRESS NOTE ADULT - SUBJECTIVE AND OBJECTIVE BOX
24 hr events:    SUBJECTIVE:      MEDICATIONS  (STANDING):  albuterol/ipratropium for Nebulization 3 milliLiter(s) Nebulizer every 6 hours  dextrose 50% Injectable 25 Gram(s) IV Push once  dextrose 50% Injectable 12.5 Gram(s) IV Push once  dextrose 50% Injectable 25 Gram(s) IV Push once  glucagon  Injectable 1 milliGRAM(s) IntraMuscular once  influenza  Vaccine (HIGH DOSE) 0.7 milliLiter(s) IntraMuscular once  insulin lispro (ADMELOG) corrective regimen sliding scale   SubCutaneous three times a day before meals  insulin lispro (ADMELOG) corrective regimen sliding scale   SubCutaneous at bedtime  levothyroxine Injectable 80 MICROGram(s) IV Push at bedtime  pantoprazole  Injectable 40 milliGRAM(s) IV Push two times a day  potassium chloride   Powder 40 milliEquivalent(s) Oral once  potassium chloride   Powder 40 milliEquivalent(s) Oral every 2 hours    MEDICATIONS  (PRN):  dextrose Oral Gel 15 Gram(s) Oral once PRN Blood Glucose LESS THAN 70 milliGRAM(s)/deciliter  ondansetron Injectable 4 milliGRAM(s) IV Push every 6 hours PRN Nausea and/or Vomiting      Baird:	  [ ] None	[ ] Daily Baird Order Placed	   Indication:	  [ ] Strict I and O's    [ ] Obstruction     [ ] Incontinence + Stage 3 or 4 Decubitus  Central Line:  [ ] None	   [ ]  Medication / TPN Administration     [ ] No Peripheral IV     ICU Vital Signs Last 24 Hrs  T(C): 37.1 (27 Sep 2022 09:00), Max: 37.3 (26 Sep 2022 11:00)  T(F): 98.7 (27 Sep 2022 09:00), Max: 99.2 (26 Sep 2022 11:00)  HR: 78 (27 Sep 2022 09:00) (73 - 101)  BP: 162/77 (27 Sep 2022 09:00) (107/57 - 172/116)  BP(mean): 111 (27 Sep 2022 09:00) (77 - 138)  ABP: --  ABP(mean): --  RR: 21 (27 Sep 2022 09:00) (15 - 37)  SpO2: 98% (27 Sep 2022 09:00) (93% - 100%)    O2 Parameters below as of 27 Sep 2022 09:00  Patient On (Oxygen Delivery Method): room air            Physical Exam:  General: NAD  HEENT: NC/AT, EOMI, PERRLA, normal hearing, no oral lesions, neck supple w/o LAD  Pulmonary: Nonlabored breathing, no respiratory distress, CTA-B  Cardiovascular: NSR, no murmurs  Abdominal: soft, NT/ND, +BS, no organomegaly  Extremities: WWP, 5/5 strength x 4, no clubbing/cyanosis/edema  Neuro: A/O x3, CNs II-XII grossly intact, normal motor/sensation, no focal deficits  Pulses: palpable distal pulses    I&O's Summary    26 Sep 2022 07:01  -  27 Sep 2022 07:00  --------------------------------------------------------  IN: 1030 mL / OUT: 2835 mL / NET: -1805 mL    27 Sep 2022 07:01  -  27 Sep 2022 10:07  --------------------------------------------------------  IN: 100 mL / OUT: 0 mL / NET: 100 mL        LABS:                        9.1    5.82  )-----------( 104      ( 27 Sep 2022 05:39 )             26.2     09-27    141  |  109<H>  |  4<L>  ----------------------------<  76  2.9<LL>   |  23  |  0.61    Ca    7.6<L>      27 Sep 2022 05:39  Phos  3.1     09-27  Mg     1.9     09-27          CAPILLARY BLOOD GLUCOSE      POCT Blood Glucose.: 75 mg/dL (27 Sep 2022 07:33)  POCT Blood Glucose.: 87 mg/dL (26 Sep 2022 21:55)  POCT Blood Glucose.: 83 mg/dL (26 Sep 2022 16:26)     24 hr events:  ON: Post Tx Hg 8.4, good UOP  9/26: UA + but UCx 50k GBS- dc'ed Rocephin; 9AM Hgb 8.4; Cscope minimal distal blood w/o clots and no proximal blood, 4pm Hgb 7.8- 1u RBC; CLD, NPO@MN    SUBJECTIVE:  NAEON. Denies bloody BMs. Endorses mild abdominal soreness after Cscope. Endorses mild dysuria, freq, and suprapubic tenderness. Denies cp/sob. Tolerating diet.    MEDICATIONS  (STANDING):  albuterol/ipratropium for Nebulization 3 milliLiter(s) Nebulizer every 6 hours  dextrose 50% Injectable 25 Gram(s) IV Push once  dextrose 50% Injectable 12.5 Gram(s) IV Push once  dextrose 50% Injectable 25 Gram(s) IV Push once  glucagon  Injectable 1 milliGRAM(s) IntraMuscular once  influenza  Vaccine (HIGH DOSE) 0.7 milliLiter(s) IntraMuscular once  insulin lispro (ADMELOG) corrective regimen sliding scale   SubCutaneous three times a day before meals  insulin lispro (ADMELOG) corrective regimen sliding scale   SubCutaneous at bedtime  levothyroxine Injectable 80 MICROGram(s) IV Push at bedtime  pantoprazole  Injectable 40 milliGRAM(s) IV Push two times a day  potassium chloride   Powder 40 milliEquivalent(s) Oral once  potassium chloride   Powder 40 milliEquivalent(s) Oral every 2 hours    MEDICATIONS  (PRN):  dextrose Oral Gel 15 Gram(s) Oral once PRN Blood Glucose LESS THAN 70 milliGRAM(s)/deciliter  ondansetron Injectable 4 milliGRAM(s) IV Push every 6 hours PRN Nausea and/or Vomiting    ICU Vital Signs Last 24 Hrs  T(C): 37.1 (27 Sep 2022 09:00), Max: 37.3 (26 Sep 2022 11:00)  T(F): 98.7 (27 Sep 2022 09:00), Max: 99.2 (26 Sep 2022 11:00)  HR: 78 (27 Sep 2022 09:00) (73 - 101)  BP: 162/77 (27 Sep 2022 09:00) (107/57 - 172/116)  BP(mean): 111 (27 Sep 2022 09:00) (77 - 138)  ABP: --  ABP(mean): --  RR: 21 (27 Sep 2022 09:00) (15 - 37)  SpO2: 98% (27 Sep 2022 09:00) (93% - 100%)    O2 Parameters below as of 27 Sep 2022 09:00  Patient On (Oxygen Delivery Method): room air            Physical Exam:  General: NAD  HEENT: NC/AT, EOMI, normal hearing, no oral lesions, neck supple w/o LAD  Pulmonary: Nonlabored breathing, no respiratory distress, CTA-B  Cardiovascular: NSR, no murmurs  Abdominal: soft, NT/ND, +BS, no organomegaly  Extremities: WWP, 5/5 strength x 4, no clubbing/cyanosis/edema  Neuro: A/O x3, CNs II-XII grossly intact, normal motor/sensation, no focal deficits  Pulses: palpable distal pulses    I&O's Summary    26 Sep 2022 07:01  -  27 Sep 2022 07:00  --------------------------------------------------------  IN: 1030 mL / OUT: 2835 mL / NET: -1805 mL    27 Sep 2022 07:01  -  27 Sep 2022 10:07  --------------------------------------------------------  IN: 100 mL / OUT: 0 mL / NET: 100 mL        LABS:                        9.1    5.82  )-----------( 104      ( 27 Sep 2022 05:39 )             26.2     09-27    141  |  109<H>  |  4<L>  ----------------------------<  76  2.9<LL>   |  23  |  0.61    Ca    7.6<L>      27 Sep 2022 05:39  Phos  3.1     09-27  Mg     1.9     09-27          CAPILLARY BLOOD GLUCOSE      POCT Blood Glucose.: 75 mg/dL (27 Sep 2022 07:33)  POCT Blood Glucose.: 87 mg/dL (26 Sep 2022 21:55)  POCT Blood Glucose.: 83 mg/dL (26 Sep 2022 16:26)

## 2022-09-27 NOTE — PROGRESS NOTE ADULT - SUBJECTIVE AND OBJECTIVE BOX
GASTROENTEROLOGY PROGRESS NOTE  Patient seen and examined at bedside. Stepped down to 8LA. Normal BM reported yday none today. Hb is stable    PERTINENT REVIEW OF SYSTEMS:  As noted above    Allergies    Fosamax (Angioedema)  lisinopril (Angioedema)  sulfa drugs (Hives)  tetanus immune globulin (Hives)    Intolerances    statins (Unknown)    MEDICATIONS:  MEDICATIONS  (STANDING):  albuterol/ipratropium for Nebulization 3 milliLiter(s) Nebulizer every 6 hours  allopurinol 100 milliGRAM(s) Oral daily  amLODIPine   Tablet 5 milliGRAM(s) Oral daily  dextrose 50% Injectable 25 Gram(s) IV Push once  dextrose 50% Injectable 12.5 Gram(s) IV Push once  dextrose 50% Injectable 25 Gram(s) IV Push once  folic acid 1 milliGRAM(s) Oral daily  gabapentin 100 milliGRAM(s) Oral three times a day  glucagon  Injectable 1 milliGRAM(s) IntraMuscular once  influenza  Vaccine (HIGH DOSE) 0.7 milliLiter(s) IntraMuscular once  insulin lispro (ADMELOG) corrective regimen sliding scale   SubCutaneous at bedtime  levothyroxine 100 MICROGram(s) Oral daily  nitrofurantoin monohydrate/macrocrystals (MACROBID) 100 milliGRAM(s) Oral two times a day  pantoprazole  Injectable 40 milliGRAM(s) IV Push two times a day  potassium chloride   Powder 40 milliEquivalent(s) Oral once    MEDICATIONS  (PRN):  dextrose Oral Gel 15 Gram(s) Oral once PRN Blood Glucose LESS THAN 70 milliGRAM(s)/deciliter  ondansetron Injectable 4 milliGRAM(s) IV Push every 6 hours PRN Nausea and/or Vomiting    Vital Signs Last 24 Hrs  T(C): 37.1 (27 Sep 2022 12:00), Max: 37.3 (26 Sep 2022 18:15)  T(F): 98.7 (27 Sep 2022 12:00), Max: 99.1 (26 Sep 2022 18:15)  HR: 80 (27 Sep 2022 10:55) (73 - 101)  BP: 164/79 (27 Sep 2022 10:55) (107/57 - 172/116)  BP(mean): 114 (27 Sep 2022 10:55) (77 - 138)  RR: 19 (27 Sep 2022 10:55) (15 - 37)  SpO2: 99% (27 Sep 2022 10:55) (93% - 100%)    Parameters below as of 27 Sep 2022 11:00  Patient On (Oxygen Delivery Method): room air        09-26 @ 07:01  -  09-27 @ 07:00  --------------------------------------------------------  IN: 1030 mL / OUT: 2835 mL / NET: -1805 mL    09-27 @ 07:01  -  09-27 @ 15:14  --------------------------------------------------------  IN: 580 mL / OUT: 700 mL / NET: -120 mL      PHYSICAL EXAM:    General: Well developed; in no acute distress  HEENT: MMM, conjunctiva and sclera clear  Gastrointestinal: Soft non-tender non-distended; No rebound or guarding  Skin: Warm and dry. No obvious rash    LABS:                        10.1   6.76  )-----------( 120      ( 27 Sep 2022 12:00 )             30.0     09-27    141  |  109<H>  |  4<L>  ----------------------------<  76  2.9<LL>   |  23  |  0.61    Ca    7.6<L>      27 Sep 2022 05:39  Phos  3.1     09-27  Mg     1.9     09-27                        RADIOLOGY & ADDITIONAL STUDIES:  Reviewed

## 2022-09-27 NOTE — CONSULT NOTE ADULT - ATTENDING COMMENTS
89F h/o DM, CAD s/p cath iin 5/2022, moderate AS, HFpEF, HLD, GIB, diverticulosis , PMR on MTX/prednisone, COPD p/w hematochezia on 9/24.  Had mild dizziness after.  Also reported dysuria, mild suprapubic pain starting a day prior as well.  Denied fever/chills, back pain, n/v/d.  Upon admission, she was found to have Hgb 8.8 from 11, then dropped further to 4.6 the next day.  UA was also positive and started on CTX for cystitis.   Received RBC transfusion, seen by GI, fund to have diverticulosis w/o active bleeding on 9/26.  Her bleeding stopped and Hgb stabilized.  Her dysuria improving on CTX.  UCx grew 50k GAS and 50k Hafnia alvei. 89F h/o DM, CAD s/p cath iin 5/2022, moderate AS, HFpEF, HLD, GIB, diverticulosis , PMR on MTX/prednisone, COPD p/w hematochezia on 9/24.  Had mild dizziness after.  Also reported dysuria, mild suprapubic pain starting a day prior as well.  Denied fever/chills, back pain, n/v/d.  Upon admission, she was found to have Hgb 8.8 from 11, then dropped further to 4.6 the next day.  UA was also positive and started on CTX for cystitis.   Received RBC transfusion, seen by GI, fund to have diverticulosis w/o active bleeding on 9/26.  Her bleeding stopped and Hgb stabilized.  Her dysuria improving on CTX.  UCx grew 50k GAS (S to pen) and 50k Hafnia alvei (R to CTX, S to nitrofurantoin).  Finish CTX today (9/25-9/27) to treat GAS cystitis.  Start macrobid 100mg PO q12h x 5 days to treat Hafnia alvei cystitis.    Thank you for your consult.  Please re-consult us or call us with questions.  Case d/w primary team.    Radha Amato MD, MS  Infectious Disease attending  work cell [phone]  For any questions during evening/weekend/holiday, please page ID on call

## 2022-09-27 NOTE — PROGRESS NOTE ADULT - SUBJECTIVE AND OBJECTIVE BOX
INTERVAL EVENTS:  -No bleed visualized on colonoscopy - ok to start plavix per GI    PAST MEDICAL & SURGICAL HISTORY:  HTN (hypertension)    COPD (chronic obstructive pulmonary disease)    Arthritis    Polymyalgia rheumatica    Hyperlipidemia    Chronic diastolic heart failure    Coronary artery disease    Diabetes    Moderate aortic stenosis    H/O bilateral oophorectomy    H/O total hysterectomy        MEDICATIONS  (STANDING):  albuterol/ipratropium for Nebulization 3 milliLiter(s) Nebulizer every 6 hours  allopurinol 100 milliGRAM(s) Oral daily  amLODIPine   Tablet 5 milliGRAM(s) Oral daily  dextrose 50% Injectable 25 Gram(s) IV Push once  dextrose 50% Injectable 12.5 Gram(s) IV Push once  dextrose 50% Injectable 25 Gram(s) IV Push once  folic acid 1 milliGRAM(s) Oral daily  gabapentin 100 milliGRAM(s) Oral three times a day  glucagon  Injectable 1 milliGRAM(s) IntraMuscular once  influenza  Vaccine (HIGH DOSE) 0.7 milliLiter(s) IntraMuscular once  insulin lispro (ADMELOG) corrective regimen sliding scale   SubCutaneous at bedtime  levothyroxine 100 MICROGram(s) Oral daily  nitrofurantoin monohydrate/macrocrystals (MACROBID) 100 milliGRAM(s) Oral two times a day  pantoprazole  Injectable 40 milliGRAM(s) IV Push two times a day  potassium chloride    Tablet ER 20 milliEquivalent(s) Oral once  potassium chloride   Powder 40 milliEquivalent(s) Oral once  potassium phosphate / sodium phosphate Powder (PHOS-NaK) 1 Packet(s) Oral once    MEDICATIONS  (PRN):  dextrose Oral Gel 15 Gram(s) Oral once PRN Blood Glucose LESS THAN 70 milliGRAM(s)/deciliter  ondansetron Injectable 4 milliGRAM(s) IV Push every 6 hours PRN Nausea and/or Vomiting    T(F): 98.9 (09-27-22 @ 17:00), Max: 99 (09-27-22 @ 01:00)  HR: 82 (09-27-22 @ 15:20) (78 - 101)  BP: 163/76 (09-27-22 @ 15:20) (107/57 - 172/116)  BP(mean): 109 (09-27-22 @ 15:20) (77 - 138)  ABP: --  ABP(mean): --  RR: 18 (09-27-22 @ 15:20) (15 - 21)  SpO2: 97% (09-27-22 @ 15:20) (96% - 99%)    I/O Detail 24H    26 Sep 2022 07:01  -  27 Sep 2022 07:00  --------------------------------------------------------  IN:    IV PiggyBack: 250 mL    Lactated Ringers: 480 mL    PRBCs (Packed Red Blood Cells): 300 mL  Total IN: 1030 mL    OUT:    Voided (mL): 2835 mL  Total OUT: 2835 mL    Total NET: -1805 mL      27 Sep 2022 07:01  -  27 Sep 2022 18:47  --------------------------------------------------------  IN:    IV PiggyBack: 100 mL    Oral Fluid: 960 mL  Total IN: 1060 mL    OUT:    Voided (mL): 850 mL  Total OUT: 850 mL    Total NET: 210 mL          PHYSICAL EXAM:  GEN: NAD  HEENT: EOMI   RESP: CTA b/l  CV: RRR. Normal S1/S2. No m/r/g.  ABD: soft, non-distended  EXT: No edema   NEURO: alert and attentive    LABS:  CBC 09-27-22 @ 12:00                        10.1   6.76  )-----------( 120                   30.0       Hgb trend: 10.1 <-- , 9.1 <-- , 8.4 <-- , 7.8 <-- , 8.4 <-- , 8.1 <-- , 8.6 <-- , 7.9 <-- , 8.5 <-- , 7.4 <-- , 6.5 <-- , 4.6 <--   WBC trend: 6.76 <-- , 5.82 <-- , 6.01 <-- , 6.63 <-- , 7.80 <-- , 7.66 <-- , 8.05 <-- , 9.22 <-- , 9.79 <-- , 7.62 <-- , 5.50 <-- , 4.19 <--       CMP 09-27-22 @ 15:28    143  |  110<H>  |  5<L>  ----------------------------<  112<H>  3.6   |  23  |  0.74    Ca    8.0<L>      09-27-22 @ 15:28  Phos  2.8     09-27  Mg     2.1     09-27        Serum Cr trend: 0.74 <-- , 0.61 <-- , 0.70 <-- , 0.73 <-- , 0.72 <-- , 0.70 <--         Cardiac Markers           STUDIES:

## 2022-09-27 NOTE — PROGRESS NOTE ADULT - SUBJECTIVE AND OBJECTIVE BOX
Pt seen and examined at bedside this morning on 5Ea. 89F with CAD and recent stent on DAPT admitted to [facility] with concern for LGID, remains HD stable, has received 4u pRBCs thus far. Pt underwent bedside colonoscopy yesterday in the SICU. Old blood visualized in the rectosigmoid, no evidence of bleeding in proximal colon. Abdominal exam benign.   - Will advance diet today,   - can step down from SICU to monitored bed.   - Will continue to hold AP/AC for one additional day.   - If no recurrence of GI bleeding will consider restarting plavix under observation.

## 2022-09-28 LAB
ANION GAP SERPL CALC-SCNC: 7 MMOL/L — SIGNIFICANT CHANGE UP (ref 5–17)
BUN SERPL-MCNC: 7 MG/DL — SIGNIFICANT CHANGE UP (ref 7–23)
CALCIUM SERPL-MCNC: 7.9 MG/DL — LOW (ref 8.4–10.5)
CHLORIDE SERPL-SCNC: 111 MMOL/L — HIGH (ref 96–108)
CO2 SERPL-SCNC: 24 MMOL/L — SIGNIFICANT CHANGE UP (ref 22–31)
CREAT SERPL-MCNC: 0.76 MG/DL — SIGNIFICANT CHANGE UP (ref 0.5–1.3)
EGFR: 73 ML/MIN/1.73M2 — SIGNIFICANT CHANGE UP
GLUCOSE BLDC GLUCOMTR-MCNC: 107 MG/DL — HIGH (ref 70–99)
GLUCOSE BLDC GLUCOMTR-MCNC: 120 MG/DL — HIGH (ref 70–99)
GLUCOSE BLDC GLUCOMTR-MCNC: 97 MG/DL — SIGNIFICANT CHANGE UP (ref 70–99)
GLUCOSE SERPL-MCNC: 103 MG/DL — HIGH (ref 70–99)
HCT VFR BLD CALC: 27.7 % — LOW (ref 34.5–45)
HGB BLD-MCNC: 9 G/DL — LOW (ref 11.5–15.5)
MAGNESIUM SERPL-MCNC: 2 MG/DL — SIGNIFICANT CHANGE UP (ref 1.6–2.6)
MCHC RBC-ENTMCNC: 30.7 PG — SIGNIFICANT CHANGE UP (ref 27–34)
MCHC RBC-ENTMCNC: 32.5 GM/DL — SIGNIFICANT CHANGE UP (ref 32–36)
MCV RBC AUTO: 94.5 FL — SIGNIFICANT CHANGE UP (ref 80–100)
NRBC # BLD: 0 /100 WBCS — SIGNIFICANT CHANGE UP (ref 0–0)
PHOSPHATE SERPL-MCNC: 3.4 MG/DL — SIGNIFICANT CHANGE UP (ref 2.5–4.5)
PLATELET # BLD AUTO: 105 K/UL — LOW (ref 150–400)
POTASSIUM SERPL-MCNC: 4 MMOL/L — SIGNIFICANT CHANGE UP (ref 3.5–5.3)
POTASSIUM SERPL-SCNC: 4 MMOL/L — SIGNIFICANT CHANGE UP (ref 3.5–5.3)
RBC # BLD: 2.93 M/UL — LOW (ref 3.8–5.2)
RBC # FLD: 15.5 % — HIGH (ref 10.3–14.5)
SODIUM SERPL-SCNC: 142 MMOL/L — SIGNIFICANT CHANGE UP (ref 135–145)
WBC # BLD: 5.89 K/UL — SIGNIFICANT CHANGE UP (ref 3.8–10.5)
WBC # FLD AUTO: 5.89 K/UL — SIGNIFICANT CHANGE UP (ref 3.8–10.5)

## 2022-09-28 PROCEDURE — 99223 1ST HOSP IP/OBS HIGH 75: CPT

## 2022-09-28 PROCEDURE — 99232 SBSQ HOSP IP/OBS MODERATE 35: CPT | Mod: GC

## 2022-09-28 RX ORDER — HEPARIN SODIUM 5000 [USP'U]/ML
5000 INJECTION INTRAVENOUS; SUBCUTANEOUS EVERY 8 HOURS
Refills: 0 | Status: DISCONTINUED | OUTPATIENT
Start: 2022-09-28 | End: 2022-09-28

## 2022-09-28 RX ORDER — CLOPIDOGREL BISULFATE 75 MG/1
75 TABLET, FILM COATED ORAL DAILY
Refills: 0 | Status: DISCONTINUED | OUTPATIENT
Start: 2022-09-28 | End: 2022-09-30

## 2022-09-28 RX ORDER — SODIUM CHLORIDE 9 MG/ML
500 INJECTION, SOLUTION INTRAVENOUS ONCE
Refills: 0 | Status: COMPLETED | OUTPATIENT
Start: 2022-09-28 | End: 2022-09-28

## 2022-09-28 RX ORDER — ACETAMINOPHEN 500 MG
650 TABLET ORAL EVERY 6 HOURS
Refills: 0 | Status: DISCONTINUED | OUTPATIENT
Start: 2022-09-28 | End: 2022-09-30

## 2022-09-28 RX ADMIN — Medication 650 MILLIGRAM(S): at 11:11

## 2022-09-28 RX ADMIN — Medication 100 MICROGRAM(S): at 06:56

## 2022-09-28 RX ADMIN — Medication 3 MILLILITER(S): at 18:28

## 2022-09-28 RX ADMIN — GABAPENTIN 100 MILLIGRAM(S): 400 CAPSULE ORAL at 22:20

## 2022-09-28 RX ADMIN — Medication 100 MILLIGRAM(S): at 18:28

## 2022-09-28 RX ADMIN — PANTOPRAZOLE SODIUM 40 MILLIGRAM(S): 20 TABLET, DELAYED RELEASE ORAL at 18:25

## 2022-09-28 RX ADMIN — Medication 100 MILLIGRAM(S): at 11:36

## 2022-09-28 RX ADMIN — AMLODIPINE BESYLATE 5 MILLIGRAM(S): 2.5 TABLET ORAL at 06:57

## 2022-09-28 RX ADMIN — Medication 3 MILLILITER(S): at 11:37

## 2022-09-28 RX ADMIN — GABAPENTIN 100 MILLIGRAM(S): 400 CAPSULE ORAL at 15:35

## 2022-09-28 RX ADMIN — GABAPENTIN 100 MILLIGRAM(S): 400 CAPSULE ORAL at 06:56

## 2022-09-28 RX ADMIN — CLOPIDOGREL BISULFATE 75 MILLIGRAM(S): 75 TABLET, FILM COATED ORAL at 10:25

## 2022-09-28 RX ADMIN — Medication 650 MILLIGRAM(S): at 10:33

## 2022-09-28 RX ADMIN — Medication 100 MILLIGRAM(S): at 06:58

## 2022-09-28 RX ADMIN — SODIUM CHLORIDE 500 MILLILITER(S): 9 INJECTION, SOLUTION INTRAVENOUS at 10:07

## 2022-09-28 RX ADMIN — Medication 1 MILLIGRAM(S): at 11:35

## 2022-09-28 RX ADMIN — PANTOPRAZOLE SODIUM 40 MILLIGRAM(S): 20 TABLET, DELAYED RELEASE ORAL at 06:58

## 2022-09-28 RX ADMIN — Medication 3 MILLILITER(S): at 06:56

## 2022-09-28 NOTE — PROGRESS NOTE ADULT - SUBJECTIVE AND OBJECTIVE BOX
SUBJECTIVE: Feeling ok, no pain, arian diet, no BM, no blood per rectum. Patient seen and examined bedside by chief resident.    amLODIPine   Tablet 5 milliGRAM(s) Oral daily  nitrofurantoin monohydrate/macrocrystals (MACROBID) 100 milliGRAM(s) Oral two times a day    MEDICATIONS  (PRN):  dextrose Oral Gel 15 Gram(s) Oral once PRN Blood Glucose LESS THAN 70 milliGRAM(s)/deciliter  ondansetron Injectable 4 milliGRAM(s) IV Push every 6 hours PRN Nausea and/or Vomiting      I&O's Detail    27 Sep 2022 07:01  -  28 Sep 2022 07:00  --------------------------------------------------------  IN:    IV PiggyBack: 100 mL    Oral Fluid: 1200 mL  Total IN: 1300 mL    OUT:    Voided (mL): 1250 mL  Total OUT: 1250 mL    Total NET: 50 mL          Vital Signs Last 24 Hrs  T(C): 37.3 (28 Sep 2022 04:58), Max: 37.6 (27 Sep 2022 21:39)  T(F): 99.1 (28 Sep 2022 04:58), Max: 99.6 (27 Sep 2022 21:39)  HR: 86 (28 Sep 2022 04:43) (78 - 95)  BP: 129/71 (28 Sep 2022 04:43) (129/71 - 165/84)  BP(mean): 95 (28 Sep 2022 04:43) (95 - 121)  RR: 18 (28 Sep 2022 04:43) (18 - 21)  SpO2: 98% (28 Sep 2022 04:43) (97% - 99%)    Parameters below as of 28 Sep 2022 04:43  Patient On (Oxygen Delivery Method): room air        General: NAD, resting comfortably in bed  C/V: NSR  Pulm: Nonlabored breathing, no respiratory distress  Abd: soft, nondistended, minimal LLQ TTP, no rebound/guarding  Extrem:  SCDs in place    LABS:                        9.0    5.89  )-----------( 105      ( 28 Sep 2022 06:26 )             27.7     09-28    142  |  111<H>  |  7   ----------------------------<  103<H>  4.0   |  24  |  0.76    Ca    7.9<L>      28 Sep 2022 06:26  Phos  3.4     09-28  Mg     2.0     09-28

## 2022-09-28 NOTE — PHYSICAL THERAPY INITIAL EVALUATION ADULT - PERTINENT HX OF CURRENT PROBLEM, REHAB EVAL
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 Presented to [facility] today for bloody bowel movements since this morning. Please refer to H&P on Muse for remaining.

## 2022-09-28 NOTE — PHYSICAL THERAPY INITIAL EVALUATION ADULT - ADDITIONAL COMMENTS
Patent reports previously independent with ambulation and use of rollator or straight cane as needed. Patient has [facility] 4 hrs/day Monday through Friday to assist with ADLs/IADLs as needed (grandson assists on the weekends but usually at work during the days). Patient denies history of mechanical falls prior to admission.

## 2022-09-28 NOTE — PHYSICAL THERAPY INITIAL EVALUATION ADULT - THERAPY FREQUENCY, PT EVAL
Patient educated on frequency of inpatient physical therapy at [facility], patient verbalized understanding./3-5x/week

## 2022-09-28 NOTE — PROGRESS NOTE ADULT - SUBJECTIVE AND OBJECTIVE BOX
INTERVAL EVENTS:  -Plavix not given until today AM  -Endorses abdominal pain today    PAST MEDICAL & SURGICAL HISTORY:  HTN (hypertension)    COPD (chronic obstructive pulmonary disease)    Arthritis    Polymyalgia rheumatica    Hyperlipidemia    Chronic diastolic heart failure    Coronary artery disease    Diabetes    Moderate aortic stenosis    H/O bilateral oophorectomy    H/O total hysterectomy        MEDICATIONS  (STANDING):  albuterol/ipratropium for Nebulization 3 milliLiter(s) Nebulizer every 6 hours  allopurinol 100 milliGRAM(s) Oral daily  amLODIPine   Tablet 5 milliGRAM(s) Oral daily  clopidogrel Tablet 75 milliGRAM(s) Oral daily  dextrose 50% Injectable 25 Gram(s) IV Push once  dextrose 50% Injectable 12.5 Gram(s) IV Push once  dextrose 50% Injectable 25 Gram(s) IV Push once  folic acid 1 milliGRAM(s) Oral daily  gabapentin 100 milliGRAM(s) Oral three times a day  glucagon  Injectable 1 milliGRAM(s) IntraMuscular once  influenza  Vaccine (HIGH DOSE) 0.7 milliLiter(s) IntraMuscular once  insulin lispro (ADMELOG) corrective regimen sliding scale   SubCutaneous at bedtime  levothyroxine 100 MICROGram(s) Oral daily  nitrofurantoin monohydrate/macrocrystals (MACROBID) 100 milliGRAM(s) Oral two times a day  pantoprazole  Injectable 40 milliGRAM(s) IV Push two times a day  potassium chloride    Tablet ER 20 milliEquivalent(s) Oral once  potassium chloride   Powder 40 milliEquivalent(s) Oral once    MEDICATIONS  (PRN):  acetaminophen     Tablet .. 650 milliGRAM(s) Oral every 6 hours PRN Mild Pain (1 - 3), Moderate Pain (4 - 6)  dextrose Oral Gel 15 Gram(s) Oral once PRN Blood Glucose LESS THAN 70 milliGRAM(s)/deciliter  ondansetron Injectable 4 milliGRAM(s) IV Push every 6 hours PRN Nausea and/or Vomiting    T(F): 99 (09-28-22 @ 14:00), Max: 99.6 (09-27-22 @ 21:39)  HR: 86 (09-28-22 @ 12:30) (76 - 86)  BP: 133/76 (09-28-22 @ 12:30) (128/67 - 157/76)  BP(mean): 99 (09-28-22 @ 12:30) (92 - 109)  ABP: --  ABP(mean): --  RR: 18 (09-28-22 @ 12:30) (18 - 18)  SpO2: 99% (09-28-22 @ 12:30) (98% - 99%)    I/O Detail 24H    27 Sep 2022 07:01  -  28 Sep 2022 07:00  --------------------------------------------------------  IN:    IV PiggyBack: 100 mL    Oral Fluid: 1200 mL  Total IN: 1300 mL    OUT:    Voided (mL): 1250 mL  Total OUT: 1250 mL    Total NET: 50 mL      28 Sep 2022 07:01  -  28 Sep 2022 16:14  --------------------------------------------------------  IN:    Lactated Ringers Bolus: 500 mL    Oral Fluid: 720 mL  Total IN: 1220 mL    OUT:    Voided (mL): 650 mL  Total OUT: 650 mL    Total NET: 570 mL          PHYSICAL EXAM:  GEN: NAD  HEENT: EOMI   RESP: CTA b/l  CV: RRR. Normal S1/S2. No m/r/g.  ABD: soft, non-distended  EXT: No edema   NEURO: alert and attentive    LABS:  CBC 09-28-22 @ 06:26                        9.0    5.89  )-----------( 105                   27.7       Hgb trend: 9.0 <-- , 10.1 <-- , 9.1 <-- , 8.4 <-- , 7.8 <-- , 8.4 <-- , 8.1 <-- , 8.6 <--   WBC trend: 5.89 <-- , 6.76 <-- , 5.82 <-- , 6.01 <-- , 6.63 <-- , 7.80 <-- , 7.66 <-- , 8.05 <--       CMP 09-28-22 @ 06:26    142  |  111<H>  |  7   ----------------------------<  103<H>  4.0   |  24  |  0.76    Ca    7.9<L>      09-28-22 @ 06:26  Phos  3.4     09-28  Mg     2.0     09-28        Serum Cr trend: 0.76 <-- , 0.74 <-- , 0.61 <-- , 0.70 <-- , 0.73 <--         Cardiac Markers           STUDIES:

## 2022-09-28 NOTE — PHYSICAL THERAPY INITIAL EVALUATION ADULT - GAIT DEVIATIONS NOTED, PT EVAL
fairly steady gait, no LOB although 1 episode of knee buckling 2/2 patient reporting "my left knee feels out of the socket like it does at home sometimes"; patient attempted (L)knee flexion/extension in standing although unable to adjust (therapist deferred further gait training at this time)/decreased shiva/decreased velocity of limb motion/decreased step length/decreased weight-shifting ability

## 2022-09-28 NOTE — CONSULT NOTE ADULT - ASSESSMENT
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 Presented to [facility] for bloody bowel movements 9/24.  In the ED, pt afebrile, HDS, nontoxic appearing S/p multiple episodes of blood per rectum and repeat hb of 6.5 from 8.8. Transferred to the SICU in the setting of presumed LGIB, 3u pRBC (9/25, 2x 9/24), 1u RBC (9/26) scope w/o clot or evidence of active bleeding. transferred to [facility] for continued care.     #Lower GI Bleed likely diverticular   #acute blood loss anemia   s/p 4units total PRBC, last transfusion 9/26  scoped on 9/26 by GI which showed: Diverticulosis of the whole colon but mostly concentrated on the sigmoid colon. No active bleeding was noted however due to severe frothing the visualization was limited for any smaller colonic lesions.  Hgb from time of admission: 4.6  Hemoglobin: 9 today 9/28, no further bm   per GI is ok to resume plavix  Prior evaluation: EGD (6/9/21): non-erosive gastritis, single polyp in fundus, possible GIST. Colonoscopy (6/9/21): severe pan-diverticulosis   monitor for any further bleeding   cw PPI   diet advanced   continue to trend daily CBC, keep active type and screen   GI following       #CAD s/p cath and CHETAN in 5/2022   per GI is ok to resume plavix    #hypothyroidism   continue with home medications    #HLD   would recommend to start atorvastatin 40 qd if no other contraindication     #thrombocytopenia   plt 121 on admission to [facility]  continue to monitor with qd cbc   transfus if plt <50k and pt is actively bleeding or ppx plt transfusion if plt drop below 10 k     #HTN   continue with amlodipine   hold if bp <110     #UTI  ua with > 10 wbc   symptoms resolved   on macrobid for 10 days       #uncontrolled dm   continue with HSS    #HFpEF:   continue with DASH diet and rec to restrict fluid intake to 1500ml qd  caution with IVF administration     #dvt ppx with scd

## 2022-09-28 NOTE — PHYSICAL THERAPY INITIAL EVALUATION ADULT - GENERAL OBSERVATIONS, REHAB EVAL
PT IE completed. Chart reviewed. Patient without complaints of pain at rest (pre-medicated and remained agreeable to PT). Patient received semi-supine, NAD, +tele, +(L)IV hep lock, RN PT IE completed. Chart reviewed. Patient without complaints of pain at rest (pre-medicated and remained agreeable to PT). Patient received semi-supine, NAD, +tele, +(L)IV hep lock, MIREYA Moscoso cleared patient for treatment.

## 2022-09-28 NOTE — CONSULT NOTE ADULT - SUBJECTIVE AND OBJECTIVE BOX
Patient is a 89y old  Female who presents with a chief complaint of Bloody stools (28 Sep 2022 07:46)      HPI:  HPI: 89 y/o F with PMHx HTN, HLD, DM, CAD s/p cath in May 2022, moderate AS, HFpEF (EF 65% May 2022), hx GI bleed (2021 @ 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 Presented to [facility] today for bloody bowel movements since this morning. Patient accompanied with grandson who contributed to history. Patient reported that this morning she woke up and went to the bathroom and noted black and bright red mixed stools, this is the first time that this happens since prior admission on 2021. She indicated that she had 2 additional BM described as the one mentioned before. She mentioned feeling lightheaded after the episodes, but didn't fall or LOC. She denied any other symptoms like abdominal pain, nausea, vomit, diarrhea, constipation. After being discharged from prior GI bleed admission, patient followed up with GI doctor, but she didn't have any other workup because "she was too old to have a colonoscopy". Most recent admission on May 2022 for cardiac catheterization.    In the ED: Accompanied by grandson, resting comfortably in bed, patient HD stable, labs significant for Hb 8.8 from 11 (2022), and + UTI.     PMH: As above  PSHx: Hysterectomy, oophorectomy, cardiac catheterization May 2022  Meds: Losartan, aspirin, gabapentin, raloxifene, folic acid, allopurinol, furosemide, oxybutynin, ezetimibe, tizanidine, plavix  Allergies: ACEi, sulfa      PAST MEDICAL HISTORY:  HTN (hypertension)    COPD (chronic obstructive pulmonary disease)    Arthritis    Polymyalgia rheumatica    Hyperlipidemia    Chronic diastolic heart failure    Coronary artery disease    Diabetes    Moderate aortic stenosis        PAST SURGICAL HISTORY:  H/O bilateral oophorectomy    H/O total hysterectomy      ALLERGIES:  Fosamax (Angioedema)  lisinopril (Angioedema)  statins (Unknown)  sulfa drugs (Hives)  tetanus immune globulin (Hives)            Urinalysis Basic - ( 24 Sep 2022 11:56 )    Color: Yellow / Appearance: Clear / S.015 / pH: x  Gluc: x / Ketone: NEGATIVE  / Bili: Negative / Urobili: 0.2 E.U./dL   Blood: x / Protein: NEGATIVE mg/dL / Nitrite: NEGATIVE   Leuk Esterase: Trace / RBC: < 5 /HPF / WBC > 10 /HPF   Sq Epi: x / Non Sq Epi: 0-5 /HPF / Bacteria: Present /HPF        MICRO:     IMAGING:       (24 Sep 2022 13:54)      Allergies    Fosamax (Angioedema)  lisinopril (Angioedema)  sulfa drugs (Hives)  tetanus immune globulin (Hives)    Intolerances    statins (Unknown)      MEDICATIONS  (STANDING):  albuterol/ipratropium for Nebulization 3 milliLiter(s) Nebulizer every 6 hours  allopurinol 100 milliGRAM(s) Oral daily  amLODIPine   Tablet 5 milliGRAM(s) Oral daily  clopidogrel Tablet 75 milliGRAM(s) Oral daily  dextrose 50% Injectable 25 Gram(s) IV Push once  dextrose 50% Injectable 12.5 Gram(s) IV Push once  dextrose 50% Injectable 25 Gram(s) IV Push once  folic acid 1 milliGRAM(s) Oral daily  gabapentin 100 milliGRAM(s) Oral three times a day  glucagon  Injectable 1 milliGRAM(s) IntraMuscular once  influenza  Vaccine (HIGH DOSE) 0.7 milliLiter(s) IntraMuscular once  insulin lispro (ADMELOG) corrective regimen sliding scale   SubCutaneous at bedtime  levothyroxine 100 MICROGram(s) Oral daily  nitrofurantoin monohydrate/macrocrystals (MACROBID) 100 milliGRAM(s) Oral two times a day  pantoprazole  Injectable 40 milliGRAM(s) IV Push two times a day  potassium chloride    Tablet ER 20 milliEquivalent(s) Oral once  potassium chloride   Powder 40 milliEquivalent(s) Oral once    MEDICATIONS  (PRN):  acetaminophen     Tablet .. 650 milliGRAM(s) Oral every 6 hours PRN Mild Pain (1 - 3), Moderate Pain (4 - 6)  dextrose Oral Gel 15 Gram(s) Oral once PRN Blood Glucose LESS THAN 70 milliGRAM(s)/deciliter  ondansetron Injectable 4 milliGRAM(s) IV Push every 6 hours PRN Nausea and/or Vomiting      Daily     Daily     Drug Dosing Weight  Height (cm): 152.4 (24 Sep 2022 11:16)  Weight (kg): 51.7 (24 Sep 2022 11:16)  BMI (kg/m2): 22.3 (24 Sep 2022 11:16)  BSA (m2): 1.47 (24 Sep 2022 11:16)    PAST MEDICAL & SURGICAL HISTORY:  HTN (hypertension)      COPD (chronic obstructive pulmonary disease)      Arthritis      Polymyalgia rheumatica      Hyperlipidemia      Chronic diastolic heart failure      Coronary artery disease      Diabetes      H/O bilateral oophorectomy      H/O total hysterectomy          FAMILY HISTORY:        REVIEW OF SYSTEMS:    CONSTITUTIONAL: No fever, weight loss, or fatigue  EYES: No eye pain, visual disturbances, or discharge  ENMT:  No difficulty hearing, tinnitus, vertigo; No sinus or throat pain  NECK: No pain or stiffness  RESPIRATORY: No cough, wheezing, chills or hemoptysis; No shortness of breath  CARDIOVASCULAR: No chest pain, palpitations, dizziness, or leg swelling  GASTROINTESTINAL: abdominal pain, no new BM  GENITOURINARY: No dysuria, frequency, hematuria, or incontinence  LYMPH NODES: No enlarged glands  ENDOCRINE: No heat or cold intolerance; No hair loss  MUSCULOSKELETAL: No joint pain or swelling; No muscle, back, or extremity pain  NEUROLOGICAL: No headaches, memory loss, loss of strength, numbness, or tremors  SKIN: No itching, burning, rashes, or lesion             Vital Signs Last 24 Hrs  T(C): 37.3 (28 Sep 2022 09:00), Max: 37.6 (27 Sep 2022 21:39)  T(F): 99.1 (28 Sep 2022 09:00), Max: 99.6 (27 Sep 2022 21:39)  HR: 86 (28 Sep 2022 04:43) (82 - 86)  BP: 129/71 (28 Sep 2022 04:43) (129/71 - 163/76)  BP(mean): 95 (28 Sep 2022 04:43) (95 - 109)  ABP: --  ABP(mean): --  RR: 18 (28 Sep 2022 04:43) (18 - 18)  SpO2: 98% (28 Sep 2022 04:43) (97% - 98%)    O2 Parameters below as of 28 Sep 2022 08:00  Patient On (Oxygen Delivery Method): room air                I&O's Detail    27 Sep 2022 07:01  -  28 Sep 2022 07:00  --------------------------------------------------------  IN:    IV PiggyBack: 100 mL    Oral Fluid: 1200 mL  Total IN: 1300 mL    OUT:    Voided (mL): 1250 mL  Total OUT: 1250 mL    Total NET: 50 mL      28 Sep 2022 07:01  -  28 Sep 2022 11:19  --------------------------------------------------------  IN:    Lactated Ringers Bolus: 500 mL    Oral Fluid: 480 mL  Total IN: 980 mL    OUT:    Voided (mL): 300 mL  Total OUT: 300 mL    Total NET: 680 mL          PHYSICAL EXAM:    GENERAL: NAD, well-groomed, well-developed  HEAD:  Atraumatic, Normocephalic  EYES: EOMI, PERRLA, conjunctiva and sclera clear  ENMT: No tonsillar erythema, exudates, or enlargement; Moist mucous membranes, Good dentition, No lesions  NECK: Supple, No JVD, Normal thyroid  NERVOUS SYSTEM:  Alert & Oriented X3, Good concentration; Motor Strength 5/5 B/L upper and lower extremities; DTRs 2+ intact and symmetric  CHEST/LUNG: Clear to percussion bilaterally; No rales, rhonchi, wheezing, or rubs  HEART: Regular rate and rhythm; No murmurs, rubs, or gallops  ABDOMEN: Soft, Nontender, Nondistended; Bowel sounds present  EXTREMITIES:  2+ Peripheral Pulses, No clubbing, cyanosis, or edema  LYMPH: No lymphadenopathy noted  SKIN: No rashes or lesions    LABS:  CBC Full  -  ( 28 Sep 2022 06:26 )  WBC Count : 5.89 K/uL  RBC Count : 2.93 M/uL  Hemoglobin : 9.0 g/dL  Hematocrit : 27.7 %  Platelet Count - Automated : 105 K/uL  Mean Cell Volume : 94.5 fl  Mean Cell Hemoglobin : 30.7 pg  Mean Cell Hemoglobin Concentration : 32.5 gm/dL  Auto Neutrophil # : x  Auto Lymphocyte # : x  Auto Monocyte # : x  Auto Eosinophil # : x  Auto Basophil # : x  Auto Neutrophil % : x  Auto Lymphocyte % : x  Auto Monocyte % : x  Auto Eosinophil % : x  Auto Basophil % : x        142  |  111<H>  |  7   ----------------------------<  103<H>  4.0   |  24  |  0.76    Ca    7.9<L>      28 Sep 2022 06:26  Phos  3.4       Mg     2.0           CAPILLARY BLOOD GLUCOSE      POCT Blood Glucose.: 129 mg/dL (27 Sep 2022 21:30)                EKG:    ECHO, US:    RADIOLOGY:

## 2022-09-29 LAB
ANION GAP SERPL CALC-SCNC: 10 MMOL/L — SIGNIFICANT CHANGE UP (ref 5–17)
BUN SERPL-MCNC: 8 MG/DL — SIGNIFICANT CHANGE UP (ref 7–23)
CALCIUM SERPL-MCNC: 7.5 MG/DL — LOW (ref 8.4–10.5)
CHLORIDE SERPL-SCNC: 108 MMOL/L — SIGNIFICANT CHANGE UP (ref 96–108)
CO2 SERPL-SCNC: 21 MMOL/L — LOW (ref 22–31)
CREAT SERPL-MCNC: 0.71 MG/DL — SIGNIFICANT CHANGE UP (ref 0.5–1.3)
EGFR: 79 ML/MIN/1.73M2 — SIGNIFICANT CHANGE UP
GLUCOSE BLDC GLUCOMTR-MCNC: 104 MG/DL — HIGH (ref 70–99)
GLUCOSE BLDC GLUCOMTR-MCNC: 97 MG/DL — SIGNIFICANT CHANGE UP (ref 70–99)
GLUCOSE SERPL-MCNC: 101 MG/DL — HIGH (ref 70–99)
HCT VFR BLD CALC: 29.8 % — LOW (ref 34.5–45)
HGB BLD-MCNC: 9.9 G/DL — LOW (ref 11.5–15.5)
MAGNESIUM SERPL-MCNC: 1.8 MG/DL — SIGNIFICANT CHANGE UP (ref 1.6–2.6)
MCHC RBC-ENTMCNC: 30.3 PG — SIGNIFICANT CHANGE UP (ref 27–34)
MCHC RBC-ENTMCNC: 33.2 GM/DL — SIGNIFICANT CHANGE UP (ref 32–36)
MCV RBC AUTO: 91.1 FL — SIGNIFICANT CHANGE UP (ref 80–100)
NRBC # BLD: 0 /100 WBCS — SIGNIFICANT CHANGE UP (ref 0–0)
PHOSPHATE SERPL-MCNC: 3.8 MG/DL — SIGNIFICANT CHANGE UP (ref 2.5–4.5)
PLATELET # BLD AUTO: 145 K/UL — LOW (ref 150–400)
POTASSIUM SERPL-MCNC: 3.9 MMOL/L — SIGNIFICANT CHANGE UP (ref 3.5–5.3)
POTASSIUM SERPL-SCNC: 3.9 MMOL/L — SIGNIFICANT CHANGE UP (ref 3.5–5.3)
RBC # BLD: 3.27 M/UL — LOW (ref 3.8–5.2)
RBC # FLD: 15.5 % — HIGH (ref 10.3–14.5)
SODIUM SERPL-SCNC: 139 MMOL/L — SIGNIFICANT CHANGE UP (ref 135–145)
WBC # BLD: 6.96 K/UL — SIGNIFICANT CHANGE UP (ref 3.8–10.5)
WBC # FLD AUTO: 6.96 K/UL — SIGNIFICANT CHANGE UP (ref 3.8–10.5)

## 2022-09-29 PROCEDURE — 99233 SBSQ HOSP IP/OBS HIGH 50: CPT

## 2022-09-29 RX ORDER — ATORVASTATIN CALCIUM 80 MG/1
40 TABLET, FILM COATED ORAL AT BEDTIME
Refills: 0 | Status: DISCONTINUED | OUTPATIENT
Start: 2022-09-29 | End: 2022-09-30

## 2022-09-29 RX ORDER — MAGNESIUM SULFATE 500 MG/ML
1 VIAL (ML) INJECTION ONCE
Refills: 0 | Status: COMPLETED | OUTPATIENT
Start: 2022-09-29 | End: 2022-09-29

## 2022-09-29 RX ORDER — POTASSIUM CHLORIDE 20 MEQ
20 PACKET (EA) ORAL ONCE
Refills: 0 | Status: COMPLETED | OUTPATIENT
Start: 2022-09-29 | End: 2022-09-29

## 2022-09-29 RX ADMIN — Medication 3 MILLILITER(S): at 05:35

## 2022-09-29 RX ADMIN — AMLODIPINE BESYLATE 5 MILLIGRAM(S): 2.5 TABLET ORAL at 05:35

## 2022-09-29 RX ADMIN — GABAPENTIN 100 MILLIGRAM(S): 400 CAPSULE ORAL at 05:35

## 2022-09-29 RX ADMIN — Medication 3 MILLILITER(S): at 11:22

## 2022-09-29 RX ADMIN — Medication 3 MILLILITER(S): at 00:27

## 2022-09-29 RX ADMIN — Medication 20 MILLIEQUIVALENT(S): at 08:06

## 2022-09-29 RX ADMIN — Medication 3 MILLILITER(S): at 18:46

## 2022-09-29 RX ADMIN — Medication 650 MILLIGRAM(S): at 12:38

## 2022-09-29 RX ADMIN — Medication 1 MILLIGRAM(S): at 11:21

## 2022-09-29 RX ADMIN — Medication 650 MILLIGRAM(S): at 13:20

## 2022-09-29 RX ADMIN — Medication 100 MICROGRAM(S): at 05:35

## 2022-09-29 RX ADMIN — GABAPENTIN 100 MILLIGRAM(S): 400 CAPSULE ORAL at 13:37

## 2022-09-29 RX ADMIN — Medication 100 MILLIGRAM(S): at 18:45

## 2022-09-29 RX ADMIN — Medication 100 MILLIGRAM(S): at 11:22

## 2022-09-29 RX ADMIN — Medication 100 MILLIGRAM(S): at 05:35

## 2022-09-29 RX ADMIN — CLOPIDOGREL BISULFATE 75 MILLIGRAM(S): 75 TABLET, FILM COATED ORAL at 11:21

## 2022-09-29 RX ADMIN — PANTOPRAZOLE SODIUM 40 MILLIGRAM(S): 20 TABLET, DELAYED RELEASE ORAL at 18:46

## 2022-09-29 RX ADMIN — Medication 100 GRAM(S): at 08:06

## 2022-09-29 RX ADMIN — PANTOPRAZOLE SODIUM 40 MILLIGRAM(S): 20 TABLET, DELAYED RELEASE ORAL at 05:35

## 2022-09-29 RX ADMIN — GABAPENTIN 100 MILLIGRAM(S): 400 CAPSULE ORAL at 21:28

## 2022-09-29 NOTE — PROGRESS NOTE ADULT - SUBJECTIVE AND OBJECTIVE BOX
Patient is a 89y old  Female who presents with a chief complaint of Bloody stools (29 Sep 2022 09:36)      INTERVAL HPI/OVERNIGHT EVENTS: offers no new complaints; current symptoms resolving    MEDICATIONS  (STANDING):  albuterol/ipratropium for Nebulization 3 milliLiter(s) Nebulizer every 6 hours  allopurinol 100 milliGRAM(s) Oral daily  amLODIPine   Tablet 5 milliGRAM(s) Oral daily  clopidogrel Tablet 75 milliGRAM(s) Oral daily  dextrose 50% Injectable 25 Gram(s) IV Push once  dextrose 50% Injectable 12.5 Gram(s) IV Push once  dextrose 50% Injectable 25 Gram(s) IV Push once  folic acid 1 milliGRAM(s) Oral daily  gabapentin 100 milliGRAM(s) Oral three times a day  glucagon  Injectable 1 milliGRAM(s) IntraMuscular once  influenza  Vaccine (HIGH DOSE) 0.7 milliLiter(s) IntraMuscular once  insulin lispro (ADMELOG) corrective regimen sliding scale   SubCutaneous at bedtime  levothyroxine 100 MICROGram(s) Oral daily  nitrofurantoin monohydrate/macrocrystals (MACROBID) 100 milliGRAM(s) Oral two times a day  pantoprazole  Injectable 40 milliGRAM(s) IV Push two times a day    MEDICATIONS  (PRN):  acetaminophen     Tablet .. 650 milliGRAM(s) Oral every 6 hours PRN Mild Pain (1 - 3), Moderate Pain (4 - 6)  dextrose Oral Gel 15 Gram(s) Oral once PRN Blood Glucose LESS THAN 70 milliGRAM(s)/deciliter  ondansetron Injectable 4 milliGRAM(s) IV Push every 6 hours PRN Nausea and/or Vomiting      __________________________________________________  REVIEW OF SYSTEMS:    CONSTITUTIONAL: No fever,   EYES: no acute visual disturbances  NECK: No pain or stiffness  RESPIRATORY: No cough; No shortness of breath  CARDIOVASCULAR: No chest pain, no palpitations  GASTROINTESTINAL: No pain. No nausea or vomiting; No diarrhea   NEUROLOGICAL: No headache or numbness, no tremors  MUSCULOSKELETAL: No joint pain, no muscle pain  GENITOURINARY: no dysuria, no frequency, no hesitancy  PSYCHIATRY: no depression , no anxiety  ALL OTHER  ROS negative        Vital Signs Last 24 Hrs  T(C): 37.2 (29 Sep 2022 09:10), Max: 37.5 (29 Sep 2022 05:00)  T(F): 98.9 (29 Sep 2022 09:10), Max: 99.5 (29 Sep 2022 05:00)  HR: 82 (29 Sep 2022 11:52) (82 - 90)  BP: 103/59 (29 Sep 2022 11:52) (99/58 - 117/59)  BP(mean): 78 (29 Sep 2022 11:52) (73 - 81)  RR: 14 (29 Sep 2022 11:52) (14 - 16)  SpO2: 100% (29 Sep 2022 11:52) (96% - 100%)    Parameters below as of 29 Sep 2022 11:52  Patient On (Oxygen Delivery Method): room air        ________________________________________________  PHYSICAL EXAM:  GENERAL: NAD  HEENT: Normocephalic;  conjunctivae and sclerae clear; moist mucous membranes;   NECK : supple  CHEST/LUNG: Clear to auscultation bilaterally with good air entry   HEART: S1 S2  regular; no murmurs, gallops or rubs  ABDOMEN: Soft, Nontender, Nondistended; Bowel sounds present  EXTREMITIES: no cyanosis; no edema; no calf tenderness  SKIN: warm and dry; no rash  NERVOUS SYSTEM:  Awake and alert     _________________________________________________  LABS:                        9.9    6.96  )-----------( 145      ( 29 Sep 2022 05:34 )             29.8     09-29    139  |  108  |  8   ----------------------------<  101<H>  3.9   |  21<L>  |  0.71    Ca    7.5<L>      29 Sep 2022 05:34  Phos  3.8     09-29  Mg     1.8     09-29          CAPILLARY BLOOD GLUCOSE      POCT Blood Glucose.: 97 mg/dL (29 Sep 2022 06:52)  POCT Blood Glucose.: 120 mg/dL (28 Sep 2022 22:20)        RADIOLOGY & ADDITIONAL TESTS:      Plan of care was discussed with patient and /or primary care giver; all questions and concerns were addressed and care was aligned with patient's wishes.

## 2022-09-29 NOTE — CHART NOTE - NSCHARTNOTEFT_GEN_A_CORE
Admitting Diagnosis:   Patient is a 89y old  Female who presents with a chief complaint of Bloody stools (29 Sep 2022 13:18)    PAST MEDICAL & SURGICAL HISTORY:  HTN (hypertension)  COPD (chronic obstructive pulmonary disease)  Arthritis  Polymyalgia rheumatica  Hyperlipidemia  Chronic diastolic heart failure  Coronary artery disease  Diabetes  H/O bilateral oophorectomy  H/O total hysterectomy    Current Nutrition Order:  Low fiber DASH/ TLC diet    PO Intake: Good (%) [ x  ]  Fair (50-75%) [   ] Poor (<25%) [   ]    GI Issues:   WDL, last BM 9/26  No n/v/d/c  No abd distention or discomfort    Pain:  No  pain noted    Skin Integrity:  WDL, mitul score 12  +1 dependent edema  No pressure ulcers noted    Labs:   09-29    139  |  108  |  8   ----------------------------<  101<H>  3.9   |  21<L>  |  0.71    Ca    7.5<L>      29 Sep 2022 05:34  Phos  3.8     09-29  Mg     1.8     09-29    CAPILLARY BLOOD GLUCOSE    POCT Blood Glucose.: 97 mg/dL (29 Sep 2022 06:52)  POCT Blood Glucose.: 120 mg/dL (28 Sep 2022 22:20)    Medications:  MEDICATIONS  (STANDING):  albuterol/ipratropium for Nebulization 3 milliLiter(s) Nebulizer every 6 hours  allopurinol 100 milliGRAM(s) Oral daily  amLODIPine   Tablet 5 milliGRAM(s) Oral daily  clopidogrel Tablet 75 milliGRAM(s) Oral daily  dextrose 50% Injectable 25 Gram(s) IV Push once  dextrose 50% Injectable 12.5 Gram(s) IV Push once  dextrose 50% Injectable 25 Gram(s) IV Push once  folic acid 1 milliGRAM(s) Oral daily  gabapentin 100 milliGRAM(s) Oral three times a day  glucagon  Injectable 1 milliGRAM(s) IntraMuscular once  influenza  Vaccine (HIGH DOSE) 0.7 milliLiter(s) IntraMuscular once  insulin lispro (ADMELOG) corrective regimen sliding scale   SubCutaneous at bedtime  levothyroxine 100 MICROGram(s) Oral daily  nitrofurantoin monohydrate/macrocrystals (MACROBID) 100 milliGRAM(s) Oral two times a day  pantoprazole  Injectable 40 milliGRAM(s) IV Push two times a day    MEDICATIONS  (PRN):  acetaminophen     Tablet .. 650 milliGRAM(s) Oral every 6 hours PRN Mild Pain (1 - 3), Moderate Pain (4 - 6)  dextrose Oral Gel 15 Gram(s) Oral once PRN Blood Glucose LESS THAN 70 milliGRAM(s)/deciliter  ondansetron Injectable 4 milliGRAM(s) IV Push every 6 hours PRN Nausea and/or Vomiting    Admitting Anthropometrics:  Height for BMI (FEET)	5 Feet  Height for BMI (INCHES)	0 Inch(s)  Height for BMI (CENTIMETERS)	152.4 Centimeter(s)  Weight for BMI (lbs)	114 lb  Weight for BMI (kg)	51.7 kg  Body Mass Index	22.2    Weight Change:   No new weights obtained during this admission. Please cont to trend weight biweekly.     Nutrition Focused Physical Exam: Completed [ x 9/26  ]  Not Pertinent [   ]    Estimated energy needs:    lbs. %%. ABW used to calculate energy needs due to pt's current body weight within % IBW. Needs adjusted for age and wt, suspect malnutrition, possible GIB.  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 Presented to [facility] on 9/24 for bloody bowel movements since the morning. In the ED, pt afebrile, HDS, nontoxic appearing, initially admitted to telemetry with GI/cardiology consults and q6 CBCs. S/p multiple episodes of blood per rectum and repeat hb of 6.5 from 8.8. Transferred to the SICU in the setting of presumed LGIB, 3u pRBC (9/25, 2x 9/24), 1u RBC (9/26) scope w/o clot or evidence of active bleeding    On assessment, pt resting in bed. Currently on DASH/ TLC LFD tolerating PO well. Pt consuming >75% of meals. No n/v/d/c. No abd distention. Last BM 9/26. Education provided on low fiber diet guidelines. RD cont to encourage adequate PO intake to prevent further s/sx of malnutrition. Pt appears receptive to edu. Please see nutr recs below.     Previous Nutrition Diagnosis:  severe protein calorie malnutrition RT Suspect PO<EER AEB ~15% wt loss in >1 year, NFPE findings moderate-severe fat/muscle wasting    Active [ x  ]  Resolved [   ]    If resolved, new PES:     Goal:  Consistently meet >75% est needs during hospital stay w/o overt s/s of malnutrition    Recommendations:  1. Cont with low fiber DASH/ TLC diet  2. BM and pain regimen per team  3. Monitor GI, wts, skin  4. Monitor BMP, BG, POCT q6hrs, renal indices, LFTs, lytes, replete prn  5. diet edu prn    Education:   Education provided on low fiber diet guidelines. Pt appears receptive to edu, educational handout left at bedside.    Risk Level: High [   ] Moderate [ x  ] Low [   ]

## 2022-09-29 NOTE — PROGRESS NOTE ADULT - SUBJECTIVE AND OBJECTIVE BOX
SUBJECTIVE: Pt seen and examined by chief resident. Pt is doing well, resting comfortably on bed. Denies abdominal pain. No bloody BMs overnight. No nausea or vomiting. No complaints at this time.    Vital Signs Last 24 Hrs  T(C): 37.5 (29 Sep 2022 05:00), Max: 37.5 (29 Sep 2022 05:00)  T(F): 99.5 (29 Sep 2022 05:00), Max: 99.5 (29 Sep 2022 05:00)  HR: 90 (29 Sep 2022 04:52) (76 - 90)  BP: 104/62 (29 Sep 2022 04:52) (104/62 - 133/76)  BP(mean): 77 (29 Sep 2022 04:52) (77 - 99)  RR: 14 (29 Sep 2022 04:52) (14 - 18)  SpO2: 96% (29 Sep 2022 04:52) (96% - 99%)    Parameters below as of 29 Sep 2022 04:52  Patient On (Oxygen Delivery Method): room air        I&O's Summary    28 Sep 2022 07:01  -  29 Sep 2022 07:00  --------------------------------------------------------  IN: 1220 mL / OUT: 750 mL / NET: 470 mL    Physical Exam:  General Appearance: Appears well, NAD  Pulmonary: Nonlabored breathing, no respiratory distress  Abdomen: Soft, nondisteded, nontender  Extremities: WWP, SCD's in place     LABS:                        9.9    6.96  )-----------( 145      ( 29 Sep 2022 05:34 )             29.8     09-29    139  |  108  |  8   ----------------------------<  101<H>  3.9   |  21<L>  |  0.71    Ca    7.5<L>      29 Sep 2022 05:34  Phos  3.8     09-29  Mg     1.8     09-29

## 2022-09-29 NOTE — PROGRESS NOTE ADULT - SUBJECTIVE AND OBJECTIVE BOX
INTERVAL EVENTS:  -Denies abdominal pain  -No signs/symptoms of bleeding  -Hb stable    PAST MEDICAL & SURGICAL HISTORY:  HTN (hypertension)    COPD (chronic obstructive pulmonary disease)    Arthritis    Polymyalgia rheumatica    Hyperlipidemia    Chronic diastolic heart failure    Coronary artery disease    Diabetes    Moderate aortic stenosis    H/O bilateral oophorectomy    H/O total hysterectomy        MEDICATIONS  (STANDING):  albuterol/ipratropium for Nebulization 3 milliLiter(s) Nebulizer every 6 hours  allopurinol 100 milliGRAM(s) Oral daily  atorvastatin 40 milliGRAM(s) Oral at bedtime  clopidogrel Tablet 75 milliGRAM(s) Oral daily  dextrose 50% Injectable 25 Gram(s) IV Push once  dextrose 50% Injectable 12.5 Gram(s) IV Push once  dextrose 50% Injectable 25 Gram(s) IV Push once  folic acid 1 milliGRAM(s) Oral daily  gabapentin 100 milliGRAM(s) Oral three times a day  glucagon  Injectable 1 milliGRAM(s) IntraMuscular once  influenza  Vaccine (HIGH DOSE) 0.7 milliLiter(s) IntraMuscular once  insulin lispro (ADMELOG) corrective regimen sliding scale   SubCutaneous at bedtime  levothyroxine 100 MICROGram(s) Oral daily  nitrofurantoin monohydrate/macrocrystals (MACROBID) 100 milliGRAM(s) Oral two times a day  pantoprazole  Injectable 40 milliGRAM(s) IV Push two times a day    MEDICATIONS  (PRN):  acetaminophen     Tablet .. 650 milliGRAM(s) Oral every 6 hours PRN Mild Pain (1 - 3), Moderate Pain (4 - 6)  dextrose Oral Gel 15 Gram(s) Oral once PRN Blood Glucose LESS THAN 70 milliGRAM(s)/deciliter  ondansetron Injectable 4 milliGRAM(s) IV Push every 6 hours PRN Nausea and/or Vomiting    T(F): 98.8 (09-29-22 @ 16:05), Max: 99.5 (09-29-22 @ 05:00)  HR: 76 (09-29-22 @ 16:05) (76 - 90)  BP: 92/56 (09-29-22 @ 16:05) (92/56 - 117/59)  BP(mean): 78 (09-29-22 @ 11:52) (73 - 81)  ABP: --  ABP(mean): --  RR: 16 (09-29-22 @ 16:05) (14 - 16)  SpO2: 96% (09-29-22 @ 16:05) (96% - 100%)    I/O Detail 24H    28 Sep 2022 07:01  -  29 Sep 2022 07:00  --------------------------------------------------------  IN:    Lactated Ringers Bolus: 500 mL    Oral Fluid: 720 mL  Total IN: 1220 mL    OUT:    Voided (mL): 750 mL  Total OUT: 750 mL    Total NET: 470 mL      29 Sep 2022 07:01  -  29 Sep 2022 19:41  --------------------------------------------------------  IN:  Total IN: 0 mL    OUT:    Voided (mL): 250 mL  Total OUT: 250 mL    Total NET: -250 mL          PHYSICAL EXAM:  GEN: NAD  HEENT: EOMI   RESP: CTA b/l  CV: RRR. Normal S1/S2. No m/r/g.  ABD: soft, non-distended  EXT: No edema   NEURO: alert and attentive    LABS:  CBC 09-29-22 @ 05:34                        9.9    6.96  )-----------( 145                   29.8       Hgb trend: 9.9 <-- , 9.0 <-- , 10.1 <-- , 9.1 <-- , 8.4 <--   WBC trend: 6.96 <-- , 5.89 <-- , 6.76 <-- , 5.82 <-- , 6.01 <--       CMP 09-29-22 @ 05:34    139  |  108  |  8   ----------------------------<  101<H>  3.9   |  21<L>  |  0.71    Ca    7.5<L>      09-29-22 @ 05:34  Phos  3.8     09-29  Mg     1.8     09-29        Serum Cr trend: 0.71 <-- , 0.76 <-- , 0.74 <-- , 0.61 <--         Cardiac Markers           STUDIES:

## 2022-09-29 NOTE — PROGRESS NOTE ADULT - ATTENDING COMMENTS
Initial attending contact date  9/26/22    . See fellow note written above for details. I reviewed the fellow documentation. I have personally seen and examined this patient. I reviewed vitals, labs, medications, cardiac studies, and additional imaging. I agree with the above fellow's findings and plans as written above with the following additions/statements.    89F w/ HTN, HLD, DM, CAD s/p elective PCI to [facility] 5/2/22 with residual 40% LAD, moderate AS, HFpEF (EF 65% May 2022), hx GI bleed (June 2021 admitted for GIB. Cardiology has been consulted for DAPT recommendations in the setting of recurrent bleed.  -now s/p c-scope without signs of active bleed  -pt now with stable H/H   -pls restart plavix as monotherapy going forward with monitoring of H/H  -Remains euvolemic on exam, ok to hold lasix for now
Initial attending contact date  9/26/22    . See fellow note written above for details. I reviewed the fellow documentation. I have personally seen and examined this patient. I reviewed vitals, labs, medications, cardiac studies, and additional imaging. I agree with the above fellow's findings and plans as written above with the following additions/statements.    89F w/ HTN, HLD, DM, CAD s/p elective PCI to [facility] 5/2/22 with residual 40% LAD, moderate AS, HFpEF (EF 65% May 2022), hx GI bleed (June 2021 admitted for GIB. Cardiology has been consulted for DAPT recommendations in the setting of recurrent bleed.  -now s/p c-scope without signs of active bleed  -pt now with stable H/H on plavix monotherapy  -No active cardiac issues   -Pls reconsult as needed
Initial attending contact date  9/26/22    . See fellow note written above for details. I reviewed the fellow documentation. I have personally seen and examined this patient. I reviewed vitals, labs, medications, cardiac studies, and additional imaging. I agree with the above fellow's findings and plans as written above with the following additions/statements.    89F w/ HTN, HLD, DM, CAD s/p elective PCI to [facility] 5/2/22 with residual 40% LAD, moderate AS, HFpEF (EF 65% May 2022), hx GI bleed (June 2021 admitted for GIB. Cardiology has been consulted for DAPT recommendations in the setting of recurrent bleed.  -pt now with stable H/H awaiting c-scope   -Ok to continue to hold DAPT given CHETAN placed in elective setting and located in [facility]  -Pending results of GI work up, plan to start plavix 75 mg qd as monotherapy if H/H remains stable
89F w/ hx of GI bleeding p/w rectal bleeding. Tolerated colonoscopy and no evidence of active bleeding. Finding is consistent with diverticular bleeding. Now resolved and doing well. Hgb remains stable.
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 Presented to [facility] for rectal bleeding.    Given recent colonoscopy with diverticular disease, suspect diverticular bleeding. Patient with ongoing rectal bleeding and drop in Hb. to 4.6 (though may partially be due to dilution). Prep for colonoscopy tomorrow.    Agree with plan as outlined above.    ANGELY Rain MD  GI attending
LGIB in setting of recent cardiac stents; keep Hb >9; planned for scope.

## 2022-09-30 ENCOUNTER — TRANSCRIPTION ENCOUNTER (OUTPATIENT)
Age: 87
End: 2022-09-30

## 2022-09-30 VITALS
RESPIRATION RATE: 17 BRPM | OXYGEN SATURATION: 98 % | SYSTOLIC BLOOD PRESSURE: 97 MMHG | HEART RATE: 91 BPM | TEMPERATURE: 99 F | DIASTOLIC BLOOD PRESSURE: 61 MMHG

## 2022-09-30 LAB
ANION GAP SERPL CALC-SCNC: 11 MMOL/L — SIGNIFICANT CHANGE UP (ref 5–17)
BLD GP AB SCN SERPL QL: NEGATIVE — SIGNIFICANT CHANGE UP
BUN SERPL-MCNC: 11 MG/DL — SIGNIFICANT CHANGE UP (ref 7–23)
CALCIUM SERPL-MCNC: 7.5 MG/DL — LOW (ref 8.4–10.5)
CHLORIDE SERPL-SCNC: 106 MMOL/L — SIGNIFICANT CHANGE UP (ref 96–108)
CO2 SERPL-SCNC: 17 MMOL/L — LOW (ref 22–31)
CREAT SERPL-MCNC: 0.97 MG/DL — SIGNIFICANT CHANGE UP (ref 0.5–1.3)
EGFR: 54 ML/MIN/1.73M2 — LOW
GLUCOSE SERPL-MCNC: 96 MG/DL — SIGNIFICANT CHANGE UP (ref 70–99)
HCT VFR BLD CALC: 36.3 % — SIGNIFICANT CHANGE UP (ref 34.5–45)
HGB BLD-MCNC: 10.8 G/DL — LOW (ref 11.5–15.5)
MAGNESIUM SERPL-MCNC: 2.2 MG/DL — SIGNIFICANT CHANGE UP (ref 1.6–2.6)
MCHC RBC-ENTMCNC: 29.8 GM/DL — LOW (ref 32–36)
MCHC RBC-ENTMCNC: 30.5 PG — SIGNIFICANT CHANGE UP (ref 27–34)
MCV RBC AUTO: 102.5 FL — HIGH (ref 80–100)
NRBC # BLD: 0 /100 WBCS — SIGNIFICANT CHANGE UP (ref 0–0)
PHOSPHATE SERPL-MCNC: 4.1 MG/DL — SIGNIFICANT CHANGE UP (ref 2.5–4.5)
PLATELET # BLD AUTO: 138 K/UL — LOW (ref 150–400)
POTASSIUM SERPL-MCNC: 4.6 MMOL/L — SIGNIFICANT CHANGE UP (ref 3.5–5.3)
POTASSIUM SERPL-SCNC: 4.6 MMOL/L — SIGNIFICANT CHANGE UP (ref 3.5–5.3)
RBC # BLD: 3.54 M/UL — LOW (ref 3.8–5.2)
RBC # FLD: 15.9 % — HIGH (ref 10.3–14.5)
RH IG SCN BLD-IMP: POSITIVE — SIGNIFICANT CHANGE UP
SODIUM SERPL-SCNC: 134 MMOL/L — LOW (ref 135–145)
WBC # BLD: 6.94 K/UL — SIGNIFICANT CHANGE UP (ref 3.8–10.5)
WBC # FLD AUTO: 6.94 K/UL — SIGNIFICANT CHANGE UP (ref 3.8–10.5)

## 2022-09-30 PROCEDURE — 86901 BLOOD TYPING SEROLOGIC RH(D): CPT

## 2022-09-30 PROCEDURE — 96374 THER/PROPH/DIAG INJ IV PUSH: CPT

## 2022-09-30 PROCEDURE — 82962 GLUCOSE BLOOD TEST: CPT

## 2022-09-30 PROCEDURE — 94640 AIRWAY INHALATION TREATMENT: CPT

## 2022-09-30 PROCEDURE — 83690 ASSAY OF LIPASE: CPT

## 2022-09-30 PROCEDURE — 83735 ASSAY OF MAGNESIUM: CPT

## 2022-09-30 PROCEDURE — P9016: CPT

## 2022-09-30 PROCEDURE — 97161 PT EVAL LOW COMPLEX 20 MIN: CPT

## 2022-09-30 PROCEDURE — 99233 SBSQ HOSP IP/OBS HIGH 50: CPT

## 2022-09-30 PROCEDURE — 99285 EMERGENCY DEPT VISIT HI MDM: CPT

## 2022-09-30 PROCEDURE — 86900 BLOOD TYPING SEROLOGIC ABO: CPT

## 2022-09-30 PROCEDURE — 81001 URINALYSIS AUTO W/SCOPE: CPT

## 2022-09-30 PROCEDURE — 86850 RBC ANTIBODY SCREEN: CPT

## 2022-09-30 PROCEDURE — 96375 TX/PRO/DX INJ NEW DRUG ADDON: CPT

## 2022-09-30 PROCEDURE — 87086 URINE CULTURE/COLONY COUNT: CPT

## 2022-09-30 PROCEDURE — 36415 COLL VENOUS BLD VENIPUNCTURE: CPT

## 2022-09-30 PROCEDURE — 85730 THROMBOPLASTIN TIME PARTIAL: CPT

## 2022-09-30 PROCEDURE — 85025 COMPLETE CBC W/AUTO DIFF WBC: CPT

## 2022-09-30 PROCEDURE — 87635 SARS-COV-2 COVID-19 AMP PRB: CPT

## 2022-09-30 PROCEDURE — 99231 SBSQ HOSP IP/OBS SF/LOW 25: CPT

## 2022-09-30 PROCEDURE — 99232 SBSQ HOSP IP/OBS MODERATE 35: CPT | Mod: GC

## 2022-09-30 PROCEDURE — 87184 SC STD DISK METHOD PER PLATE: CPT

## 2022-09-30 PROCEDURE — 87186 SC STD MICRODIL/AGAR DIL: CPT

## 2022-09-30 PROCEDURE — 86923 COMPATIBILITY TEST ELECTRIC: CPT

## 2022-09-30 PROCEDURE — 71045 X-RAY EXAM CHEST 1 VIEW: CPT

## 2022-09-30 PROCEDURE — 36430 TRANSFUSION BLD/BLD COMPNT: CPT

## 2022-09-30 PROCEDURE — 80048 BASIC METABOLIC PNL TOTAL CA: CPT

## 2022-09-30 PROCEDURE — 85610 PROTHROMBIN TIME: CPT

## 2022-09-30 PROCEDURE — 85027 COMPLETE CBC AUTOMATED: CPT

## 2022-09-30 PROCEDURE — 84100 ASSAY OF PHOSPHORUS: CPT

## 2022-09-30 PROCEDURE — 80053 COMPREHEN METABOLIC PANEL: CPT

## 2022-09-30 RX ORDER — EZETIMIBE 10 MG/1
1 TABLET ORAL
Qty: 0 | Refills: 0 | DISCHARGE

## 2022-09-30 RX ORDER — NITROFURANTOIN MACROCRYSTAL 50 MG
1 CAPSULE ORAL
Qty: 4 | Refills: 0
Start: 2022-09-30

## 2022-09-30 RX ORDER — FUROSEMIDE 40 MG
1 TABLET ORAL
Qty: 0 | Refills: 0 | DISCHARGE

## 2022-09-30 RX ORDER — METHOTREXATE 2.5 MG/1
1 TABLET ORAL
Qty: 0 | Refills: 0 | DISCHARGE

## 2022-09-30 RX ORDER — ATORVASTATIN CALCIUM 80 MG/1
1 TABLET, FILM COATED ORAL
Qty: 30 | Refills: 0
Start: 2022-09-30 | End: 2022-10-29

## 2022-09-30 RX ORDER — POLYETHYLENE GLYCOL 3350 17 G/17G
17 POWDER, FOR SOLUTION ORAL ONCE
Refills: 0 | Status: COMPLETED | OUTPATIENT
Start: 2022-09-30 | End: 2022-09-30

## 2022-09-30 RX ORDER — AMLODIPINE BESYLATE 2.5 MG/1
1 TABLET ORAL
Qty: 0 | Refills: 0 | DISCHARGE

## 2022-09-30 RX ADMIN — PANTOPRAZOLE SODIUM 40 MILLIGRAM(S): 20 TABLET, DELAYED RELEASE ORAL at 05:08

## 2022-09-30 RX ADMIN — GABAPENTIN 100 MILLIGRAM(S): 400 CAPSULE ORAL at 05:08

## 2022-09-30 RX ADMIN — POLYETHYLENE GLYCOL 3350 17 GRAM(S): 17 POWDER, FOR SOLUTION ORAL at 08:51

## 2022-09-30 RX ADMIN — Medication 100 MICROGRAM(S): at 05:08

## 2022-09-30 RX ADMIN — Medication 1 MILLIGRAM(S): at 11:08

## 2022-09-30 RX ADMIN — Medication 650 MILLIGRAM(S): at 10:08

## 2022-09-30 RX ADMIN — Medication 100 MILLIGRAM(S): at 05:07

## 2022-09-30 RX ADMIN — Medication 650 MILLIGRAM(S): at 11:05

## 2022-09-30 RX ADMIN — GABAPENTIN 100 MILLIGRAM(S): 400 CAPSULE ORAL at 13:01

## 2022-09-30 RX ADMIN — CLOPIDOGREL BISULFATE 75 MILLIGRAM(S): 75 TABLET, FILM COATED ORAL at 11:08

## 2022-09-30 RX ADMIN — Medication 100 MILLIGRAM(S): at 11:08

## 2022-09-30 RX ADMIN — Medication 3 MILLILITER(S): at 11:10

## 2022-09-30 NOTE — PROGRESS NOTE ADULT - PROVIDER SPECIALTY LIST ADULT
Cardiology
Colorectal Surgery
Gastroenterology
SICU
Cardiology
Internal Medicine
Surgery
SICU
Surgery
Surgery
Gastroenterology
Internal Medicine
SICU
Surgery
Surgery

## 2022-09-30 NOTE — DISCHARGE NOTE PROVIDER - NSDCMRMEDTOKEN_GEN_ALL_CORE_FT
allopurinol 100 mg oral tablet: 1 tab(s) orally once a day  atorvastatin 40 mg oral tablet: 1 tab(s) orally once a day (at bedtime) MDD:1 tablet  clopidogrel 75 mg oral tablet: 1 tab(s) orally once a day  folic acid 1 mg oral tablet: 1 tab(s) orally once a day  gabapentin 100 mg oral capsule: 1 tab(s) orally once a day (in the morning)  gabapentin 100 mg oral capsule: 1 tab(s) orally once a day (in the afternoon)  gabapentin 400 mg oral capsule: 1 tab(s) orally once a day (in the evening)  levothyroxine 100 mcg (0.1 mg) oral tablet: 1 tab(s) orally once a day  methotrexate 2.5 mg oral tablet: 1 tab(s) orally once a week on wednesdays  oxybutynin 5 mg oral tablet: 1 tab(s) orally once a day  pantoprazole 40 mg oral delayed release tablet: 1 tab(s) orally once a day (before a meal)  raloxifene 60 mg oral tablet: 1 tab(s) orally once a day  tiZANidine 2 mg oral tablet: 1 tab(s) orally 2 times a day   allopurinol 100 mg oral tablet: 1 tab(s) orally once a day  atorvastatin 40 mg oral tablet: 1 tab(s) orally once a day (at bedtime) MDD:1 tablet  clopidogrel 75 mg oral tablet: 1 tab(s) orally once a day  folic acid 1 mg oral tablet: 1 tab(s) orally once a day  gabapentin 100 mg oral capsule: 1 tab(s) orally once a day (in the morning)  gabapentin 100 mg oral capsule: 1 tab(s) orally once a day (in the afternoon)  gabapentin 400 mg oral capsule: 1 tab(s) orally once a day (in the evening)  levothyroxine 100 mcg (0.1 mg) oral tablet: 1 tab(s) orally once a day  Macrobid 100 mg oral capsule: 1 cap(s) orally every 12 hours MDD:2  oxybutynin 5 mg oral tablet: 1 tab(s) orally once a day  pantoprazole 40 mg oral delayed release tablet: 1 tab(s) orally once a day (before a meal)  raloxifene 60 mg oral tablet: 1 tab(s) orally once a day  tiZANidine 2 mg oral tablet: 1 tab(s) orally 2 times a day

## 2022-09-30 NOTE — DISCHARGE NOTE PROVIDER - CARE PROVIDER_API CALL
Connor Rubio  [address]  [address]  [place], [place] [address]  Phone: [phone]  Fax: [phone]  Follow Up Time:

## 2022-09-30 NOTE — DISCHARGE NOTE NURSING/CASE MANAGEMENT/SOCIAL WORK - NSDCFUADDAPPT_GEN_ALL_CORE_FT
Please follow up with Dr. Rubio in 1-2 weeks. Call his office to schedule an appointment: [phone].    Please follow up with your PCP as soon as possible to discuss management of high blood pressure medications. Do not continue your blood pressure medications until your follow up with your PCP.

## 2022-09-30 NOTE — PROGRESS NOTE ADULT - SUBJECTIVE AND OBJECTIVE BOX
Patient is a 89y old  Female who presents with a chief complaint of Bloody stools (30 Sep 2022 07:16)      INTERVAL HPI/OVERNIGHT EVENTS: offers no new complaints; temp of 100f noted     MEDICATIONS  (STANDING):  albuterol/ipratropium for Nebulization 3 milliLiter(s) Nebulizer every 6 hours  allopurinol 100 milliGRAM(s) Oral daily  atorvastatin 40 milliGRAM(s) Oral at bedtime  clopidogrel Tablet 75 milliGRAM(s) Oral daily  dextrose 50% Injectable 25 Gram(s) IV Push once  dextrose 50% Injectable 12.5 Gram(s) IV Push once  dextrose 50% Injectable 25 Gram(s) IV Push once  folic acid 1 milliGRAM(s) Oral daily  gabapentin 100 milliGRAM(s) Oral three times a day  glucagon  Injectable 1 milliGRAM(s) IntraMuscular once  influenza  Vaccine (HIGH DOSE) 0.7 milliLiter(s) IntraMuscular once  insulin lispro (ADMELOG) corrective regimen sliding scale   SubCutaneous at bedtime  levothyroxine 100 MICROGram(s) Oral daily  nitrofurantoin monohydrate/macrocrystals (MACROBID) 100 milliGRAM(s) Oral two times a day  pantoprazole  Injectable 40 milliGRAM(s) IV Push two times a day    MEDICATIONS  (PRN):  acetaminophen     Tablet .. 650 milliGRAM(s) Oral every 6 hours PRN Mild Pain (1 - 3), Moderate Pain (4 - 6)  dextrose Oral Gel 15 Gram(s) Oral once PRN Blood Glucose LESS THAN 70 milliGRAM(s)/deciliter  ondansetron Injectable 4 milliGRAM(s) IV Push every 6 hours PRN Nausea and/or Vomiting      __________________________________________________  REVIEW OF SYSTEMS:    CONSTITUTIONAL: No fever,   EYES: no acute visual disturbances  NECK: No pain or stiffness  RESPIRATORY: No cough; No shortness of breath  CARDIOVASCULAR: No chest pain, no palpitations  GASTROINTESTINAL: No pain. No nausea or vomiting; No diarrhea   NEUROLOGICAL: No headache or numbness, no tremors  MUSCULOSKELETAL: No joint pain, no muscle pain  GENITOURINARY: no dysuria, no frequency, no hesitancy  PSYCHIATRY: no depression , no anxiety  ALL OTHER  ROS negative        Vital Signs Last 24 Hrs  T(C): 37.1 (30 Sep 2022 13:08), Max: 37.8 (30 Sep 2022 09:00)  T(F): 98.8 (30 Sep 2022 13:08), Max: 100 (30 Sep 2022 09:00)  HR: 91 (30 Sep 2022 13:08) (76 - 91)  BP: 97/61 (30 Sep 2022 13:08) (92/56 - 108/61)  BP(mean): --  RR: 17 (30 Sep 2022 13:08) (16 - 17)  SpO2: 98% (30 Sep 2022 13:08) (96% - 98%)    Parameters below as of 30 Sep 2022 13:08  Patient On (Oxygen Delivery Method): room air        ________________________________________________  PHYSICAL EXAM:  GENERAL: NAD  HEENT: Normocephalic;  conjunctivae and sclerae clear; moist mucous membranes;   NECK : supple  CHEST/LUNG: Clear to auscultation bilaterally with good air entry   HEART: S1 S2  regular; no murmurs, gallops or rubs  ABDOMEN: Soft, Nontender, Nondistended; Bowel sounds present  EXTREMITIES: no cyanosis; no edema; no calf tenderness  SKIN: warm and dry; no rash  NERVOUS SYSTEM:  Awake and alert; Oriented  to place, person and time ; no new deficits    _________________________________________________  LABS:                        10.8   6.94  )-----------( 138      ( 30 Sep 2022 06:48 )             36.3     09-30    134<L>  |  106  |  11  ----------------------------<  96  4.6   |  17<L>  |  0.97    Ca    7.5<L>      30 Sep 2022 06:48  Phos  4.1     09-30  Mg     2.2     09-30          CAPILLARY BLOOD GLUCOSE      POCT Blood Glucose.: 104 mg/dL (29 Sep 2022 21:19)        RADIOLOGY & ADDITIONAL TESTS:      Plan of care was discussed with patient and /or primary care giver; all questions and concerns were addressed and care was aligned with patient's wishes.

## 2022-09-30 NOTE — DISCHARGE NOTE PROVIDER - NSDCCPCAREPLAN_GEN_ALL_CORE_FT
PRINCIPAL DISCHARGE DIAGNOSIS  Diagnosis: GIB (gastrointestinal bleeding)  Assessment and Plan of Treatment:

## 2022-09-30 NOTE — PROGRESS NOTE ADULT - SUBJECTIVE AND OBJECTIVE BOX
SUBJECTIVE: Patient seen and examined bedside by chief resident.  patient admits to no bowel movements overnight. denies abdominal pain. feels well. tolerating regular diet     clopidogrel Tablet 75 milliGRAM(s) Oral daily  nitrofurantoin monohydrate/macrocrystals (MACROBID) 100 milliGRAM(s) Oral two times a day      Vital Signs Last 24 Hrs  T(C): 37 (30 Sep 2022 04:36), Max: 37.2 (29 Sep 2022 09:10)  T(F): 98.6 (30 Sep 2022 04:36), Max: 98.9 (29 Sep 2022 09:10)  HR: 83 (30 Sep 2022 04:36) (76 - 88)  BP: 108/61 (30 Sep 2022 04:36) (92/56 - 108/61)  BP(mean): 78 (29 Sep 2022 11:52) (73 - 78)  RR: 17 (30 Sep 2022 04:36) (14 - 17)  SpO2: 97% (30 Sep 2022 04:36) (96% - 100%)    Parameters below as of 30 Sep 2022 04:36  Patient On (Oxygen Delivery Method): room air      I&O's Detail    29 Sep 2022 07:01  -  30 Sep 2022 07:00  --------------------------------------------------------  IN:    Oral Fluid: 400 mL  Total IN: 400 mL    OUT:    Voided (mL): 600 mL  Total OUT: 600 mL    Total NET: -200 mL          General: NAD, resting comfortably in bed  C/V: NSR  Pulm: Nonlabored breathing, no respiratory distress  Abd: soft, NT/ND.  Extrem: WWP, no edema, SCDs in place        LABS:                        10.8   6.94  )-----------( 138      ( 30 Sep 2022 06:48 )             36.3     09-29    139  |  108  |  8   ----------------------------<  101<H>  3.9   |  21<L>  |  0.71    Ca    7.5<L>      29 Sep 2022 05:34  Phos  3.8     09-29  Mg     1.8     09-29            RADIOLOGY & ADDITIONAL STUDIES:

## 2022-09-30 NOTE — PROGRESS NOTE ADULT - ASSESSMENT
89F with PMH HTN, HLD, DM, CAD s/p cath w/ CHETAN (May 2022, on ASA/plavix), HFpEF (EF 65%, May 2022), moderate AS, hx GI bleed (June 2021 @ 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 Presented to [facility] on 9/24 for bloody bowel movements since the morning. In the ED, pt afebrile, HDS, nontoxic appearing S/p multiple episodes of blood per rectum and repeat hb of 6.5 from 8.8. Transferred to the SICU in the setting of presumed LGIB, 3u pRBC (9/25, 2x 9/24), 1u RBC (9/26) scope w/o clot or evidence of active bleeding    LRD, PPI  ISS  Macrobid (9/27-)  Pain/nausea control  Home meds, holding ASA, Plavix, Lasix, MTX, Prednisone  SCDs, holding SQH  AM labs  
89 y/o F with PMHx HTN, HLD, DM, CAD s/p cath in May 2022, moderate AS, HFpEF (EF 65% May 2022), hx GI bleed (June 2021 @ 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 Presented to [facility] for bloody bowel movements. GI was consulted for hematochezia.    #GI Bleed  Patient presenting with 1 day maroon rectal bleeding with hgb drop ~1u from baseline. She has had prior admission for hematochezia in Jun 2021, likely diverticular in nature. Most likely she is again presenting with a diverticular bleed and will plan for colonoscopy.    Prior evaluation  -EGD (6/9/21): non-erosive gastritis, single polyp in fundus, possible GIST  -Colonoscopy (6/9/21): severe pan-diverticulosis     s/p bedside colonoscopy 9/26/22:    Diverticulosis of the whole colon but mostly concentrated on the sigmoid colon. No active bleeding was noted however due to severe frothing the visualization was limited for any smaller colonic lesions.    Plan:	  Monitor CBC and transfuse PRN  Can resume clopidogrel and monitor for any evidence of further bleeding  Diet as tolerated.  will sign off. call back PRN      case d/w svc attending and primary team    
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 Presented to [facility] on 9/24 for bloody bowel movements since the morning. In the ED, pt afebrile, HDS, nontoxic appearing, initially admitted to telemetry with GI/cardiology consults and q6 CBCs. S/p multiple episodes of blood per rectum and repeat hb of 6.5 from 8.8. Transferred to the SICU in the setting of presumed LGIB, pending scope.     There were no overnight events.  Patient resting comfortably and only reports LLQ discomfort.  Physical examination is benign with soft, nontender, nondistended abdomen.      PLAN  - Follow-up result of C-scope today  - Follow Cardiology recommendations:        * If no additional bleeding within 48 hours of holding DAPT, resume Plavix        * Otherwise initiate IV Cangrelor at the 48 hour marker  - Remainder of care per SICU  - Surgery Team 4C will continue to follow  - Please page Team 4 at [phone] with any questions and/or clinical changes  - Patient discussed with chief and attending    
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 Presented to [facility] on 9/24 for bloody bowel movements since the morning. In the ED, pt afebrile, HDS, nontoxic appearing, initially admitted to telemetry with GI/cardiology consults and q6 CBCs. S/p multiple episodes of blood per rectum and repeat hb of 6.5 from 8.8. Transferred to the SICU in the setting of presumed LGIB, pending scope.     Plan:  Neuro: Pain/nausea control PRN  CV: MAP >65, hx of CHETAN in May 2022, holding ASA/Plavix (cardiology consulted for duration), HFpEF (65%), HTN--holding home lasix/amlodipine, CAD--holding Asa/Plavix   Pulm: Satting well on RA, hx of COPD  GI: NPO, GI consulted for possible scope, ongoing golytely prep, q6hr CBCs. PPI. IVF@60cc in the setting of moderate AS   : Voids  ID: UTI--ceftriaxone 1mg q24 (9/24--)  Endo: hypothyroidism--synthyroid 80mcg IV, mISS, PMR--holding MTX and Prednisone  Heme: 2u PRBCs (9/25), post-transfusion CBC, CBCq6  PPX: SCDs, holding SQH in setting of active bleed  Lines: 2 20G PIVs  PT: not ordered  Dispo: SICU    Plan discussed with attending and chief resident.   ____________________________________________________  INNA Slaughter - Resident   Surgery 
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 Presented to [facility] on 9/24 for bloody bowel movements since the morning. In the ED, pt afebrile, HDS, nontoxic appearing, initially admitted to telemetry with GI/cardiology consults and q6 CBCs. S/p multiple episodes of blood per rectum and repeat hb of 6.5 from 8.8. Transferred to the SICU in the setting of presumed LGIB, pending scope.     Plan:  Neuro: Pain/nausea control PRN  CV: MAP >65, hx of CHETAN in May 2022, holding ASA/Plavix (cardiology consulted for duration), HFpEF (65%), HTN--holding home lasix/amlodipine, CAD--holding Asa/Plavix   Pulm: Satting well on RA, hx of COPD  GI: LRD, q6hr CBCs. PPI qd. Cscope w/o clot or evidence of active bleeding  : Voids  ID: UTI (50k GBS)- dc:// ceftriaxone (9/24-9/25)  Endo: hypothyroidism--synthyroid 80mcg IV, mISS, PMR--holding MTX and Prednisone  Heme: 4u PRBCs (9/26, 9/25, 2x 9/24), transfusion goal >8, post-transfusion CBC, CBCq6  PPX: SCDs, holding SQH in setting of active bleed  Lines: 2 20G PIVs  PT: ordered  Dispo: SDU    
89 y/o F with PMHx HTN, HLD, DM, CAD s/p cath in May 2022, moderate AS, HFpEF (EF 65% May 2022), hx GI bleed (June 2021 @ 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 Presented to [facility] for bloody bowel movements. GI was consulted for hematochezia.    #GI Bleed  Patient presenting with 1 day maroon rectal bleeding with hgb drop ~1u from baseline. She has had prior admission for hematochezia in Jun 2021, likely diverticular in nature. Most likely she is again presenting with a diverticular bleed and will plan for colonoscopy.    Prior evaluation  -EGD (6/9/21): non-erosive gastritis, single polyp in fundus, possible GIST  -Colonoscopy (6/9/21): severe pan-diverticulosis     Recommendations:   - Maintain active T&S, large bore IV access  - Monitor hgb and hemodynamics  - Supportive care per primary team  - Plan for colonoscopy, likely tomorrow after completes prep  - Clear liquid diet today; npo pm (ok sips of water & meds)  - Continue prep with 4L Golytely  - Cardiology consulted, holding ASA/ plavix    case d/w svc attending and primary team    Juliette Velasco MD  Gastroenterology Fellow, PGY -5   
89F with PMH HTN, HLD, DM, CAD s/p cath w/ CHETAN (May 2022, on ASA/plavix), HFpEF (EF 65%, May 2022), moderate AS, hx GI bleed (June 2021 @ 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 Presented to [facility] on 9/24 for bloody bowel movements since the morning. In the ED, pt afebrile, HDS, nontoxic appearing S/p multiple episodes of blood per rectum and repeat hb of 6.5 from 8.8. Transferred to the SICU in the setting of presumed LGIB, 3u pRBC (9/25, 2x 9/24), 1u RBC (9/26) scope w/o clot or evidence of active bleeding    LRD, PPI  ISS  Macrobid (9/27-10/1)  Pain/nausea control  Home meds, holding ASA, Lasix, MTX, Prednisone  SCDs, holding SQH  plavix   AM labs  
89F with PMH HTN, HLD, DM, CAD s/p cath w/ CHETAN (May 2022, on ASA/plavix), HFpEF (EF 65%, May 2022), moderate AS, hx GI bleed (June 2021 @ 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 Presented to [facility] on 9/24 for bloody bowel movements since the morning. In the ED, pt afebrile, HDS, nontoxic appearing, initially admitted to telemetry with GI/cardiology consults and q6 CBCs. S/p multiple episodes of blood per rectum and repeat hb of 6.5 from 8.8. Transferred to the SICU in the setting of presumed LGIB, pending scope.     Bowel Prep and scope today  pain/nausea prn  NPO/IVF  rest of care per SICU
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 Presented to [facility] for bloody bowel movements 9/24.  In the ED, pt afebrile, HDS, nontoxic appearing S/p multiple episodes of blood per rectum and repeat hb of 6.5 from 8.8. Transferred to the SICU in the setting of presumed LGIB, 3u pRBC (9/25, 2x 9/24), 1u RBC (9/26) scope w/o clot or evidence of active bleeding. transferred to [facility] for continued care.     #Lower GI Bleed likely diverticular   #acute blood loss anemia   s/p 4units total PRBC, last transfusion 9/26  scoped on 9/26 by GI which showed: Diverticulosis of the whole colon but mostly concentrated on the sigmoid colon. No active bleeding was noted however due to severe frothing the visualization was limited for any smaller colonic lesions.  Hgb from time of admission: 4.6  Hemoglobin: 9.9 today, no further bm   resumed plavix 9/27  Prior evaluation: EGD (6/9/21): non-erosive gastritis, single polyp in fundus, possible GIST. Colonoscopy (6/9/21): severe pan-diverticulosis   monitor for any further bleeding   cw PPI   diet advanced   continue to trend daily CBC, keep active type and screen         #CAD s/p cath and CHETAN in 5/2022   plavix resumed 9/28, no new bloody bm   would recommend to start atorvastatin 40 qd  on discharge   #hypothyroidism   continue with home medications    #HLD   would recommend to start atorvastatin 40 qd     #thrombocytopenia   plt 121 on admission to 145  continue to monitor with qd cbc   transfus if plt <50k and pt is actively bleeding or ppx plt transfusion if plt drop below 10 k     #HTN   continue with amlodipine, hold if bp <110 on discharge     #UTI  ua with > 10 wbc   symptoms resolved   on macrobid for 10 days       #uncontrolled dm   continue with HSS    #HFpEF:   continue with DASH diet and rec to restrict fluid intake to 1500ml qd  avoid IVF administration     #dvt ppx with scd   
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 Cardiology has been consulted for DAPT recommendations in the setting of recurrent bleed.    Recommendations:  -Patient with history of stent placed electively in [facility] 5/2/22  -Please resume plavix 75mg and monitor for bleeding    
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 Presented to [facility] on 9/24 for bloody bowel movements since the morning. In the ED, pt afebrile, HDS, nontoxic appearing, initially admitted to telemetry with GI/cardiology consults and q6 CBCs. S/p multiple episodes of blood per rectum and repeat hb of 6.5 from 8.8. Transferred to the SICU in the setting of presumed LGIB, pending scope.     Plan:  Neuro: Pain/nausea control PRN  CV: MAP >65, hx of CHETAN in May 2022, holding ASA/Plavix (cardiology consulted for duration), HFpEF (65%), HTN--holding home lasix/amlodipine, CAD--holding Asa/Plavix   Pulm: Satting well on RA, hx of COPD  GI: NPO, GI for Cscope today, q6hr CBCs. PPI BID. IVF@60cc in the setting of moderate AS   : Voids  ID: UTI (50k GBS)- dc:// ceftriaxone (9/24-9/25)  Endo: hypothyroidism--synthyroid 80mcg IV, mISS, PMR--holding MTX and Prednisone  Heme: 3u PRBCs (9/25, 2x 9/24), transfusion goal >8, post-transfusion CBC, CBCq6  PPX: SCDs, holding SQH in setting of active bleed  Lines: 2 20G PIVs  PT: not ordered  Dispo: SICU    
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 Presented to [facility] for bloody bowel movements 9/24.  In the ED, pt afebrile, HDS, nontoxic appearing S/p multiple episodes of blood per rectum and repeat hb of 6.5 from 8.8. Transferred to the SICU in the setting of presumed LGIB, 3u pRBC (9/25, 2x 9/24), 1u RBC (9/26) scope w/o clot or evidence of active bleeding. transferred to [facility] for continued care.     #Lower GI Bleed likely diverticular   #acute blood loss anemia   s/p 4units total PRBC, last transfusion 9/26  scoped on 9/26 by GI which showed: Diverticulosis of the whole colon but mostly concentrated on the sigmoid colon. No active bleeding was noted however due to severe frothing the visualization was limited for any smaller colonic lesions.  Hgb from time of admission: 4.6  Hemoglobin: 10.8 today, no further bm   resumed plavix 9/27  Prior evaluation: EGD (6/9/21): non-erosive gastritis, single polyp in fundus, possible GIST. Colonoscopy (6/9/21): severe pan-diverticulosis   monitor for any further bleeding   cw PPI   diet advanced   continue to trend daily CBC, keep active type and screen         #CAD s/p cath and CHETAN in 5/2022   plavix resumed 9/28, no new bloody bm   would recommend to start atorvastatin 40 qd  on discharge     #hypothyroidism   continue with home medications    #HLD   would recommend to start atorvastatin 40 qd     #thrombocytopenia   plt 121 on admission to 145--138  continue to monitor with qd cbc   transfus if plt <50k and pt is actively bleeding or ppx plt transfusion if plt drop below 10 k     #HTN   continue with amlodipine, hold if bp <110 on discharge     #UTI  ua with > 10 wbc   symptoms resolved   on macrobid for 10 days       #uncontrolled dm   continue with HSS    #HFpEF:   continue with DASH diet and rec to restrict fluid intake to 1500ml qd  avoid IVF administration     #dvt ppx with scd   
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 Presented to [facility] on 9/24 for bloody bowel movements since the morning. In the ED, pt afebrile, HDS, nontoxic appearing S/p multiple episodes of blood per rectum and repeat hb of 6.5 from 8.8. Transferred to the SICU in the setting of presumed LGIB, 3u pRBC (9/25, 2x 9/24), 1u RBC (9/26) scope w/o clot or evidence of active bleeding    LRD, PPI  ISS  Macrobid (9/27-10/1)  Pain/nausea control  Home meds, holding ASA, Lasix, MTX, Prednisone  SCDs, holding SQH  AM labs  
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 Cardiology has been consulted for DAPT recommendations in the setting of recurrent bleed.    Recommendations:  -Patient with history of stent placed electively in [facility] 5/2/22  -S/p colonoscopy without active bleed  -Now on plavix 75mg with no signs of bleeding    Cardiology will sign off. Please reconsult as needed.     
89F w/ HTN, HLD, DM, CAD s/p PCI to [facility] 5/2/22), moderate AS, HFpEF (EF 65% May 2022), hx GI bleed (June 2021 @ 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 Cardiology has been consulted for DAPT recommendations in the setting of recurrent bleed.    Recommendations:  -Patient with history of stent placed electively in [facility] 5/2/22  -Please resume plavix 75mg and monitor for bleeding    
89F w/ HTN, HLD, DM, CAD s/p PCI to [facility] 5/2/22), moderate AS, HFpEF (EF 65% May 2022), hx GI bleed (June 2021 @ 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 Cardiology has been consulted for DAPT recommendations in the setting of recurrent bleed.    Recommendations:  -Patient with history of stent placed electively in [facility] 5/2/22  -Pt awaiting GI workup for bleed. Continue holding DAPT for now. If cleared by GI and H/H stable, would resume plavix 75mg monotherapy

## 2022-09-30 NOTE — DISCHARGE NOTE PROVIDER - NSDCFUADDINST_GEN_ALL_CORE_FT
General Discharge Instructions:  Please resume all regular home medications unless specifically advised not to take a particular medication. Please take any new medications as prescribed.  Please get plenty of rest, continue to ambulate several times per day, and drink adequate amounts of fluids. Avoid lifting weights greater than 5-10 lbs until you follow-up with your surgeon, who will instruct you further regarding activity restrictions.  Avoid driving or operating heavy machinery while taking pain medications.  Please follow-up with your surgeon and Primary Care Provider (PCP) as advised.    Warning Signs:  Please call your doctor or nurse practitioner if you experience the following:  *You experience new chest pain, pressure, squeezing or tightness.  *New or worsening cough, shortness of breath, or wheeze.  *If you are vomiting and cannot keep down fluids or your medications.  *You are getting dehydrated due to continued vomiting, diarrhea, or other reasons. Signs of dehydration include dry mouth, rapid heartbeat, or feeling dizzy or faint when standing.  *You see blood or dark/black material when you vomit or have a bowel movement.  *You experience burning when you urinate, have blood in your urine, or experience a discharge.  *Your pain is not improving within 8-12 hours or is not gone within 24 hours. Call or return immediately if your pain is getting worse, changes location, or moves to your chest or back.  *You have shaking chills, or fever greater than 101.5 degrees Fahrenheit or 38 degrees Celsius.  *Any change in your symptoms, or any new symptoms that concern you. Please continue taking your Plavix but do NOT take aspirin. You must follow up with Dr. Rubio and your PCP who will advise you further.     General Discharge Instructions:  Please resume all regular home medications unless specifically advised not to take a particular medication. Please take any new medications as prescribed.  Please get plenty of rest, continue to ambulate several times per day, and drink adequate amounts of fluids. Avoid lifting weights greater than 5-10 lbs until you follow-up with your surgeon, who will instruct you further regarding activity restrictions.  Avoid driving or operating heavy machinery while taking pain medications.  Please follow-up with your surgeon and Primary Care Provider (PCP) as advised.    Warning Signs:  Please call your doctor or nurse practitioner if you experience the following:  *You experience new chest pain, pressure, squeezing or tightness.  *New or worsening cough, shortness of breath, or wheeze.  *If you are vomiting and cannot keep down fluids or your medications.  *You are getting dehydrated due to continued vomiting, diarrhea, or other reasons. Signs of dehydration include dry mouth, rapid heartbeat, or feeling dizzy or faint when standing.  *You see blood or dark/black material when you vomit or have a bowel movement.  *You experience burning when you urinate, have blood in your urine, or experience a discharge.  *Your pain is not improving within 8-12 hours or is not gone within 24 hours. Call or return immediately if your pain is getting worse, changes location, or moves to your chest or back.  *You have shaking chills, or fever greater than 101.5 degrees Fahrenheit or 38 degrees Celsius.  *Any change in your symptoms, or any new symptoms that concern you.

## 2022-09-30 NOTE — DISCHARGE NOTE PROVIDER - NSDCFUADDAPPT_GEN_ALL_CORE_FT
Please follow up with Dr. Rubio in 1-2 weeks. Call his office to schedule an appointment: [phone].   Please follow up with Dr. Rubio in 1-2 weeks. Call his office to schedule an appointment: [phone].    Please follow up with your PCP as soon as possible to discuss management of high blood pressure medications. Do not continue your blood pressure medications until your follow up with your PCP.

## 2022-09-30 NOTE — PROGRESS NOTE ADULT - REASON FOR ADMISSION
Bloody stools

## 2022-09-30 NOTE — DISCHARGE NOTE NURSING/CASE MANAGEMENT/SOCIAL WORK - NSDCPEFALRISK_GEN_ALL_CORE
For information on Fall & Injury Prevention, visit: [email or web address] OR  [email or web address] OR  https://www.cdc.gov/steadi/patient.html

## 2022-09-30 NOTE — DISCHARGE NOTE PROVIDER - PROVIDER TOKENS
FREE:[LAST:[Ramiro],FIRST:[Connor],PHONE:[phone]],FAX:[phone]],ADDRESS:[[address] [place] [address]]]

## 2022-09-30 NOTE — DISCHARGE NOTE NURSING/CASE MANAGEMENT/SOCIAL WORK - PATIENT PORTAL LINK FT
You can access the FollowMyHealth Patient Portal offered by [facility] by registering at the following website: [email or web address]. By joining [employer]’s FollowMyHealth portal, you will also be able to view your health information using other applications (apps) compatible with our system.

## 2022-09-30 NOTE — DISCHARGE NOTE PROVIDER - HOSPITAL COURSE
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 In the ED, pt afebrile, HDS, nontoxic appearing S/p multiple episodes of blood per rectum and repeat hb of 6.5 from 8.8 and positive UA. She was transferred to the SICU for hemodynamic monitoring in the setting of presumed lower GI bleeding. During her hospital stay, she received 4 units of pRBC, colonoscopy with no clot or evidence of active bleeding. Patient was stepped down to telemetry and continued to do well with stable Hgb and nonbloody BMs. Patient was then stepped down to regional floors, and is tolerating a regular diet. On day of discharge patient was stable to be d/c'd home.

## 2022-09-30 NOTE — PROGRESS NOTE ADULT - NUTRITIONAL ASSESSMENT
This patient has been assessed with a concern for Malnutrition and has been determined to have a diagnosis/diagnoses of Severe protein-calorie malnutrition.    This patient is being managed with:   Diet Regular-  Fiber/Residue Restricted (LOWFIBER)  DASH/TLC {Sodium & Cholesterol Restricted} (DASH)  Entered: Sep 27 2022  7:44AM    

## 2022-09-30 NOTE — DISCHARGE NOTE PROVIDER - DETAILS OF MALNUTRITION DIAGNOSIS/DIAGNOSES
This patient has been assessed with a concern for Malnutrition and was treated during this hospitalization for the following Nutrition diagnosis/diagnoses:     -  09/26/2022: Severe protein-calorie malnutrition

## 2022-10-10 DIAGNOSIS — E03.9 HYPOTHYROIDISM, UNSPECIFIED: ICD-10-CM

## 2022-10-10 DIAGNOSIS — Z79.82 LONG TERM (CURRENT) USE OF ASPIRIN: ICD-10-CM

## 2022-10-10 DIAGNOSIS — Z88.2 ALLERGY STATUS TO SULFONAMIDES: ICD-10-CM

## 2022-10-10 DIAGNOSIS — J44.9 CHRONIC OBSTRUCTIVE PULMONARY DISEASE, UNSPECIFIED: ICD-10-CM

## 2022-10-10 DIAGNOSIS — M35.3 POLYMYALGIA RHEUMATICA: ICD-10-CM

## 2022-10-10 DIAGNOSIS — K92.1 MELENA: ICD-10-CM

## 2022-10-10 DIAGNOSIS — I25.10 ATHEROSCLEROTIC HEART DISEASE OF NATIVE CORONARY ARTERY WITHOUT ANGINA PECTORIS: ICD-10-CM

## 2022-10-10 DIAGNOSIS — C49.A2 GASTROINTESTINAL STROMAL TUMOR OF STOMACH: ICD-10-CM

## 2022-10-10 DIAGNOSIS — I35.0 NONRHEUMATIC AORTIC (VALVE) STENOSIS: ICD-10-CM

## 2022-10-10 DIAGNOSIS — Z95.5 PRESENCE OF CORONARY ANGIOPLASTY IMPLANT AND GRAFT: ICD-10-CM

## 2022-10-10 DIAGNOSIS — I11.0 HYPERTENSIVE HEART DISEASE WITH HEART FAILURE: ICD-10-CM

## 2022-10-10 DIAGNOSIS — Z88.9 ALLERGY STATUS TO UNSPECIFIED DRUGS, MEDICAMENTS AND BIOLOGICAL SUBSTANCES: ICD-10-CM

## 2022-10-10 DIAGNOSIS — D62 ACUTE POSTHEMORRHAGIC ANEMIA: ICD-10-CM

## 2022-10-10 DIAGNOSIS — Z90.722 ACQUIRED ABSENCE OF OVARIES, BILATERAL: ICD-10-CM

## 2022-10-10 DIAGNOSIS — N39.0 URINARY TRACT INFECTION, SITE NOT SPECIFIED: ICD-10-CM

## 2022-10-10 DIAGNOSIS — E78.5 HYPERLIPIDEMIA, UNSPECIFIED: ICD-10-CM

## 2022-10-10 DIAGNOSIS — I50.32 CHRONIC DIASTOLIC (CONGESTIVE) HEART FAILURE: ICD-10-CM

## 2022-10-10 DIAGNOSIS — Z90.710 ACQUIRED ABSENCE OF BOTH CERVIX AND UTERUS: ICD-10-CM

## 2022-10-10 DIAGNOSIS — K57.91 DIVERTICULOSIS OF INTESTINE, PART UNSPECIFIED, WITHOUT PERFORATION OR ABSCESS WITH BLEEDING: ICD-10-CM

## 2022-10-10 DIAGNOSIS — E43 UNSPECIFIED SEVERE PROTEIN-CALORIE MALNUTRITION: ICD-10-CM

## 2022-10-12 ENCOUNTER — APPOINTMENT (OUTPATIENT)
Dept: COLORECTAL SURGERY | Facility: CLINIC | Age: 87
End: 2022-10-12

## 2022-10-12 VITALS
RESPIRATION RATE: 17 BRPM | HEART RATE: 75 BPM | SYSTOLIC BLOOD PRESSURE: 130 MMHG | OXYGEN SATURATION: 98 % | TEMPERATURE: 96.2 F | DIASTOLIC BLOOD PRESSURE: 75 MMHG

## 2022-10-12 DIAGNOSIS — Z87.19 PERSONAL HISTORY OF OTHER DISEASES OF THE DIGESTIVE SYSTEM: ICD-10-CM

## 2022-10-12 PROCEDURE — 99214 OFFICE O/P EST MOD 30 MIN: CPT

## 2022-10-12 RX ORDER — CLOPIDOGREL 75 MG/1
TABLET, FILM COATED ORAL
Refills: 0 | Status: ACTIVE | COMMUNITY

## 2022-10-12 NOTE — HISTORY OF PRESENT ILLNESS
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 During her admission she received 4u pRBC, was prepped and underwent a colonoscopy that only revealed old clotted blood in the rectosigmoid, diverticulosis, no gross mass, and not evidence of active bleeding. Her bleeding was most likely 2/2 diverticulosis in the setting of DAPT, as such, her diet was resumed and she was monitored on Plavix alone for 48hrs inpatient prior to discharge. She now presents to the [facility] clinic for follow up. Since her hospitalization she reports generalized weakness however denies any recurrent GI bleeding, SOB, chest pain or pressure. She has been ambulatory at home with a rollator without lightheadedness or dizziness. She had some mild constipation after her hospitalization that has resolved with OTC stool softeners and has now been going daily without straining.

## 2022-10-12 NOTE — ASSESSMENT
[FreeTextEntry1] : 89F with CAD previously on DAPT recently admitted for diverticular bleeding that resolved nonoperatively, now on single antiplatelet agent (plavix) without any recurrent bleeding or cardiac event.

## 2022-10-12 NOTE — PHYSICAL EXAM
[Normal Breath Sounds] : Normal breath sounds [Normal Rate and Rhythm] : normal rate and rhythm [No Rash or Lesion] : No rash or lesion [Alert] : alert [Calm] : calm [Abdomen Masses] : No abdominal masses [Abdomen Tenderness] : ~T No ~M abdominal tenderness [JVD] : no jugular venous distention  [de-identified] : Well appearing elderly female in NAD [de-identified] : MMM [de-identified] : ambulating with rollator [FreeTextEntry1] : The pt was examined in the left lateral decubitus position with a medical assistant present for the entirety of the exam. Visual examination of the anal verge with effacement of the buttocks revealed no masses, ulcerations, fissures or skin rashes. Digital rectal exam revealed no palpable mass or blood with sphincter tone within normal limits. A lubricated anoscope was then inserted revealing healthy appearing anoderm with three appropriately sized, noninflamed internal hemorrhoids.  The patient tolerated the exam well.

## 2022-12-15 ENCOUNTER — APPOINTMENT (OUTPATIENT)
Dept: ULTRASOUND IMAGING | Facility: CLINIC | Age: 87
End: 2022-12-15

## 2022-12-15 ENCOUNTER — OUTPATIENT (OUTPATIENT)
Dept: OUTPATIENT SERVICES | Facility: HOSPITAL | Age: 87
LOS: 1 days | End: 2022-12-15

## 2022-12-15 DIAGNOSIS — Z98.890 OTHER SPECIFIED POSTPROCEDURAL STATES: Chronic | ICD-10-CM

## 2022-12-15 PROCEDURE — 76881 US COMPL JOINT R-T W/IMG: CPT | Mod: 26,LT

## 2023-01-01 NOTE — PROCEDURAL SAFETY CHECKLIST WITH OR WITHOUT SEDATION - NSPX2BRECORDED_GEN_ALL_CORE
Message left on mom's voicemail notifying her of the rx at pharmacy and to follow up with GI.   Colonoscopy

## 2023-01-31 NOTE — ED ADULT TRIAGE NOTE - TEMPERATURE IN FAHRENHEIT (DEGREES F)
Impression: Other secondary cataract, right eye: H26.491. Plan: Opacified capsule with option for YAG laser capsulotomy. Discussed procedure, risks, benefits and side effects. Patient requests YAG laser capsulotomy. 97.6

## 2023-05-04 NOTE — ED ADULT NURSE NOTE - NSFALLRSKPASTHIST_ED_ALL_ED
Detail Level: Detailed X Size Of Lesion In Cm (Optional): 0 Anatomic Location From Referring Provider: nasal tip Name Of The Referring Provider For Procedure: Mirtha Irwin Incorporate Mauc In Note: Yes no

## 2023-06-02 NOTE — DISCHARGE NOTE NURSING/CASE MANAGEMENT/SOCIAL WORK - NSTRANSFERBELONGINGSDISPO_GEN_A_NUR
27-year-old female new to the clinic here for vomiting, dark stools  **No records at [facility] with patient

## 2023-06-05 NOTE — PHYSICAL THERAPY INITIAL EVALUATION ADULT - PHYSICAL ASSIST/NONPHYSICAL ASSIST: STAND/SIT, REHAB EVAL
decreased eccentric control upon descent/verbal cues L UE: against gravity: shoulder flexion 0-45* in gravity-eliminated: elbow flexion 0-125, flexion -90*, absent distal to elbow./deficits as listed below

## 2023-11-21 ENCOUNTER — OUTPATIENT (OUTPATIENT)
Dept: OUTPATIENT SERVICES | Facility: HOSPITAL | Age: 88
LOS: 1 days | End: 2023-11-21

## 2023-11-21 ENCOUNTER — APPOINTMENT (OUTPATIENT)
Dept: MRI IMAGING | Facility: CLINIC | Age: 88
End: 2023-11-21
Payer: MEDICARE

## 2023-11-21 DIAGNOSIS — Z98.890 OTHER SPECIFIED POSTPROCEDURAL STATES: Chronic | ICD-10-CM

## 2023-11-21 PROCEDURE — 70551 MRI BRAIN STEM W/O DYE: CPT | Mod: 26

## 2023-11-28 NOTE — ED ADULT NURSE NOTE - NS ED NURSE LEVEL OF CONSCIOUSNESS AFFECT
Problem: Discharge Planning  Goal: Discharge to home or other facility with appropriate resources  11/28/2023 1021 by Selena Owusu RN  Outcome: Adequate for Discharge  11/27/2023 2153 by Haven Ochoa RN  Outcome: Progressing Calm

## 2024-01-08 ENCOUNTER — OUTPATIENT (OUTPATIENT)
Dept: OUTPATIENT SERVICES | Facility: HOSPITAL | Age: 89
LOS: 1 days | End: 2024-01-08
Payer: MEDICARE

## 2024-01-08 ENCOUNTER — APPOINTMENT (OUTPATIENT)
Dept: RADIOLOGY | Facility: CLINIC | Age: 89
End: 2024-01-08

## 2024-01-08 DIAGNOSIS — Z98.890 OTHER SPECIFIED POSTPROCEDURAL STATES: Chronic | ICD-10-CM

## 2024-01-08 PROCEDURE — 73562 X-RAY EXAM OF KNEE 3: CPT | Mod: 26,50

## 2024-01-08 PROCEDURE — 72084 X-RAY EXAM ENTIRE SPI 6/> VW: CPT | Mod: 26

## 2024-01-22 ENCOUNTER — APPOINTMENT (OUTPATIENT)
Dept: ULTRASOUND IMAGING | Facility: CLINIC | Age: 89
End: 2024-01-22

## 2024-01-31 ENCOUNTER — OUTPATIENT (OUTPATIENT)
Dept: OUTPATIENT SERVICES | Facility: HOSPITAL | Age: 89
LOS: 1 days | End: 2024-01-31

## 2024-01-31 ENCOUNTER — APPOINTMENT (OUTPATIENT)
Dept: ULTRASOUND IMAGING | Facility: CLINIC | Age: 89
End: 2024-01-31
Payer: MEDICARE

## 2024-01-31 DIAGNOSIS — Z98.890 OTHER SPECIFIED POSTPROCEDURAL STATES: Chronic | ICD-10-CM

## 2024-01-31 PROCEDURE — 76856 US EXAM PELVIC COMPLETE: CPT | Mod: 26

## 2024-01-31 PROCEDURE — 76700 US EXAM ABDOM COMPLETE: CPT | Mod: 26

## 2024-03-04 ENCOUNTER — APPOINTMENT (OUTPATIENT)
Dept: RADIOLOGY | Facility: CLINIC | Age: 89
End: 2024-03-04

## 2024-03-08 ENCOUNTER — OUTPATIENT (OUTPATIENT)
Dept: OUTPATIENT SERVICES | Facility: HOSPITAL | Age: 89
LOS: 1 days | End: 2024-03-08

## 2024-03-08 ENCOUNTER — APPOINTMENT (OUTPATIENT)
Dept: RADIOLOGY | Facility: CLINIC | Age: 89
End: 2024-03-08
Payer: MEDICARE

## 2024-03-08 DIAGNOSIS — Z98.890 OTHER SPECIFIED POSTPROCEDURAL STATES: Chronic | ICD-10-CM

## 2024-03-08 PROCEDURE — 72040 X-RAY EXAM NECK SPINE 2-3 VW: CPT | Mod: 26

## 2024-03-08 PROCEDURE — 72070 X-RAY EXAM THORAC SPINE 2VWS: CPT | Mod: 26

## 2024-03-08 PROCEDURE — 73560 X-RAY EXAM OF KNEE 1 OR 2: CPT | Mod: 26,LT

## 2024-03-08 PROCEDURE — 72100 X-RAY EXAM L-S SPINE 2/3 VWS: CPT | Mod: 26

## 2024-03-08 PROCEDURE — 73502 X-RAY EXAM HIP UNI 2-3 VIEWS: CPT | Mod: 26,LT

## 2024-05-09 NOTE — ED PROVIDER NOTE - GASTROINTESTINAL [-], MLM
A stent catheter was inserted. Supply used: (STENT SYNERGY XD MNRL 2.5MM 28MM DLV SYS 1 ACC PORT RADOPQ - [id number]). no abdominal pain/no diarrhea

## 2025-01-21 NOTE — PROGRESS NOTE ADULT - PROBLEM/PLAN-8
Received refill request for Flomax  Request is pending review  because refill protocol Met approval criteria  LOV and or labs were verified to be correct  Refill forwarded to provider for approval    [facility] pharmacy requesting short order supply until patient's mail order arrives. Please advise.       
DISPLAY PLAN FREE TEXT
